# Patient Record
Sex: FEMALE | URBAN - METROPOLITAN AREA
[De-identification: names, ages, dates, MRNs, and addresses within clinical notes are randomized per-mention and may not be internally consistent; named-entity substitution may affect disease eponyms.]

---

## 2019-01-30 ENCOUNTER — APPOINTMENT (RX ONLY)
Dept: URBAN - METROPOLITAN AREA CLINIC 148 | Facility: CLINIC | Age: 74
Setting detail: DERMATOLOGY
End: 2019-01-30

## 2019-01-30 DIAGNOSIS — L57.0 ACTINIC KERATOSIS: ICD-10-CM

## 2019-01-30 DIAGNOSIS — R20.2 PARESTHESIA OF SKIN: ICD-10-CM

## 2019-01-30 PROBLEM — M12.9 ARTHROPATHY, UNSPECIFIED: Status: ACTIVE | Noted: 2019-01-30

## 2019-01-30 PROBLEM — E13.9 OTHER SPECIFIED DIABETES MELLITUS WITHOUT COMPLICATIONS: Status: ACTIVE | Noted: 2019-01-30

## 2019-01-30 PROBLEM — H91.90 UNSPECIFIED HEARING LOSS, UNSPECIFIED EAR: Status: ACTIVE | Noted: 2019-01-30

## 2019-01-30 PROBLEM — Z85.828 PERSONAL HISTORY OF OTHER MALIGNANT NEOPLASM OF SKIN: Status: ACTIVE | Noted: 2019-01-30

## 2019-01-30 PROCEDURE — 17003 DESTRUCT PREMALG LES 2-14: CPT

## 2019-01-30 PROCEDURE — ? LIQUID NITROGEN

## 2019-01-30 PROCEDURE — 99213 OFFICE O/P EST LOW 20 MIN: CPT | Mod: 25

## 2019-01-30 PROCEDURE — 17000 DESTRUCT PREMALG LESION: CPT

## 2019-01-30 PROCEDURE — ? COUNSELING

## 2019-01-30 ASSESSMENT — LOCATION DETAILED DESCRIPTION DERM
LOCATION DETAILED: LEFT CLAVICULAR SKIN
LOCATION DETAILED: UPPER STERNUM
LOCATION DETAILED: INFERIOR THORACIC SPINE
LOCATION DETAILED: LEFT CLAVICULAR NECK

## 2019-01-30 ASSESSMENT — LOCATION SIMPLE DESCRIPTION DERM
LOCATION SIMPLE: LEFT CLAVICULAR SKIN
LOCATION SIMPLE: LEFT ANTERIOR NECK
LOCATION SIMPLE: UPPER BACK
LOCATION SIMPLE: CHEST

## 2019-01-30 ASSESSMENT — LOCATION ZONE DERM
LOCATION ZONE: NECK
LOCATION ZONE: TRUNK

## 2019-01-30 NOTE — PROCEDURE: LIQUID NITROGEN
Consent: The patient's consent was obtained including but not limited to risks of crusting, scabbing, blistering, scarring, darker or lighter pigmentary change, recurrence, incomplete removal and infection.
Number Of Freeze-Thaw Cycles: 2 freeze-thaw cycles
Detail Level: Simple
Render Post-Care Instructions In Note?: no
Duration Of Freeze Thaw-Cycle (Seconds): 2
Post-Care Instructions: I reviewed with the patient in detail post-care instructions. Patient is to wear sunprotection, and avoid picking at any of the treated lesions. Pt may apply Vaseline to crusted or scabbing areas.

## 2019-04-22 ENCOUNTER — APPOINTMENT (RX ONLY)
Dept: URBAN - METROPOLITAN AREA CLINIC 151 | Facility: CLINIC | Age: 74
Setting detail: DERMATOLOGY
End: 2019-04-22

## 2019-04-22 DIAGNOSIS — L82.1 OTHER SEBORRHEIC KERATOSIS: ICD-10-CM

## 2019-04-22 DIAGNOSIS — D18.0 HEMANGIOMA: ICD-10-CM

## 2019-04-22 DIAGNOSIS — D17 BENIGN LIPOMATOUS NEOPLASM: ICD-10-CM

## 2019-04-22 DIAGNOSIS — L72.8 OTHER FOLLICULAR CYSTS OF THE SKIN AND SUBCUTANEOUS TISSUE: ICD-10-CM

## 2019-04-22 DIAGNOSIS — L57.0 ACTINIC KERATOSIS: ICD-10-CM

## 2019-04-22 DIAGNOSIS — L81.4 OTHER MELANIN HYPERPIGMENTATION: ICD-10-CM

## 2019-04-22 PROBLEM — D18.01 HEMANGIOMA OF SKIN AND SUBCUTANEOUS TISSUE: Status: ACTIVE | Noted: 2019-04-22

## 2019-04-22 PROBLEM — D17.21 BENIGN LIPOMATOUS NEOPLASM OF SKIN AND SUBCUTANEOUS TISSUE OF RIGHT ARM: Status: ACTIVE | Noted: 2019-04-22

## 2019-04-22 PROCEDURE — ? LIQUID NITROGEN

## 2019-04-22 PROCEDURE — 17003 DESTRUCT PREMALG LES 2-14: CPT

## 2019-04-22 PROCEDURE — ? COUNSELING

## 2019-04-22 PROCEDURE — 99213 OFFICE O/P EST LOW 20 MIN: CPT | Mod: 25

## 2019-04-22 PROCEDURE — 17000 DESTRUCT PREMALG LESION: CPT

## 2019-04-22 ASSESSMENT — LOCATION SIMPLE DESCRIPTION DERM
LOCATION SIMPLE: UPPER BACK
LOCATION SIMPLE: SCALP
LOCATION SIMPLE: RIGHT FOREHEAD
LOCATION SIMPLE: LEFT FOREARM
LOCATION SIMPLE: LEFT TEMPLE
LOCATION SIMPLE: RIGHT PRETIBIAL REGION
LOCATION SIMPLE: LEFT UPPER LIP
LOCATION SIMPLE: LOWER BACK
LOCATION SIMPLE: CHEST
LOCATION SIMPLE: RIGHT FOREARM
LOCATION SIMPLE: RIGHT UPPER BACK
LOCATION SIMPLE: ABDOMEN

## 2019-04-22 ASSESSMENT — LOCATION DETAILED DESCRIPTION DERM
LOCATION DETAILED: LEFT CENTRAL PARIETAL SCALP
LOCATION DETAILED: EPIGASTRIC SKIN
LOCATION DETAILED: RIGHT VENTRAL PROXIMAL FOREARM
LOCATION DETAILED: RIGHT PROXIMAL DORSAL FOREARM
LOCATION DETAILED: RIGHT INFERIOR FOREHEAD
LOCATION DETAILED: LEFT PROXIMAL DORSAL FOREARM
LOCATION DETAILED: LEFT LATERAL TEMPLE
LOCATION DETAILED: LEFT SUPERIOR VERMILION BORDER
LOCATION DETAILED: SUPERIOR LUMBAR SPINE
LOCATION DETAILED: UPPER STERNUM
LOCATION DETAILED: RIGHT PROXIMAL PRETIBIAL REGION
LOCATION DETAILED: INFERIOR THORACIC SPINE
LOCATION DETAILED: RIGHT SUPERIOR UPPER BACK

## 2019-04-22 ASSESSMENT — LOCATION ZONE DERM
LOCATION ZONE: LIP
LOCATION ZONE: FACE
LOCATION ZONE: LEG
LOCATION ZONE: ARM
LOCATION ZONE: SCALP
LOCATION ZONE: TRUNK

## 2019-08-12 PROBLEM — F33.41 RECURRENT MAJOR DEPRESSION IN PARTIAL REMISSION (HCC): Status: ACTIVE | Noted: 2018-02-01

## 2019-08-12 PROBLEM — M51.27 HERNIATED NUCLEUS PULPOSUS, L5-S1: Status: ACTIVE | Noted: 2017-12-20

## 2019-08-12 PROBLEM — Z79.01 LONG-TERM (CURRENT) USE OF ANTICOAGULANTS: Status: ACTIVE | Noted: 2017-01-16

## 2019-08-12 PROBLEM — I10 HYPERTENSION, ESSENTIAL: Status: ACTIVE | Noted: 2017-01-24

## 2019-08-12 PROBLEM — E11.8 CONTROLLED TYPE 2 DIABETES MELLITUS WITH COMPLICATION, WITHOUT LONG-TERM CURRENT USE OF INSULIN: Status: ACTIVE | Noted: 2017-03-23

## 2019-08-12 PROBLEM — E78.2 MIXED HYPERLIPIDEMIA: Status: ACTIVE | Noted: 2017-01-16

## 2019-08-12 PROBLEM — E55.9 VITAMIN D DEFICIENCY: Status: ACTIVE | Noted: 2017-03-23

## 2019-08-12 PROBLEM — M85.80 OSTEOPENIA: Status: ACTIVE | Noted: 2017-03-23

## 2019-08-12 RX ORDER — ZOLPIDEM TARTRATE 5 MG/1
5 TABLET ORAL NIGHTLY PRN
COMMUNITY
Start: 2018-06-13 | End: 2020-02-25

## 2019-08-12 RX ORDER — CITALOPRAM 10 MG/1
10 TABLET ORAL DAILY
Qty: 90 TABLET | Refills: 3
Start: 2019-08-12 | End: 2019-08-22

## 2019-08-12 RX ORDER — ACETAMINOPHEN 500 MG
TABLET ORAL
COMMUNITY
End: 2019-09-10 | Stop reason: SDUPTHER

## 2019-08-12 RX ORDER — VITAMIN E (DL,TOCOPHERYL ACET) 180 MG
CAPSULE ORAL
COMMUNITY
End: 2019-11-06

## 2019-08-12 RX ORDER — WARFARIN SODIUM 2.5 MG/1
2.5 TABLET ORAL 3 TIMES WEEKLY
COMMUNITY
Start: 2018-06-05 | End: 2020-07-09 | Stop reason: SDUPTHER

## 2019-08-22 ENCOUNTER — OFFICE VISIT (OUTPATIENT)
Dept: FAMILY MEDICINE CLINIC | Facility: CLINIC | Age: 74
End: 2019-08-22

## 2019-08-22 VITALS
TEMPERATURE: 98.1 F | DIASTOLIC BLOOD PRESSURE: 76 MMHG | SYSTOLIC BLOOD PRESSURE: 136 MMHG | HEART RATE: 56 BPM | BODY MASS INDEX: 29.26 KG/M2 | WEIGHT: 159 LBS | HEIGHT: 62 IN | OXYGEN SATURATION: 99 %

## 2019-08-22 DIAGNOSIS — Z79.01 LONG TERM CURRENT USE OF ANTICOAGULANT THERAPY: ICD-10-CM

## 2019-08-22 DIAGNOSIS — M25.512 ACUTE PAIN OF LEFT SHOULDER: Primary | ICD-10-CM

## 2019-08-22 DIAGNOSIS — M54.50 CHRONIC LOW BACK PAIN WITHOUT SCIATICA, UNSPECIFIED BACK PAIN LATERALITY: ICD-10-CM

## 2019-08-22 DIAGNOSIS — E11.8 CONTROLLED TYPE 2 DIABETES MELLITUS WITH COMPLICATION, WITHOUT LONG-TERM CURRENT USE OF INSULIN (HCC): ICD-10-CM

## 2019-08-22 DIAGNOSIS — G60.9 IDIOPATHIC PERIPHERAL NEUROPATHY: ICD-10-CM

## 2019-08-22 DIAGNOSIS — I48.0 PAROXYSMAL ATRIAL FIBRILLATION (HCC): ICD-10-CM

## 2019-08-22 DIAGNOSIS — I10 HYPERTENSION, ESSENTIAL: ICD-10-CM

## 2019-08-22 DIAGNOSIS — G89.29 CHRONIC LOW BACK PAIN WITHOUT SCIATICA, UNSPECIFIED BACK PAIN LATERALITY: ICD-10-CM

## 2019-08-22 PROCEDURE — 73030 X-RAY EXAM OF SHOULDER: CPT | Performed by: FAMILY MEDICINE

## 2019-08-22 PROCEDURE — 99204 OFFICE O/P NEW MOD 45 MIN: CPT | Performed by: FAMILY MEDICINE

## 2019-08-22 RX ORDER — PREGABALIN 50 MG/1
50 CAPSULE ORAL 3 TIMES DAILY
Qty: 90 CAPSULE | Refills: 2 | Status: SHIPPED | OUTPATIENT
Start: 2019-08-22 | End: 2019-09-12 | Stop reason: SDUPTHER

## 2019-08-22 RX ORDER — CITALOPRAM 20 MG/1
20 TABLET ORAL NIGHTLY
COMMUNITY
Start: 2019-07-08 | End: 2020-02-07

## 2019-08-22 RX ORDER — ACETAMINOPHEN 500 MG
500 TABLET ORAL EVERY 4 HOURS PRN
COMMUNITY
End: 2019-11-12 | Stop reason: HOSPADM

## 2019-08-22 NOTE — PROGRESS NOTES
Samira Workman is a 74 y.o. female.     Chief Complaint   Patient presents with   • Shoulder Pain     Patient is wanting to establish primary care. She has pain in her left shoulder    • Atrial Fibrillation     Patient is needing a referral to a new cardiologist patient said she recently had her pt/inr checked last Thursday and it was 2.0       HPI     Pt is a pleasant 74 y.o. YO female here for atrial fibrillation ( on warfarin treatment) and left shoulder pain.  PMH includes atrial fibrillation, HTN, HLD, DM2, anxiety, insomnia, chronic lower back pain.    Atrial Fibrillation: warfarin anticoagulation, MWF 2.5 mg, T, Th, Sat, Sun 5 mg - a fib occurred as a single episode - Aug 2016 - has never been on a rate or rhythm control. Does occasionally have a burning pain that occurs behind her shoulder blades or indigestion type pain and she wonders if this is an episode of a-fib that causes these symptoms.     HTN: chronic, well controlled, benazepril 10 mg once daily    DM2: chronic, well controlled - metformin 500 mg once daily, last A1C was done in July or August, patient has result at home    Anxiety: chronic, stable, doing very well on citalopram 20 mg    Insomnia: secondary to neuropathy/ pain rather than true insomnia, takes zolpidem 5 mg at bedtime which helps her go to sleep.     Lower Back Pain: takes tramadol twice daily with two tylenol and helps significantly, tramadol helps some with neuropathy as well. Does not like taking it and is careful to only use it when her pain is more severe.     Peripheral Neuropathy: bilateral feet and toes, constant, sometimes burning/pins and needles, primarily feels incredible pressure in foot - toes constantly moving. Diagnosed by neurology who said it was a result of lower back disease, had an EMG. Has tried gabapentin but had side effect of unbearable dry mouth. Thinks she may have tried duloxetine or lyrica in the past but unsure.      Left Shoulder Pain: Started about  5-6 weeks ago - after she fell getting out of bed, has continued to have pain in the shoulder since then. Pain is constant, left shoulder tender to palpation, has pain with internal and external rotation.     The following portions of the patient's history were reviewed and updated as appropriate: allergies, current medications, past family history, past medical history, past social history, past surgical history and problem list.    Health Maintenance   DEXA scan 10/18  Colonoscopy 4 years ago normal, GI told her it was last one indicated  Mammogram 10/18    Review of Systems   Constitutional: Negative for fatigue and unexpected weight change.   HENT: Positive for postnasal drip. Negative for congestion and sinus pain.    Eyes: Negative for pain and redness.   Respiratory: Negative for cough and wheezing.    Cardiovascular: Negative for chest pain and palpitations.   Gastrointestinal: Negative for nausea and vomiting.   Endocrine: Negative for cold intolerance and heat intolerance.   Genitourinary: Negative for dysuria and frequency.   Musculoskeletal: Positive for arthralgias and back pain. Negative for neck pain.        Patient is having left shoulder pain    Skin: Negative for rash and wound.   Allergic/Immunologic: Negative for environmental allergies and food allergies.   Neurological: Negative for numbness and headaches.   Hematological: Negative for adenopathy. Does not bruise/bleed easily.   Psychiatric/Behavioral: Negative for behavioral problems. The patient is not hyperactive.        Objective  Vitals:    08/22/19 0946   BP: 136/76   Pulse: 56   Temp: 98.1 °F (36.7 °C)   SpO2: 99%        Physical Exam   Constitutional: She is oriented to person, place, and time. She appears well-developed and well-nourished. No distress.   HENT:   Head: Normocephalic and atraumatic.   Nose: Nose normal.   Eyes: Conjunctivae are normal. Right eye exhibits no discharge. Left eye exhibits no discharge. No scleral icterus.    Pulmonary/Chest: Effort normal. No respiratory distress.   Musculoskeletal:   Tenderness to palpation over shoulder diffusely, negative empty can test, pain with internal and external rotation but good range of motion   Neurological: She is alert and oriented to person, place, and time.   Skin: Skin is warm and dry.   Psychiatric: She has a normal mood and affect. Her behavior is normal. Judgment and thought content normal.         Current Outpatient Medications:   •  acetaminophen (TYLENOL) 500 MG tablet, Take  by mouth., Disp: , Rfl:   •  acetaminophen (TYLENOL) 500 MG tablet, Take  by mouth., Disp: , Rfl:   •  B COMPLEX VITAMINS PO, Take 1 tablet by mouth Daily., Disp: , Rfl:   •  benazepril (LOTENSIN) 10 MG tablet, Take 10 mg by mouth Daily., Disp: , Rfl:   •  Calcium Carb-Cholecalciferol (CALCIUM 1000 + D PO), calcium  1000 daily, Disp: , Rfl:   •  Cholecalciferol (VITAMIN D3) 1000 UNIT/SPRAY liquid, Vitamin D3  1 daily, Disp: , Rfl:   •  citalopram (CeleXA) 20 MG tablet, , Disp: , Rfl:   •  cyanocobalamin 100 MCG tablet, B12  1 daily, Disp: , Rfl:   •  metFORMIN (GLUCOPHAGE) 500 MG tablet, Take 500 mg by mouth 4 (Four) Times a Day., Disp: , Rfl:   •  Multiple Vitamins-Minerals (HAIR SKIN NAILS) capsule, Take  by mouth., Disp: , Rfl:   •  pravastatin (PRAVACHOL) 40 MG tablet, Take 40 mg by mouth Daily., Disp: , Rfl:   •  traMADol (ULTRAM) 50 MG tablet, Take 50 mg by mouth Every 6 (Six) Hours As Needed for Moderate Pain ., Disp: , Rfl:   •  TURMERIC PO, Take 1 tablet by mouth Daily., Disp: , Rfl:   •  warfarin (COUMADIN) 2.5 MG tablet, Take 2.5 mg by mouth 3 (Three) Times a Week. 3 times a week, Disp: , Rfl:   •  warfarin (COUMADIN) 5 MG tablet, Take 5 mg by mouth 4 (Four) Times a Week. Take 4 days a week, Disp: , Rfl:   •  zolpidem (AMBIEN) 5 MG tablet, Take 5 mg by mouth., Disp: , Rfl:   •  pregabalin (LYRICA) 50 MG capsule, Take 1 capsule by mouth 3 (Three) Times a Day., Disp: 90 capsule, Rfl:  2    Procedures    Lab Results (most recent)     None              Samira was seen today for shoulder pain and atrial fibrillation.    Diagnoses and all orders for this visit:    Acute pain of left shoulder  -     XR Shoulder 2+ View Left    Idiopathic peripheral neuropathy  -     pregabalin (LYRICA) 50 MG capsule; Take 1 capsule by mouth 3 (Three) Times a Day.    Hypertension, essential    Paroxysmal atrial fibrillation (CMS/HCC)  -     Ambulatory Referral to Cardiology    Controlled type 2 diabetes mellitus with complication, without long-term current use of insulin (CMS/HCC)    Chronic low back pain without sciatica, unspecified back pain laterality    Long term current use of anticoagulant therapy    Other orders  -     Discontinue: citalopram (CELEXA) 10 MG tablet; Take 1 tablet by mouth Daily.    Atrial Fibrillation: continue current warfarin therapy, return in ~ 3 weeks for INR recheck, referral to cardiology regarding risk/benefit of continuing anticoagulation with single episode of a-fib    HTN: continue benazepril at 10 mg    DM2: chronic, well controlled - continue daily metformin, patient will bring result of recent A1C to next appointment in a couple of weeks    Anxiety: doing very well on citalopram 20 mg - continue    Insomnia: zolpidem 5 mg. Hopeful that if we can find something that helps with her symptoms of neuropathy may be able to eventually wean off of and discontinue this.     Lower Back Pain: tramadol 50 mg BID prn, The patient read and signed the [facility] Controlled Substance Contract.  I will continue to see patient for regular follow up appointments.  They are well controlled on their medication.  YNES has been reviewed by me and will be updated every 3 months. The patient is aware of the potential for addiction and dependence.    Peripheral Neuropathy: chronic, uncontrolled. Failed treatment with gabapentin due to negative side effects. Will try pregabalin starting at 50 mg TID,  follow up in a couple weeks.     Left Shoulder Pain: Started about 5-6 weeks ago - after she fell getting out of bed, has continued to have pain in the shoulder since then. Pain is constant, left shoulder tender to palpation, has pain with internal and external rotation. X-ray performed in office, reviewed by me, concerning for fracture of scapula - forwarded to radiology who reviewed film with read as degenerative joint disease. With her continued pain may have a flair of arthritis or transitioning to adhesive capsulitis. Will recommend physical therapy for patient and application of heating pad, will follow up in a couple weeks, if still painful may perform joint injection.       Return in about 3 weeks (around 9/12/2019) for Recheck shoulder and INR check.      Anne Leiva MD

## 2019-09-10 ENCOUNTER — OFFICE VISIT (OUTPATIENT)
Dept: CARDIOLOGY | Facility: CLINIC | Age: 74
End: 2019-09-10

## 2019-09-10 VITALS
SYSTOLIC BLOOD PRESSURE: 136 MMHG | HEART RATE: 59 BPM | BODY MASS INDEX: 29.83 KG/M2 | WEIGHT: 158 LBS | HEIGHT: 61 IN | DIASTOLIC BLOOD PRESSURE: 80 MMHG

## 2019-09-10 DIAGNOSIS — R91.1 PULMONARY NODULE: ICD-10-CM

## 2019-09-10 DIAGNOSIS — I48.0 PAROXYSMAL ATRIAL FIBRILLATION (HCC): ICD-10-CM

## 2019-09-10 DIAGNOSIS — M89.8X1 SHOULDER BLADE PAIN: Primary | ICD-10-CM

## 2019-09-10 PROCEDURE — 99204 OFFICE O/P NEW MOD 45 MIN: CPT | Performed by: INTERNAL MEDICINE

## 2019-09-10 PROCEDURE — 93000 ELECTROCARDIOGRAM COMPLETE: CPT | Performed by: INTERNAL MEDICINE

## 2019-09-10 NOTE — PROGRESS NOTES
Subjective:     Encounter Date:09/10/2019      Patient ID: Samira Workman is a 74 y.o. female.    Chief Complaint:  This is a 74-year-old woman with a history of single episode of atrial fibrillation.  She has recently moved from [place] is here to establish care.  In 2016 she was living in [place].  She was under a lot of stress as her elderly mother was living with her at that time.  She had severe chest pain and a syncopal episode.  She presented to the hospital there and underwent thorough cardiac evaluation.  She was found to be in atrial fibrillation.  She apparently converted back to normal rhythm and did not require at least electrical cardioversion.  She underwent cardiac catheterization via the right radial artery which showed normal coronary arteries and LV filling pressures.  She had a CTA of the chest which showed a 5 mm Noncalcified nodule in the right lower lobe which should have been followed up with serial CT scans, but was not.  She had a AAA screen which was normal.  She had carotid Dopplers showing less than 50% bilateral stenosis.  She had an echocardiogram which was technically limited but showed and normal LV systolic function with mitral annular calcification.  The patient was treated with full dose anticoagulation with warfarin.      She has not had another episode of atrial fibrillation.  She has no chest pain or dyspnea.  No further syncope or palpitations.  She recently moved from [place] last month to be closer to the rest of her family.  She does not participate in regular exercise currently.She says her INR is usually around 2-2.5.  She is on bothered by frequent INR checks.    She feels quite well outside of arthritis in her left shoulder.  She also has persistent shoulder blade pain on the right side.  She also complains of lower extremity peripheral neuropathy.    She has never smoked.  She drinks alcohol socially.  She is retired and  2 children.  Her father had heart  disease.        The following portions of the patient's history were reviewed and updated as appropriate: allergies, current medications, past family history, past medical history, past social history, past surgical history and problem list.         REVIEW OF SYSTEMS:   All systems reviewed.  Pertinent positives identified in HPI.  All other systems are negative.      Past Medical History:   Diagnosis Date   • Anxiety    • Arthritis    • Depression    • Diabetes mellitus (CMS/HCC)    • Hyperlipidemia    • Hypertension    • Neuropathy    • Paroxysmal atrial fibrillation (CMS/HCC) 8/6/2016       Family History   Problem Relation Age of Onset   • Thyroid disease Mother    • Anxiety disorder Mother    • Depression Mother    • Bipolar disorder Mother    • Heart failure Father    • Hypertension Father    • Kidney nephrosis Brother        Social History     Socioeconomic History   • Marital status:      Spouse name: Not on file   • Number of children: 2   • Years of education: Not on file   • Highest education level: Not on file   Tobacco Use   • Smoking status: Never Smoker   • Smokeless tobacco: Never Used   Substance and Sexual Activity   • Alcohol use: Yes     Frequency: Monthly or less     Comment: occasionally socially   • Drug use: Defer   • Sexual activity: Defer       Allergies   Allergen Reactions   • Ciprofibrate Nausea And Vomiting   • Ciprofloxacin Nausea And Vomiting   • Gabapentin Other (See Comments)     Severe dry mouth.   • Sulfa Antibiotics Itching       Past Surgical History:   Procedure Laterality Date   • BACK SURGERY     • BLADDER SUSPENSION     • CHOLECYSTECTOMY     • HYSTERECTOMY  1970   • KNEE SURGERY Right    • SHOULDER SURGERY Right          ECG 12 Lead  Date/Time: 9/10/2019 11:45 AM  Performed by: Linda Shin MD  Authorized by: Linda Shin MD   Comparison: compared with previous ECG from 5/8/2019  Similar to previous ECG  Rhythm: sinus rhythm  Rate: normal  Conduction: conduction  normal  ST Segments: ST segments normal  T Waves: T waves normal  QRS axis: normal  Other: no other findings    Clinical impression: normal ECG               Objective:         GEN: VSS, no distress,   Eyes: normal sclera, normal lids and lashes  HENT: moist mucus membranes,   Respiratory: CTAB, no rales or wheezes  CV: RRR, no murmurs, , +2 DP and 2+ carotid pulses b/l  GI: NABS, soft,  Nontender, nondistended  MSK: no edema, no scoliosis or kyphosis  Skin: no rash, warm, dry  Heme/Lymph: no bruising or bleeding  Psych: organized thought, normal behavior and affect  Neuro: Cranial nerves grossly intact, Alert and Oriented x 3.       Outpatient Encounter Medications as of 9/10/2019   Medication Sig Dispense Refill   • acetaminophen (TYLENOL) 500 MG tablet Take  by mouth.     • B COMPLEX VITAMINS PO Take 1 tablet by mouth Daily.     • benazepril (LOTENSIN) 10 MG tablet Take 10 mg by mouth Daily.     • Calcium Carb-Cholecalciferol (CALCIUM 1000 + D PO) calcium   1000 daily     • Cholecalciferol (VITAMIN D3) 1000 UNIT/SPRAY liquid Vitamin D3   1 daily     • citalopram (CeleXA) 20 MG tablet      • metFORMIN (GLUCOPHAGE) 500 MG tablet Take 500 mg by mouth 4 (Four) Times a Day.     • Multiple Vitamins-Minerals (HAIR SKIN NAILS) capsule Take  by mouth.     • pravastatin (PRAVACHOL) 40 MG tablet Take 40 mg by mouth Daily.     • pregabalin (LYRICA) 50 MG capsule Take 1 capsule by mouth 3 (Three) Times a Day. 90 capsule 2   • traMADol (ULTRAM) 50 MG tablet Take 50 mg by mouth Every 6 (Six) Hours As Needed for Moderate Pain .     • triamcinolone (KENALOG) 0.1 % cream Apply  topically to the appropriate area as directed 2 (Two) Times a Day. 45 g 0   • TURMERIC PO Take 1 tablet by mouth Daily.     • warfarin (COUMADIN) 2.5 MG tablet Take 2.5 mg by mouth 3 (Three) Times a Week. 3 times a week     • warfarin (COUMADIN) 5 MG tablet Take 5 mg by mouth 4 (Four) Times a Week. Take 4 days a week     • zolpidem (AMBIEN) 5 MG tablet Take  5 mg by mouth.     • [DISCONTINUED] acetaminophen (TYLENOL) 500 MG tablet Take  by mouth.     • [DISCONTINUED] cyanocobalamin 100 MCG tablet B12   1 daily       No facility-administered encounter medications on file as of 9/10/2019.              Assessment:          Diagnosis Plan   1. Shoulder blade pain  Ambulatory Referral to Orthopedic Surgery   2. Paroxysmal atrial fibrillation (CMS/HCC)     3. Pulmonary nodule            Plan:       Atrial Fibrillation and Atrial Flutter  Assessment  • The patient has paroxysmal atrial fibrillation  • This is non-valvular in etiology  • The patient's CHADS2-VASc score is 4  • A [id number] score of 2 or more is considered a high risk for a thromboembolic event  • Warfarin prescribed    This is a 74 year old woman with one episode of AF in the past in 2016. It seems that she's had no further AF since then. She has been maintained on Warfarin since then. I asked if she would like to switch to a NOAC for ease of dosing and overall safety, but she prefers to stay with Warfarin.   For her pulmonary nodule, I have ordered a repeat CT chest for follow up.   For her shoulder pain after a fall, I referred her to orthopedics.     I will see her back in 6 months.     Dr. Leiva, thank you very much for referring this patient to me. Please call with questions or concerns.        Linda Shin MD  09/10/19  [facility]

## 2019-09-12 ENCOUNTER — OFFICE VISIT (OUTPATIENT)
Dept: FAMILY MEDICINE CLINIC | Facility: CLINIC | Age: 74
End: 2019-09-12

## 2019-09-12 VITALS
DIASTOLIC BLOOD PRESSURE: 64 MMHG | BODY MASS INDEX: 30.06 KG/M2 | SYSTOLIC BLOOD PRESSURE: 114 MMHG | HEART RATE: 54 BPM | WEIGHT: 159.2 LBS | HEIGHT: 61 IN | OXYGEN SATURATION: 99 % | TEMPERATURE: 98.1 F

## 2019-09-12 DIAGNOSIS — Z79.01 LONG TERM CURRENT USE OF ANTICOAGULANT THERAPY: Primary | ICD-10-CM

## 2019-09-12 DIAGNOSIS — M25.512 ACUTE PAIN OF LEFT SHOULDER: ICD-10-CM

## 2019-09-12 DIAGNOSIS — H53.8 BLURRED VISION: ICD-10-CM

## 2019-09-12 DIAGNOSIS — G60.9 IDIOPATHIC PERIPHERAL NEUROPATHY: ICD-10-CM

## 2019-09-12 LAB
INR PPP: 1.9 (ref 0.9–1.1)
PROTHROMBIN TIME: 22.7 SECONDS

## 2019-09-12 PROCEDURE — 85610 PROTHROMBIN TIME: CPT | Performed by: FAMILY MEDICINE

## 2019-09-12 PROCEDURE — 99214 OFFICE O/P EST MOD 30 MIN: CPT | Performed by: FAMILY MEDICINE

## 2019-09-12 PROCEDURE — 36416 COLLJ CAPILLARY BLOOD SPEC: CPT | Performed by: FAMILY MEDICINE

## 2019-09-12 RX ORDER — PREGABALIN 150 MG/1
150 CAPSULE ORAL 2 TIMES DAILY
Qty: 60 CAPSULE | Refills: 2 | Status: SHIPPED | OUTPATIENT
Start: 2019-09-12 | End: 2019-10-10

## 2019-09-12 NOTE — PROGRESS NOTES
Samira Workman is a 74 y.o. female.     Chief Complaint   Patient presents with   • Shoulder Pain     Patient is needing to f/u on her lyrica medication. She is also wanting a recommendation for an eye doctor        HPI     Pt is a pleasant 74 y.o. YO female here for INR check, peripheral neuropathy, vision loss, and left shoulder pain.  PMH includes diabetes/pre-diabetes, atrial fibrillation, HTN, and osteopenia.    Anticoagulation - on Warfarin - INR check - INR today is 1.9, just below goal of 2-3. She did have more salad this last week. Will not make medication dose adjustments at this time as she has been on stable dose for long period but will recheck in a couple of weeks. She is on anticoagulation for a-fib.    Peripheral Neuropathy: chronic,  uncontrolled, started lyrica ( pregabalin) at last office visit three weeks ago. She feels that she has had some improvement in her symptoms especially during the earlier part of the day but the longer she is on her feet the more severe symptoms become. She has been taking it every 8 hours and has even set an alarm to wake up at 2:00 AM to take it, when she has tried scooting the TID dosing closer she suffers from dry mouth. She is interested in trying an increased dose but only taking the medication twice a day. Other than dry mouth has had no side effects.    Vision Loss: progressive, has a history of cataracts with removal, has not had an eye exam in some time, requests a referral to an ophthalmologist.     Left Shoulder pain: Has been having some improvement but pain re-occurs when she has to lift something heavy or carry something. She does not have pain today. She has a history of right shoulder problems requiring surgery - unsure what exactly the problem was ( possibly rotator cuff tear?) but says her current symptoms on the left feel similar.     The following portions of the patient's history were reviewed and updated as appropriate: allergies, current  medications, past family history, past medical history, past social history, past surgical history and problem list.    Review of Systems   Eyes: Positive for visual disturbance.        Eye dryness   Gastrointestinal: Negative for abdominal pain and constipation.   Musculoskeletal:        Shoulder pain   Neurological:        Neuropathy of the legs       Objective  Vitals:    09/12/19 1049   BP: 114/64   Pulse: 54   Temp: 98.1 °F (36.7 °C)   SpO2: 99%        Physical Exam   Constitutional: She is oriented to person, place, and time. She appears well-developed and well-nourished. No distress.   HENT:   Head: Normocephalic and atraumatic.   Nose: Nose normal.   Eyes: Conjunctivae are normal. Right eye exhibits no discharge. Left eye exhibits no discharge. No scleral icterus.   Neck: No tracheal deviation present.   Pulmonary/Chest: Effort normal. No respiratory distress.   Musculoskeletal:        Left shoulder: She exhibits normal range of motion, no tenderness, no bony tenderness, no deformity and no pain.   Neurological: She is alert and oriented to person, place, and time.   Skin: No erythema. No pallor.   Psychiatric: She has a normal mood and affect. Her behavior is normal. Judgment and thought content normal.         Current Outpatient Medications:   •  acetaminophen (TYLENOL) 500 MG tablet, Take  by mouth., Disp: , Rfl:   •  benazepril (LOTENSIN) 10 MG tablet, Take 10 mg by mouth Daily., Disp: , Rfl:   •  Calcium Carb-Cholecalciferol (CALCIUM 1000 + D PO), calcium  1000 daily, Disp: , Rfl:   •  Cholecalciferol (VITAMIN D3) 1000 UNIT/SPRAY liquid, Vitamin D3  1 daily, Disp: , Rfl:   •  citalopram (CeleXA) 20 MG tablet, , Disp: , Rfl:   •  metFORMIN (GLUCOPHAGE) 500 MG tablet, Take 500 mg by mouth 4 (Four) Times a Day., Disp: , Rfl:   •  Multiple Vitamin (MULTI VITAMIN DAILY PO), Take  by mouth., Disp: , Rfl:   •  Multiple Vitamins-Minerals (HAIR SKIN NAILS) capsule, Take  by mouth., Disp: , Rfl:   •  pravastatin  (PRAVACHOL) 40 MG tablet, Take 40 mg by mouth Daily., Disp: , Rfl:   •  pregabalin (LYRICA) 150 MG capsule, Take 1 capsule by mouth 2 (Two) Times a Day., Disp: 60 capsule, Rfl: 2  •  traMADol (ULTRAM) 50 MG tablet, Take 50 mg by mouth Every 6 (Six) Hours As Needed for Moderate Pain ., Disp: , Rfl:   •  TURMERIC PO, Take 1 tablet by mouth Daily., Disp: , Rfl:   •  warfarin (COUMADIN) 2.5 MG tablet, Take 2.5 mg by mouth 3 (Three) Times a Week. 3 times a week, Disp: , Rfl:   •  warfarin (COUMADIN) 5 MG tablet, Take 5 mg by mouth 4 (Four) Times a Week. Take 4 days a week, Disp: , Rfl:   •  zolpidem (AMBIEN) 5 MG tablet, Take 5 mg by mouth., Disp: , Rfl:     Procedures    Office Visit on 09/12/2019   Component Date Value Ref Range Status   • Protime 09/12/2019 22.7  seconds In process   • INR 09/12/2019 1.9* 0.9 - 1.1 In process             Samira was seen today for shoulder pain.    Diagnoses and all orders for this visit:    Long term current use of anticoagulant therapy  -     POC Protime / INR  - no changes to current warfarin dosing, discussed how salads/leafy greens can affect INR, repeat check in a couple weeks    Idiopathic peripheral neuropathy  Some improvement with lyrica, will try increased dose and change from TID to BID, follow up in a month  -     pregabalin (LYRICA) 150 MG capsule; Take 1 capsule by mouth 2 (Two) Times a Day.    Blurred vision  -     Ambulatory Referral to Ophthalmology     Acute pain of left shoulder  My main concern is for injury or partial tear of one of the rotator cuff muscles, pain is over superior shoulder near insertion of several of these, more pain with abduction and external rotation, pain has persisted almost two months now, will further evaluate with MRI.   -     MRI Shoulder Left Without Contrast; Future      Return in about 1 month (around 10/12/2019) for Recheck diabetes, A1C. and INR      Anne Leiva MD

## 2019-09-17 DIAGNOSIS — M25.512 ACUTE PAIN OF LEFT SHOULDER: ICD-10-CM

## 2019-09-20 ENCOUNTER — TELEPHONE (OUTPATIENT)
Dept: CARDIOLOGY | Facility: CLINIC | Age: 74
End: 2019-09-20

## 2019-09-30 ENCOUNTER — CONSULT (OUTPATIENT)
Dept: ORTHOPEDIC SURGERY | Facility: CLINIC | Age: 74
End: 2019-09-30

## 2019-09-30 VITALS — WEIGHT: 148 LBS | HEIGHT: 61 IN | BODY MASS INDEX: 27.94 KG/M2 | TEMPERATURE: 97.8 F

## 2019-09-30 DIAGNOSIS — M25.512 LEFT SHOULDER PAIN, UNSPECIFIED CHRONICITY: Primary | ICD-10-CM

## 2019-09-30 DIAGNOSIS — M75.100 NONTRAUMATIC TEAR OF ROTATOR CUFF, UNSPECIFIED LATERALITY, UNSPECIFIED TEAR EXTENT: ICD-10-CM

## 2019-09-30 PROCEDURE — 99204 OFFICE O/P NEW MOD 45 MIN: CPT | Performed by: ORTHOPAEDIC SURGERY

## 2019-10-01 RX ORDER — CEFAZOLIN SODIUM 2 G/100ML
2 INJECTION, SOLUTION INTRAVENOUS ONCE
Status: CANCELLED | OUTPATIENT
Start: 2019-11-12 | End: 2019-10-01

## 2019-10-01 NOTE — PROGRESS NOTES
Patient: Samira Workman    YOB: 1945    Medical Record Number: [id number]    Chief Complaints:   Left shoulder pain    History of Present Illness:     74 y.o. female patient who presents with left shoulder pain and weakness.  Her symptoms started after a fall that occurred on July 14.  She was asymptomatic prior to this fall.  Pain is described as moderate to severe, constant and aching.  She gets intermittent stabbing pains with range of motion.  Anti-inflammatories have helped modestly.  She reports weakness when trying to reach or lift.  Denies any other associated complaints or issues.    Allergies:   Allergies   Allergen Reactions   • Ciprofibrate Nausea And Vomiting   • Ciprofloxacin Nausea And Vomiting   • Gabapentin Other (See Comments)     Severe dry mouth.   • Sulfa Antibiotics Itching       Home Medications:    Current Outpatient Medications:   •  acetaminophen (TYLENOL) 500 MG tablet, Take  by mouth., Disp: , Rfl:   •  benazepril (LOTENSIN) 10 MG tablet, Take 10 mg by mouth Daily., Disp: , Rfl:   •  Calcium Carb-Cholecalciferol (CALCIUM 1000 + D PO), calcium  1000 daily, Disp: , Rfl:   •  Cholecalciferol (VITAMIN D3) 1000 UNIT/SPRAY liquid, Vitamin D3  1 daily, Disp: , Rfl:   •  citalopram (CeleXA) 20 MG tablet, , Disp: , Rfl:   •  metFORMIN (GLUCOPHAGE) 500 MG tablet, Take 500 mg by mouth 4 (Four) Times a Day., Disp: , Rfl:   •  Multiple Vitamin (MULTI VITAMIN DAILY PO), Take  by mouth., Disp: , Rfl:   •  Multiple Vitamins-Minerals (HAIR SKIN NAILS) capsule, Take  by mouth., Disp: , Rfl:   •  pravastatin (PRAVACHOL) 40 MG tablet, Take 40 mg by mouth Daily., Disp: , Rfl:   •  pregabalin (LYRICA) 150 MG capsule, Take 1 capsule by mouth 2 (Two) Times a Day., Disp: 60 capsule, Rfl: 2  •  traMADol (ULTRAM) 50 MG tablet, Take 50 mg by mouth Every 6 (Six) Hours As Needed for Moderate Pain ., Disp: , Rfl:   •  TURMERIC PO, Take 1 tablet by mouth Daily., Disp: , Rfl:   •  warfarin (COUMADIN) 2.5 MG  tablet, Take 2.5 mg by mouth 3 (Three) Times a Week. 3 times a week, Disp: , Rfl:   •  warfarin (COUMADIN) 5 MG tablet, Take 5 mg by mouth 4 (Four) Times a Week. Take 4 days a week, Disp: , Rfl:   •  zolpidem (AMBIEN) 5 MG tablet, Take 5 mg by mouth., Disp: , Rfl:     Past Medical History:   Diagnosis Date   • Anxiety    • Arthritis    • Depression    • Diabetes mellitus (CMS/HCC)    • Hyperlipidemia    • Hypertension    • Neuropathy    • Paroxysmal atrial fibrillation (CMS/HCC) 8/6/2016       Past Surgical History:   Procedure Laterality Date   • BACK SURGERY     • BLADDER SUSPENSION     • CHOLECYSTECTOMY     • HYSTERECTOMY  1970   • KNEE SURGERY Right    • SHOULDER SURGERY Right        Social History     Occupational History   • Not on file   Tobacco Use   • Smoking status: Never Smoker   • Smokeless tobacco: Never Used   Substance and Sexual Activity   • Alcohol use: Yes     Frequency: Monthly or less     Comment: occasionally socially   • Drug use: Defer   • Sexual activity: Defer      Social History     Social History Narrative   • Not on file       Family History   Problem Relation Age of Onset   • Thyroid disease Mother    • Anxiety disorder Mother    • Depression Mother    • Bipolar disorder Mother    • Heart failure Father    • Hypertension Father    • Kidney nephrosis Brother        Review of Systems:      Constitutional: Denies fever, shaking or chills   Eyes: Denies change in visual acuity   HEENT: Denies nasal congestion or sore throat   Respiratory: Denies cough or shortness of breath   Cardiovascular: Denies chest pain or edema  Endocrine: Denies tremors, palpitations, intolerance of heat or cold, polyuria, polydipsia.  GI: Denies abdominal pain, nausea, vomiting, bloody stools or diarrhea  : Denies frequency, urgency, incontinence, retention, or nocturia.  Musculoskeletal: Denies numbness, tingling or loss of motor function except as above  Integument: Denies rash, lesion or ulceration  "  Neurologic: Denies headache or focal weakness, deficits  Heme: Denies spontaneous or excessive bleeding, epistaxis, hematuria, melena, fatigue, enlarged or tender lymph nodes.      All other pertinent positives and negatives as noted above in HPI.    Physical Exam: 74 y.o. female    Vitals:    09/30/19 1430   Temp: 97.8 °F (36.6 °C)   Weight: 67.1 kg (148 lb)   Height: 154.9 cm (60.98\")     General:  Patient is awake and alert.  Appears in no acute distress or discomfort.    Psych:  Affect and demeanor are appropriate.    Eyes:  Conjunctiva and sclera appear grossly normal.  Eyes track well and EOM seem to be intact.    Ears:  No gross abnormalities.  Hearing adequate for the exam.    Cardiovascular:  Regular rate and rhythm.    Lungs:  Good chest expansion.  Breathing unlabored.    Lymph:  No palpable adenopathy about neck or axilla.    Neck:  Supple.  Normal ROM.  Negative Spurling's for shoulder or arm pain.    Left upper extremity:  Skin is benign.  No gross abnormalities on inspection including any atrophy, swellings, or masses.  No palpable masses or adenopathy.  Focal tenderness over the acromioclavicular joint.  Positive active compression maneuver..  Full shoulder motion.  No evident instability or apprehension.  Mildly positive Neer, Tapia.  Weakness with forward elevation in the scapular plane.  Good strength with internal and external rotation.  Good strength in the deltoid, biceps, triceps, and .  Intact sensation throughout the arm.  Brisk cap refill.  Palpable radial pulse.  Good skin turgor.         Radiology:   Outside x-ray and MRI reports are available for review.  We do not have the images themselves at this time but are working on acquiring those.  X-rays show moderate acromioclavicular arthritis.  MRI shows a full-thickness anterior supraspinatus tendon tear measuring less than a centimeter.  There is moderate associated tendinopathy.  There is extensive acromioclavicular joint " arthropathy with periarticular inflammation and edema.  There is also mention of a suspected small degenerative labral tear.    Assessment/Plan:   Left rotator cuff tendon tear and acromioclavicular separation with acromioclavicular arthritis    We discussed the natural history of full-thickness rotator cuff tears.  I think she probably  her shoulder in the fall as well.  We discussed conservative treatment options.  I think is reasonable to try an injection and therapy.  She tells me that she was once given a cortisone shot for poison ivy.  It caused her a severe headache and facial erythema.  She also started to develop some swelling.  She is fearful that she has an allergy to cortisone and is adamantly opposed to any further injections.  We talked about therapy but she understands that this is not going to cure the tear.  She does not want to go that route.  She would like to get this fixed.  I explained that she will need medical clearance.  She tells me that she has already obtained this and permission to come off of the Coumadin.    We had a thorough discussion regarding the risks, benefits and alternatives to an arthroscopic rotator cuff repair with distal clavicle excision and non-surgical management versus surgery.  I explained that surgical risks include infection, hematoma, anchor related complications including failure of fixation, loosening, cutout of the anchors, chondrolysis, rotator cuff re-tear necessitating revision surgery, persistent pain and/or loss of motion, iatrogenic nerve and/or blood vessel injury resulting in permanent weakness, numbness or dysfunction, RSD, DVT, PE, positioning related neuropraxia, and anesthesia related complications resulting in death.  We further discussed the possible need to address the biceps with a possible tenotomy versus tenodesis.  We discussed the fact that if the biceps demonstrates significant pathology in the groove that I would plan to perform a  tenotomy which could result in david deformity or persistent pain, weakness or cramping.  We also discussed possible risks with a tenodesis as well including screw related complications including cutout, chondrolysis, failure of fixation with re-tear of the biceps, fracture and possible iatrogenic nerve injury.  The patient voiced understanding of the risks, benefits, and alternative forms of treatment that were discussed and the patient consents to proceed with an arthroscopic repair and distal clavicle excision.      Cresencio Christina MD    09/30/2019    CC to Anne So MD

## 2019-10-04 RX ORDER — BENAZEPRIL HYDROCHLORIDE 10 MG/1
TABLET ORAL
Qty: 90 TABLET | Refills: 1 | Status: SHIPPED | OUTPATIENT
Start: 2019-10-04 | End: 2019-11-06

## 2019-10-10 ENCOUNTER — OFFICE VISIT (OUTPATIENT)
Dept: FAMILY MEDICINE CLINIC | Facility: CLINIC | Age: 74
End: 2019-10-10

## 2019-10-10 ENCOUNTER — TELEPHONE (OUTPATIENT)
Dept: CARDIOLOGY | Facility: CLINIC | Age: 74
End: 2019-10-10

## 2019-10-10 VITALS
BODY MASS INDEX: 29.98 KG/M2 | TEMPERATURE: 98.3 F | HEART RATE: 58 BPM | OXYGEN SATURATION: 99 % | WEIGHT: 158.8 LBS | SYSTOLIC BLOOD PRESSURE: 146 MMHG | HEIGHT: 61 IN | DIASTOLIC BLOOD PRESSURE: 80 MMHG

## 2019-10-10 DIAGNOSIS — G62.9 PERIPHERAL POLYNEUROPATHY: ICD-10-CM

## 2019-10-10 DIAGNOSIS — Z79.01 LONG TERM CURRENT USE OF ANTICOAGULANT THERAPY: Primary | ICD-10-CM

## 2019-10-10 DIAGNOSIS — E11.8 CONTROLLED TYPE 2 DIABETES MELLITUS WITH COMPLICATION, WITHOUT LONG-TERM CURRENT USE OF INSULIN (HCC): ICD-10-CM

## 2019-10-10 DIAGNOSIS — I10 HYPERTENSION, ESSENTIAL: ICD-10-CM

## 2019-10-10 PROBLEM — F41.9 ANXIETY: Status: ACTIVE | Noted: 2019-06-12

## 2019-10-10 PROBLEM — G47.00 INSOMNIA: Status: ACTIVE | Noted: 2019-06-12

## 2019-10-10 LAB
HBA1C MFR BLD: 6.3 %
INR PPP: 2.4 (ref 0.9–1.1)
PROTHROMBIN TIME: 28.7 SECONDS

## 2019-10-10 PROCEDURE — 99214 OFFICE O/P EST MOD 30 MIN: CPT | Performed by: FAMILY MEDICINE

## 2019-10-10 PROCEDURE — 36416 COLLJ CAPILLARY BLOOD SPEC: CPT | Performed by: FAMILY MEDICINE

## 2019-10-10 PROCEDURE — 85610 PROTHROMBIN TIME: CPT | Performed by: FAMILY MEDICINE

## 2019-10-10 PROCEDURE — 83036 HEMOGLOBIN GLYCOSYLATED A1C: CPT | Performed by: FAMILY MEDICINE

## 2019-10-10 NOTE — TELEPHONE ENCOUNTER
reviewed pt's chest Ct  Per  pt's CT showed stable lung nodule and no further workup needed    Pt was notified and voiced understanding, she had no further questions at this time

## 2019-10-10 NOTE — PROGRESS NOTES
Samira Workman is a 74 y.o. female.     Chief Complaint   Patient presents with   • Coagulation Disorder     Patient is needing her pt/inr checked    • Foot Pain     Patient is needing to f/u on the neuropathy in both her feet    • Diabetes     Needs A1C check       HPI     Pt is a pleasant 74 y.o. YO female here for anticoagulation, neuropathy, and diabetes.     Anticoagulation - taking warfarin daily for atrial fibrillation, alternates days taking 5 mg and 2.5 mg. Last INR check was done one month ago and was slightly subtherapeutic at 1.9 but patient had been eating more greens.     Peripheral Neuropathy - chronic, pain uncontrolled, had adjust dosing of pregabalin at last visit about a month ago, she not only did not find relief of symptoms with that medication but experienced intolerable dry mouth with it and so she has stopped taking it. Has tried gabapentin in the past as well without relief.     Diabetes - chronic, well controlled, currently taking metformin only.     Hypertension - chronic, blood pressure elevated today to 146/80, patient's blood pressure is typically well controlled, reports she did not take her benazepril today. Does not have a home blood pressure cuff but she lives with her grandson who is a nurse and has one.     The following portions of the patient's history were reviewed and updated as appropriate: allergies, current medications, past family history, past medical history, past social history, past surgical history and problem list.    Review of Systems   Cardiovascular: Negative for chest pain and palpitations.   Endocrine:        Neuropathy in both feet        Objective  Vitals:    10/10/19 1113   BP: 146/80   Pulse: 58   Temp: 98.3 °F (36.8 °C)   SpO2: 99%        Physical Exam   Constitutional: She is oriented to person, place, and time. She appears well-developed and well-nourished. No distress.   HENT:   Head: Normocephalic and atraumatic.   Right Ear: Tympanic membrane, external  ear and ear canal normal.   Left Ear: Tympanic membrane, external ear and ear canal normal.   Nose: Nose normal.   Eyes: Conjunctivae are normal. Right eye exhibits no discharge. Left eye exhibits no discharge. No scleral icterus.   Pulmonary/Chest: Effort normal. No respiratory distress.   Neurological: She is alert and oriented to person, place, and time.   Skin: No erythema. No pallor.   Psychiatric: She has a normal mood and affect. Her behavior is normal. Judgment and thought content normal.     Repeat Manual Blood pressure 158/90    Current Outpatient Medications:   •  acetaminophen (TYLENOL) 500 MG tablet, Take  by mouth., Disp: , Rfl:   •  benazepril (LOTENSIN) 10 MG tablet, TAKE 1 TABLET BY MOUTH DAILY, Disp: 90 tablet, Rfl: 1  •  Calcium Carb-Cholecalciferol (CALCIUM 1000 + D PO), calcium  1000 daily, Disp: , Rfl:   •  Cholecalciferol (VITAMIN D3) 1000 UNIT/SPRAY liquid, Vitamin D3  1 daily, Disp: , Rfl:   •  citalopram (CeleXA) 20 MG tablet, , Disp: , Rfl:   •  metFORMIN (GLUCOPHAGE) 500 MG tablet, Take 500 mg by mouth 4 (Four) Times a Day., Disp: , Rfl:   •  Multiple Vitamin (MULTI VITAMIN DAILY PO), Take  by mouth., Disp: , Rfl:   •  Multiple Vitamins-Minerals (HAIR SKIN NAILS) capsule, Take  by mouth., Disp: , Rfl:   •  pravastatin (PRAVACHOL) 40 MG tablet, Take 40 mg by mouth Daily., Disp: , Rfl:   •  traMADol (ULTRAM) 50 MG tablet, Take 50 mg by mouth Every 6 (Six) Hours As Needed for Moderate Pain ., Disp: , Rfl:   •  TURMERIC PO, Take 1 tablet by mouth Daily., Disp: , Rfl:   •  warfarin (COUMADIN) 2.5 MG tablet, Take 2.5 mg by mouth 3 (Three) Times a Week. 3 times a week, Disp: , Rfl:   •  warfarin (COUMADIN) 5 MG tablet, Take 5 mg by mouth 4 (Four) Times a Week. Take 4 days a week, Disp: , Rfl:   •  zolpidem (AMBIEN) 5 MG tablet, Take 5 mg by mouth., Disp: , Rfl:     Office Visit on 10/10/2019   Component Date Value Ref Range Status   • Protime 10/10/2019 28.7  seconds Final   • INR 10/10/2019  2.4* 0.9 - 1.1 Final   • Hemoglobin A1C 10/10/2019 6.3  % Final             Samira was seen today for coagulation disorder, foot pain and diabetes.    Diagnoses and all orders for this visit:    Long term current use of anticoagulant therapy  - INR today is therapeutic and within goal range at 2.3, continue current warfarin dosing and recheck in 6 weeks.   -     POC Protime / INR    Controlled type 2 diabetes mellitus with complication, without long-term current use of insulin (CMS/[facility])  - A1C today is 6.3, diabetes is will controlled, continue current metformin dosing.   -     POC Glycosylated Hemoglobin (Hb A1C)    Peripheral polyneuropathy  - uncontrolled pain, has not tolerated gabapentin or pregabalin. Discussed therapy options with patient including trying another oral agen ( Duloxetine) vs. Trying a compounded topical containing gabapentin and ketamine. Patient would like to trial topical, [facility] standard neuropathic pain preparation.     Hypertension, essential  - taking benazepril 10 mg, blood pressure is elevated today and was elevated on repeat manual, she forgot her blood pressure medication today. Instructed her to have her grandson help her recheck it at home, she should let me know if it is >140/90.          Return in about 6 weeks (around 11/21/2019) for INR check       Anne Leiva MD

## 2019-10-11 ENCOUNTER — TELEPHONE (OUTPATIENT)
Dept: ORTHOPEDIC SURGERY | Facility: CLINIC | Age: 74
End: 2019-10-11

## 2019-10-11 NOTE — TELEPHONE ENCOUNTER
Regarding: Visit Follow-Up Question  Contact: [phone]  ----- Message from Mychart, Generic sent at 10/11/2019 11:38 AM EDT -----    Please schedule my surgery .

## 2019-10-14 PROBLEM — M75.100 NONTRAUMATIC TEAR OF ROTATOR CUFF: Status: ACTIVE | Noted: 2019-10-14

## 2019-11-01 ENCOUNTER — TELEPHONE (OUTPATIENT)
Dept: ORTHOPEDIC SURGERY | Facility: CLINIC | Age: 74
End: 2019-11-01

## 2019-11-04 NOTE — TELEPHONE ENCOUNTER
Can you please give her a note stating that she is unable to fly due to an upcoming surgery.  The date of her surgery is November 12, 2019.  Thanks.

## 2019-11-06 ENCOUNTER — APPOINTMENT (OUTPATIENT)
Dept: PREADMISSION TESTING | Facility: HOSPITAL | Age: 74
End: 2019-11-06

## 2019-11-06 VITALS
TEMPERATURE: 97.8 F | HEART RATE: 58 BPM | OXYGEN SATURATION: 94 % | RESPIRATION RATE: 16 BRPM | DIASTOLIC BLOOD PRESSURE: 76 MMHG | SYSTOLIC BLOOD PRESSURE: 135 MMHG | HEIGHT: 62 IN | BODY MASS INDEX: 29.72 KG/M2 | WEIGHT: 161.5 LBS

## 2019-11-06 LAB
ANION GAP SERPL CALCULATED.3IONS-SCNC: 11.2 MMOL/L (ref 5–15)
BUN BLD-MCNC: 13 MG/DL (ref 8–23)
BUN/CREAT SERPL: 23.6 (ref 7–25)
CALCIUM SPEC-SCNC: 9.2 MG/DL (ref 8.6–10.5)
CHLORIDE SERPL-SCNC: 103 MMOL/L (ref 98–107)
CO2 SERPL-SCNC: 26.8 MMOL/L (ref 22–29)
CREAT BLD-MCNC: 0.55 MG/DL (ref 0.57–1)
DEPRECATED RDW RBC AUTO: 40.6 FL (ref 37–54)
ERYTHROCYTE [DISTWIDTH] IN BLOOD BY AUTOMATED COUNT: 12.4 % (ref 12.3–15.4)
GFR SERPL CREATININE-BSD FRML MDRD: 108 ML/MIN/1.73
GLUCOSE BLD-MCNC: 114 MG/DL (ref 65–99)
HCT VFR BLD AUTO: 40.1 % (ref 34–46.6)
HGB BLD-MCNC: 13.6 G/DL (ref 12–15.9)
INR PPP: 1.89 (ref 0.9–1.1)
MCH RBC QN AUTO: 30.2 PG (ref 26.6–33)
MCHC RBC AUTO-ENTMCNC: 33.9 G/DL (ref 31.5–35.7)
MCV RBC AUTO: 89.1 FL (ref 79–97)
PLATELET # BLD AUTO: 266 10*3/MM3 (ref 140–450)
PMV BLD AUTO: 10.1 FL (ref 6–12)
POTASSIUM BLD-SCNC: 4.3 MMOL/L (ref 3.5–5.2)
PROTHROMBIN TIME: 21.4 SECONDS (ref 11.7–14.2)
RBC # BLD AUTO: 4.5 10*6/MM3 (ref 3.77–5.28)
SODIUM BLD-SCNC: 141 MMOL/L (ref 136–145)
WBC NRBC COR # BLD: 6.39 10*3/MM3 (ref 3.4–10.8)

## 2019-11-06 PROCEDURE — 80048 BASIC METABOLIC PNL TOTAL CA: CPT | Performed by: ORTHOPAEDIC SURGERY

## 2019-11-06 PROCEDURE — 85027 COMPLETE CBC AUTOMATED: CPT | Performed by: ORTHOPAEDIC SURGERY

## 2019-11-06 PROCEDURE — 36415 COLL VENOUS BLD VENIPUNCTURE: CPT

## 2019-11-06 PROCEDURE — 85610 PROTHROMBIN TIME: CPT | Performed by: ORTHOPAEDIC SURGERY

## 2019-11-06 RX ORDER — BENAZEPRIL HYDROCHLORIDE 10 MG/1
10 TABLET ORAL EVERY MORNING
COMMUNITY
End: 2020-03-31

## 2019-11-06 NOTE — DISCHARGE INSTRUCTIONS
Take the following medications the morning of surgery with a small sip of water:  NO MEDS      General Instructions:  • Do not eat solid food after midnight the night before surgery.  • You may drink clear liquids day of surgery but must stop at least one hour before your hospital arrival time.  • It is beneficial for you to have a clear drink that contains carbohydrates the day of surgery.  We suggest a 12 to 20 ounce bottle of Gatorade or Powerade for non-diabetic patients or a 12 to 20 ounce bottle of G2 or Powerade Zero for diabetic patients. (Pediatric patients, are not advised to drink a 12 to 20 ounce carbohydrate drink)    Clear liquids are liquids you can see through.  Nothing red in color.     Plain water                               Sports drinks  Sodas                                   Gelatin (Jell-O)  Fruit juices without pulp such as white grape juice and apple juice  Popsicles that contain no fruit or yogurt  Tea or coffee (no cream or milk added)  Gatorade / Powerade  G2 / Powerade Zero    • Infants may have breast milk up to four hours before surgery.  • Infants drinking formula may drink formula up to six hours before surgery.   • Patients who avoid smoking, chewing tobacco and alcohol for 4 weeks prior to surgery have a reduced risk of post-operative complications.  Quit smoking as many days before surgery as you can.  • Do not smoke, use chewing tobacco or drink alcohol the day of surgery.   • If applicable bring your C-PAP/ BI-PAP machine.  • Bring any papers given to you in the doctor’s office.  • Wear clean comfortable clothes.  • Do not wear contact lenses, false eyelashes or make-up.  Bring a case for your glasses.   • Bring crutches or walker if applicable.  • Remove all piercings.  Leave jewelry and any other valuables at home.  • Hair extensions with metal clips must be removed prior to surgery.  • The Pre-Admission Testing nurse will instruct you to bring medications if unable to  obtain an accurate list in Pre-Admission Testing.            Preventing a Surgical Site Infection:  • For 2 to 3 days before surgery, avoid shaving with a razor because the razor can irritate skin and make it easier to develop an infection.    • Any areas of open skin can increase the risk of a post-operative wound infection by allowing bacteria to enter and travel throughout the body.  Notify your surgeon if you have any skin wounds / rashes even if it is not near the expected surgical site.  The area will need assessed to determine if surgery should be delayed until it is healed.  • The night prior to surgery sleep in a clean bed with clean clothing.  Do not allow pets to sleep with you.  • Shower on the morning of surgery using a fresh bar of anti-bacterial soap (such as Dial) and clean washcloth.  Dry with a clean towel and dress in clean clothing.  • Ask your surgeon if you will be receiving antibiotics prior to surgery.  • Make sure you, your family, and all healthcare providers clean their hands with soap and water or an alcohol based hand  before caring for you or your wound.    Day of surgery:  Your arrival time is approximately two hours before your scheduled surgery time.  Upon arrival, a Pre-op nurse and Anesthesiologist will review your health history, obtain vital signs, and answer questions you may have.  The only belongings needed at this time will be a list of your home medications and if applicable your C-PAP/BI-PAP machine.  If you are staying overnight your family can leave the rest of your belongings in the car and bring them to your room later.  A Pre-op nurse will start an IV and you may receive medication in preparation for surgery, including something to help you relax.  Your family will be able to see you in the Pre-op area.  Two visitors at a time will be allowed in the Pre-op room.  While you are in surgery your family should notify the waiting room  if they leave the  waiting room area and provide a contact phone number.    Please be aware that surgery does come with discomfort.  We want to make every effort to control your discomfort so please discuss any uncontrolled symptoms with your nurse.   Your doctor will most likely have prescribed pain medications.      If you are going home after surgery you will receive individualized written care instructions before being discharged.  A responsible adult must drive you to and from the hospital on the day of your surgery and stay with you for 24 hours.    If you are staying overnight following surgery, you will be transported to your hospital room following the recovery period.  [facility] has all private rooms.    You have received a list of surgical assistants for your reference.  If you have any questions please call Pre-Admission Testing at [phone].  Deductibles and co-payments are collected on the day of service. Please be prepared to pay the required co-pay, deductible or deposit on the day of service as defined by your plan.

## 2019-11-11 RX ORDER — PRAVASTATIN SODIUM 40 MG
TABLET ORAL
Qty: 90 TABLET | Refills: 0 | Status: SHIPPED | OUTPATIENT
Start: 2019-11-11 | End: 2020-02-07

## 2019-11-12 ENCOUNTER — HOSPITAL ENCOUNTER (OUTPATIENT)
Facility: HOSPITAL | Age: 74
Setting detail: HOSPITAL OUTPATIENT SURGERY
Discharge: HOME | End: 2019-11-12
Attending: ORTHOPAEDIC SURGERY | Admitting: ORTHOPAEDIC SURGERY

## 2019-11-12 ENCOUNTER — ANESTHESIA (OUTPATIENT)
Dept: PERIOP | Facility: HOSPITAL | Age: 74
End: 2019-11-12

## 2019-11-12 ENCOUNTER — ANESTHESIA EVENT (OUTPATIENT)
Dept: PERIOP | Facility: HOSPITAL | Age: 74
End: 2019-11-12

## 2019-11-12 VITALS
RESPIRATION RATE: 16 BRPM | DIASTOLIC BLOOD PRESSURE: 86 MMHG | SYSTOLIC BLOOD PRESSURE: 114 MMHG | HEART RATE: 66 BPM | TEMPERATURE: 97.6 F | OXYGEN SATURATION: 93 %

## 2019-11-12 DIAGNOSIS — M75.100 NONTRAUMATIC TEAR OF ROTATOR CUFF, UNSPECIFIED LATERALITY, UNSPECIFIED TEAR EXTENT: ICD-10-CM

## 2019-11-12 LAB
GLUCOSE BLDC GLUCOMTR-MCNC: 121 MG/DL (ref 70–130)
INR PPP: 1.1 (ref 0.9–1.1)
PROTHROMBIN TIME: 13.9 SECONDS (ref 11.7–14.2)

## 2019-11-12 PROCEDURE — 25010000003 BUPIVACAINE LIPOSOME 1.3 % SUSPENSION: Performed by: ANESTHESIOLOGY

## 2019-11-12 PROCEDURE — 29827 SHO ARTHRS SRG RT8TR CUF RPR: CPT | Performed by: ORTHOPAEDIC SURGERY

## 2019-11-12 PROCEDURE — 25010000002 ONDANSETRON PER 1 MG: Performed by: NURSE ANESTHETIST, CERTIFIED REGISTERED

## 2019-11-12 PROCEDURE — 25010000002 EPINEPHRINE PER 0.1 MG: Performed by: ORTHOPAEDIC SURGERY

## 2019-11-12 PROCEDURE — 25010000002 MIDAZOLAM PER 1 MG: Performed by: ANESTHESIOLOGY

## 2019-11-12 PROCEDURE — 25010000002 NEOSTIGMINE PER 0.5 MG: Performed by: NURSE ANESTHETIST, CERTIFIED REGISTERED

## 2019-11-12 PROCEDURE — 29824 SHO ARTHRS SRG DSTL CLAVICLC: CPT | Performed by: ORTHOPAEDIC SURGERY

## 2019-11-12 PROCEDURE — 29826 SHO ARTHRS SRG DECOMPRESSION: CPT | Performed by: ORTHOPAEDIC SURGERY

## 2019-11-12 PROCEDURE — 25010000003 CEFAZOLIN IN DEXTROSE 2-4 GM/100ML-% SOLUTION: Performed by: ORTHOPAEDIC SURGERY

## 2019-11-12 PROCEDURE — 25010000002 PROPOFOL 10 MG/ML EMULSION: Performed by: NURSE ANESTHETIST, CERTIFIED REGISTERED

## 2019-11-12 PROCEDURE — C1713 ANCHOR/SCREW BN/BN,TIS/BN: HCPCS | Performed by: ORTHOPAEDIC SURGERY

## 2019-11-12 PROCEDURE — 29824 SHO ARTHRS SRG DSTL CLAVICLC: CPT | Performed by: NURSE PRACTITIONER

## 2019-11-12 PROCEDURE — 25010000002 FENTANYL CITRATE (PF) 100 MCG/2ML SOLUTION: Performed by: ANESTHESIOLOGY

## 2019-11-12 PROCEDURE — C9290 INJ, BUPIVACAINE LIPOSOME: HCPCS | Performed by: ANESTHESIOLOGY

## 2019-11-12 PROCEDURE — 85610 PROTHROMBIN TIME: CPT | Performed by: ORTHOPAEDIC SURGERY

## 2019-11-12 PROCEDURE — 29827 SHO ARTHRS SRG RT8TR CUF RPR: CPT | Performed by: NURSE PRACTITIONER

## 2019-11-12 PROCEDURE — 82962 GLUCOSE BLOOD TEST: CPT

## 2019-11-12 PROCEDURE — 29826 SHO ARTHRS SRG DECOMPRESSION: CPT | Performed by: NURSE PRACTITIONER

## 2019-11-12 DEVICE — SUT/ANCH FIBERTAK RC W/3 1.3MM SUT TP: Type: IMPLANTABLE DEVICE | Site: SHOULDER | Status: FUNCTIONAL

## 2019-11-12 DEVICE — SUT/ANCH BIOCOMP SWIVELOCK/C 4.75X19.1MM: Type: IMPLANTABLE DEVICE | Site: SHOULDER | Status: FUNCTIONAL

## 2019-11-12 RX ORDER — HYDRALAZINE HYDROCHLORIDE 20 MG/ML
5 INJECTION INTRAMUSCULAR; INTRAVENOUS
Status: DISCONTINUED | OUTPATIENT
Start: 2019-11-12 | End: 2019-11-12 | Stop reason: HOSPADM

## 2019-11-12 RX ORDER — PROMETHAZINE HYDROCHLORIDE 25 MG/ML
6.25 INJECTION, SOLUTION INTRAMUSCULAR; INTRAVENOUS ONCE AS NEEDED
Status: DISCONTINUED | OUTPATIENT
Start: 2019-11-12 | End: 2019-11-12 | Stop reason: HOSPADM

## 2019-11-12 RX ORDER — GLYCOPYRROLATE 0.2 MG/ML
INJECTION INTRAMUSCULAR; INTRAVENOUS AS NEEDED
Status: DISCONTINUED | OUTPATIENT
Start: 2019-11-12 | End: 2019-11-12 | Stop reason: SURG

## 2019-11-12 RX ORDER — EPHEDRINE SULFATE 50 MG/ML
INJECTION, SOLUTION INTRAVENOUS AS NEEDED
Status: DISCONTINUED | OUTPATIENT
Start: 2019-11-12 | End: 2019-11-12 | Stop reason: SURG

## 2019-11-12 RX ORDER — ONDANSETRON 4 MG/1
4 TABLET, FILM COATED ORAL EVERY 8 HOURS PRN
Qty: 30 TABLET | Refills: 0 | Status: SHIPPED | OUTPATIENT
Start: 2019-11-12 | End: 2020-02-25

## 2019-11-12 RX ORDER — SODIUM CHLORIDE 0.9 % (FLUSH) 0.9 %
3 SYRINGE (ML) INJECTION EVERY 12 HOURS SCHEDULED
Status: DISCONTINUED | OUTPATIENT
Start: 2019-11-12 | End: 2019-11-12 | Stop reason: HOSPADM

## 2019-11-12 RX ORDER — MIDAZOLAM HYDROCHLORIDE 1 MG/ML
1 INJECTION INTRAMUSCULAR; INTRAVENOUS
Status: DISCONTINUED | OUTPATIENT
Start: 2019-11-12 | End: 2019-11-12 | Stop reason: HOSPADM

## 2019-11-12 RX ORDER — PROMETHAZINE HYDROCHLORIDE 25 MG/1
25 SUPPOSITORY RECTAL ONCE AS NEEDED
Status: DISCONTINUED | OUTPATIENT
Start: 2019-11-12 | End: 2019-11-12 | Stop reason: HOSPADM

## 2019-11-12 RX ORDER — ROCURONIUM BROMIDE 10 MG/ML
INJECTION, SOLUTION INTRAVENOUS AS NEEDED
Status: DISCONTINUED | OUTPATIENT
Start: 2019-11-12 | End: 2019-11-12 | Stop reason: SURG

## 2019-11-12 RX ORDER — DIPHENHYDRAMINE HYDROCHLORIDE 50 MG/ML
12.5 INJECTION INTRAMUSCULAR; INTRAVENOUS
Status: DISCONTINUED | OUTPATIENT
Start: 2019-11-12 | End: 2019-11-12 | Stop reason: HOSPADM

## 2019-11-12 RX ORDER — SODIUM CHLORIDE, SODIUM LACTATE, POTASSIUM CHLORIDE, CALCIUM CHLORIDE 600; 310; 30; 20 MG/100ML; MG/100ML; MG/100ML; MG/100ML
9 INJECTION, SOLUTION INTRAVENOUS CONTINUOUS
Status: DISCONTINUED | OUTPATIENT
Start: 2019-11-12 | End: 2019-11-12 | Stop reason: HOSPADM

## 2019-11-12 RX ORDER — HYDROMORPHONE HYDROCHLORIDE 1 MG/ML
0.25 INJECTION, SOLUTION INTRAMUSCULAR; INTRAVENOUS; SUBCUTANEOUS
Status: DISCONTINUED | OUTPATIENT
Start: 2019-11-12 | End: 2019-11-12 | Stop reason: HOSPADM

## 2019-11-12 RX ORDER — DOCUSATE SODIUM 100 MG/1
100 CAPSULE, LIQUID FILLED ORAL 2 TIMES DAILY
Qty: 60 CAPSULE | Refills: 0 | Status: SHIPPED | OUTPATIENT
Start: 2019-11-12 | End: 2021-01-06 | Stop reason: HOSPADM

## 2019-11-12 RX ORDER — CEFAZOLIN SODIUM 2 G/100ML
2 INJECTION, SOLUTION INTRAVENOUS ONCE
Status: COMPLETED | OUTPATIENT
Start: 2019-11-12 | End: 2019-11-12

## 2019-11-12 RX ORDER — PROMETHAZINE HYDROCHLORIDE 25 MG/1
25 TABLET ORAL ONCE AS NEEDED
Status: DISCONTINUED | OUTPATIENT
Start: 2019-11-12 | End: 2019-11-12 | Stop reason: HOSPADM

## 2019-11-12 RX ORDER — MIDAZOLAM HYDROCHLORIDE 1 MG/ML
2 INJECTION INTRAMUSCULAR; INTRAVENOUS
Status: DISCONTINUED | OUTPATIENT
Start: 2019-11-12 | End: 2019-11-12 | Stop reason: HOSPADM

## 2019-11-12 RX ORDER — ACETAMINOPHEN 650 MG
TABLET, EXTENDED RELEASE ORAL AS NEEDED
Status: DISCONTINUED | OUTPATIENT
Start: 2019-11-12 | End: 2019-11-12 | Stop reason: HOSPADM

## 2019-11-12 RX ORDER — PROPOFOL 10 MG/ML
VIAL (ML) INTRAVENOUS AS NEEDED
Status: DISCONTINUED | OUTPATIENT
Start: 2019-11-12 | End: 2019-11-12 | Stop reason: SURG

## 2019-11-12 RX ORDER — ACETAMINOPHEN 325 MG/1
650 TABLET ORAL ONCE AS NEEDED
Status: DISCONTINUED | OUTPATIENT
Start: 2019-11-12 | End: 2019-11-12 | Stop reason: HOSPADM

## 2019-11-12 RX ORDER — BUPIVACAINE HYDROCHLORIDE 2.5 MG/ML
INJECTION, SOLUTION EPIDURAL; INFILTRATION; INTRACAUDAL
Status: COMPLETED | OUTPATIENT
Start: 2019-11-12 | End: 2019-11-12

## 2019-11-12 RX ORDER — HYDROCODONE BITARTRATE AND ACETAMINOPHEN 7.5; 325 MG/1; MG/1
1-2 TABLET ORAL EVERY 4 HOURS PRN
Qty: 42 TABLET | Refills: 0 | Status: SHIPPED | OUTPATIENT
Start: 2019-11-12 | End: 2019-11-18 | Stop reason: SDUPTHER

## 2019-11-12 RX ORDER — LIDOCAINE HYDROCHLORIDE 20 MG/ML
INJECTION, SOLUTION INFILTRATION; PERINEURAL AS NEEDED
Status: DISCONTINUED | OUTPATIENT
Start: 2019-11-12 | End: 2019-11-12 | Stop reason: SURG

## 2019-11-12 RX ORDER — SODIUM CHLORIDE 0.9 % (FLUSH) 0.9 %
3-10 SYRINGE (ML) INJECTION AS NEEDED
Status: DISCONTINUED | OUTPATIENT
Start: 2019-11-12 | End: 2019-11-12 | Stop reason: HOSPADM

## 2019-11-12 RX ORDER — ACETAMINOPHEN 650 MG/1
650 SUPPOSITORY RECTAL ONCE AS NEEDED
Status: DISCONTINUED | OUTPATIENT
Start: 2019-11-12 | End: 2019-11-12 | Stop reason: HOSPADM

## 2019-11-12 RX ORDER — HYDROCODONE BITARTRATE AND ACETAMINOPHEN 7.5; 325 MG/1; MG/1
1 TABLET ORAL EVERY 6 HOURS PRN
Status: DISCONTINUED | OUTPATIENT
Start: 2019-11-12 | End: 2019-11-12 | Stop reason: HOSPADM

## 2019-11-12 RX ORDER — NALOXONE HCL 0.4 MG/ML
0.4 VIAL (ML) INJECTION AS NEEDED
Status: DISCONTINUED | OUTPATIENT
Start: 2019-11-12 | End: 2019-11-12 | Stop reason: HOSPADM

## 2019-11-12 RX ORDER — FENTANYL CITRATE 50 UG/ML
25 INJECTION, SOLUTION INTRAMUSCULAR; INTRAVENOUS
Status: DISCONTINUED | OUTPATIENT
Start: 2019-11-12 | End: 2019-11-12 | Stop reason: HOSPADM

## 2019-11-12 RX ORDER — LIDOCAINE HYDROCHLORIDE 10 MG/ML
0.5 INJECTION, SOLUTION EPIDURAL; INFILTRATION; INTRACAUDAL; PERINEURAL ONCE AS NEEDED
Status: DISCONTINUED | OUTPATIENT
Start: 2019-11-12 | End: 2019-11-12 | Stop reason: HOSPADM

## 2019-11-12 RX ORDER — ONDANSETRON 2 MG/ML
INJECTION INTRAMUSCULAR; INTRAVENOUS AS NEEDED
Status: DISCONTINUED | OUTPATIENT
Start: 2019-11-12 | End: 2019-11-12 | Stop reason: SURG

## 2019-11-12 RX ORDER — ACETAMINOPHEN 500 MG
500 TABLET ORAL ONCE
Status: COMPLETED | OUTPATIENT
Start: 2019-11-12 | End: 2019-11-12

## 2019-11-12 RX ORDER — FENTANYL CITRATE 50 UG/ML
50 INJECTION, SOLUTION INTRAMUSCULAR; INTRAVENOUS
Status: DISCONTINUED | OUTPATIENT
Start: 2019-11-12 | End: 2019-11-12 | Stop reason: HOSPADM

## 2019-11-12 RX ADMIN — BUPIVACAINE HYDROCHLORIDE 10 ML: 2.5 INJECTION, SOLUTION EPIDURAL; INFILTRATION; INTRACAUDAL; PERINEURAL at 09:29

## 2019-11-12 RX ADMIN — EPHEDRINE SULFATE 10 MG: 50 INJECTION INTRAMUSCULAR; INTRAVENOUS; SUBCUTANEOUS at 10:48

## 2019-11-12 RX ADMIN — ONDANSETRON 4 MG: 2 INJECTION INTRAMUSCULAR; INTRAVENOUS at 10:52

## 2019-11-12 RX ADMIN — HYDROCODONE BITARTRATE AND ACETAMINOPHEN 1 TABLET: 7.5; 325 TABLET ORAL at 12:46

## 2019-11-12 RX ADMIN — GLYCOPYRROLATE 0.5 MG: 0.2 INJECTION INTRAMUSCULAR; INTRAVENOUS at 11:06

## 2019-11-12 RX ADMIN — FENTANYL CITRATE 25 MCG: 50 INJECTION INTRAMUSCULAR; INTRAVENOUS at 09:23

## 2019-11-12 RX ADMIN — FENTANYL CITRATE 25 MCG: 50 INJECTION INTRAMUSCULAR; INTRAVENOUS at 09:25

## 2019-11-12 RX ADMIN — ROCURONIUM BROMIDE 40 MG: 10 INJECTION, SOLUTION INTRAVENOUS at 09:46

## 2019-11-12 RX ADMIN — SODIUM CHLORIDE, POTASSIUM CHLORIDE, SODIUM LACTATE AND CALCIUM CHLORIDE 9 ML/HR: 600; 310; 30; 20 INJECTION, SOLUTION INTRAVENOUS at 09:16

## 2019-11-12 RX ADMIN — FENTANYL CITRATE 50 MCG: 50 INJECTION INTRAMUSCULAR; INTRAVENOUS at 11:51

## 2019-11-12 RX ADMIN — BUPIVACAINE 10 ML: 13.3 INJECTION, SUSPENSION, LIPOSOMAL INFILTRATION at 09:29

## 2019-11-12 RX ADMIN — PROPOFOL 140 MG: 10 INJECTION, EMULSION INTRAVENOUS at 09:46

## 2019-11-12 RX ADMIN — Medication 3.5 MG: at 11:07

## 2019-11-12 RX ADMIN — EPHEDRINE SULFATE 10 MG: 50 INJECTION INTRAMUSCULAR; INTRAVENOUS; SUBCUTANEOUS at 10:38

## 2019-11-12 RX ADMIN — LIDOCAINE HYDROCHLORIDE 80 MG: 20 INJECTION, SOLUTION INFILTRATION; PERINEURAL at 09:46

## 2019-11-12 RX ADMIN — FENTANYL CITRATE 50 MCG: 50 INJECTION INTRAMUSCULAR; INTRAVENOUS at 13:51

## 2019-11-12 RX ADMIN — MIDAZOLAM 2 MG: 1 INJECTION INTRAMUSCULAR; INTRAVENOUS at 09:17

## 2019-11-12 RX ADMIN — CEFAZOLIN SODIUM 2 G: 2 INJECTION, SOLUTION INTRAVENOUS at 09:50

## 2019-11-12 RX ADMIN — FENTANYL CITRATE 25 MCG: 50 INJECTION INTRAMUSCULAR; INTRAVENOUS at 09:18

## 2019-11-12 RX ADMIN — ACETAMINOPHEN 500 MG: 500 TABLET, FILM COATED ORAL at 09:17

## 2019-11-12 RX ADMIN — FENTANYL CITRATE 25 MCG: 50 INJECTION INTRAMUSCULAR; INTRAVENOUS at 09:20

## 2019-11-12 RX ADMIN — FENTANYL CITRATE 50 MCG: 50 INJECTION INTRAMUSCULAR; INTRAVENOUS at 13:40

## 2019-11-12 NOTE — ANESTHESIA POSTPROCEDURE EVALUATION
Patient: Samira Workman    Procedure Summary     Date:  11/12/19 Room / Location:   [facility]    Anesthesia Start:  0938 Anesthesia Stop:  1119    Procedure:  Arthroscopic rotator cuff repair with subacromial decompression, Arthroscopic distal clavicle excision, and arthroscopic labral debridement  (Left Shoulder) Diagnosis:       Nontraumatic tear of rotator cuff, unspecified laterality, unspecified tear extent      (Nontraumatic tear of rotator cuff, unspecified laterality, unspecified tear extent [M75.100])    Surgeon:  Cresencio Christina MD Provider:  Anthony Nur MD    Anesthesia Type:  general with block ASA Status:  3          Anesthesia Type: general with block  Last vitals  BP   114/86 (11/12/19 1346)   Temp   36.4 °C (97.6 °F) (11/12/19 1245)   Pulse   66 (11/12/19 1320)   Resp   16 (11/12/19 1320)     SpO2   92 % (11/12/19 1346)     Post Anesthesia Care and Evaluation    Patient location during evaluation: PHASE II  Patient participation: complete - patient participated  Level of consciousness: awake  Pain management: adequate  Airway patency: patent  Anesthetic complications: No anesthetic complications    Cardiovascular status: acceptable  Respiratory status: acceptable  Hydration status: acceptable    Comments: /86 (BP Location: Right arm, Patient Position: Lying)   Pulse 66   Temp 36.4 °C (97.6 °F) (Temporal)   Resp 16   SpO2 92%

## 2019-11-12 NOTE — ANESTHESIA PROCEDURE NOTES
Peripheral Block    Pre-sedation assessment completed: 11/12/2019 9:21 AM    Patient reassessed immediately prior to procedure    Patient location during procedure: pre-op  Start time: 11/12/2019 9:22 AM  Stop time: 11/12/2019 9:29 AM  Reason for block: at surgeon's request and post-op pain management  Performed by  Anesthesiologist: Mars Steele MD  Preanesthetic Checklist  Completed: patient identified, site marked, surgical consent, pre-op evaluation, timeout performed, IV checked, risks and benefits discussed and monitors and equipment checked  Prep:  Sterile barriers:gloves  Prep: ChloraPrep  Patient monitoring: blood pressure monitoring, continuous pulse oximetry and EKG  Procedure  Sedation:yes    Guidance:ultrasound guided  ULTRASOUND INTERPRETATION.  Using ultrasound guidance a 22 G gauge needle was placed in close proximity to the brachial plexus nerve, at which point, under ultrasound guidance anesthetic was injected in the area of the nerve and spread of the anesthesia was seen on ultrasound in close proximity thereto.  There were no abnormalities seen on ultrasound; a digital image was taken; and the patient tolerated the procedure with no complications. Images:still images obtained    Laterality:left  Block Type:interscalene  Injection Technique:single-shot  Needle Type:short-bevel  Needle Gauge:22 G      Medications Used: bupivacaine liposome (EXPAREL) 1.3 % injection, 10 mL  bupivacaine PF (MARCAINE) 0.25 % injection, 10 mL      Medications  Comment:.    Post Assessment  Injection Assessment: negative aspiration for heme, no paresthesia on injection and incremental injection  Patient Tolerance:comfortable throughout block  Complications:no

## 2019-11-12 NOTE — H&P
Patient: Samira Workman    YOB: 1945    Medical Record Number: [id number]    Chief Complaints:   Left shoulder pain    History of Present Illness:     74 y.o. female patient who presents with left shoulder pain and weakness.  Her symptoms started after a fall that occurred on July 14.  She was asymptomatic prior to this fall.  Pain is described as moderate to severe, constant and aching.  She gets intermittent stabbing pains with range of motion.  Anti-inflammatories have helped modestly.  She reports weakness when trying to reach or lift.  Denies any other associated complaints or issues.    Allergies:   Allergies   Allergen Reactions   • Ciprofibrate Diarrhea   • Ciprofloxacin Diarrhea   • Gabapentin Other (See Comments)     Severe dry mouth.   • Sulfa Antibiotics Itching       Home Medications:    Current Facility-Administered Medications:   •  ceFAZolin in dextrose (ANCEF) IVPB solution 2 g, 2 g, Intravenous, Once, Cresencio Christina MD    Past Medical History:   Diagnosis Date   • Anxiety    • Arthritis    • Depression    • Diabetes mellitus (CMS/[facility])    • Hyperlipidemia    • Hypertension    • Low back pain    • Neuropathy    • OA (osteoarthritis)    • Paroxysmal atrial fibrillation (CMS/HCC) 8/6/2016   • Rotator cuff tear        Past Surgical History:   Procedure Laterality Date   • BACK SURGERY      X2   • BLADDER SUSPENSION     • CHOLECYSTECTOMY     • HYSTERECTOMY  1970   • KNEE ARTHROPLASTY Right    • SHOULDER SURGERY Right        Social History     Occupational History   • Not on file   Tobacco Use   • Smoking status: Never Smoker   • Smokeless tobacco: Never Used   Substance and Sexual Activity   • Alcohol use: Yes     Frequency: Monthly or less     Comment: occasionally socially   • Drug use: No   • Sexual activity: Defer      Social History     Social History Narrative   • Not on file       Family History   Problem Relation Age of Onset   • Thyroid disease Mother    • Anxiety disorder Mother     • Depression Mother    • Bipolar disorder Mother    • Heart failure Father    • Hypertension Father    • Kidney nephrosis Brother    • Malig Hyperthermia Neg Hx        Review of Systems:      Constitutional: Denies fever, shaking or chills   Eyes: Denies change in visual acuity   HEENT: Denies nasal congestion or sore throat   Respiratory: Denies cough or shortness of breath   Cardiovascular: Denies chest pain or edema  Endocrine: Denies tremors, palpitations, intolerance of heat or cold, polyuria, polydipsia.  GI: Denies abdominal pain, nausea, vomiting, bloody stools or diarrhea  : Denies frequency, urgency, incontinence, retention, or nocturia.  Musculoskeletal: Denies numbness, tingling or loss of motor function except as above  Integument: Denies rash, lesion or ulceration   Neurologic: Denies headache or focal weakness, deficits  Heme: Denies spontaneous or excessive bleeding, epistaxis, hematuria, melena, fatigue, enlarged or tender lymph nodes.      All other pertinent positives and negatives as noted above in HPI.    Physical Exam: 74 y.o. female    There were no vitals filed for this visit.  General:  Patient is awake and alert.  Appears in no acute distress or discomfort.    Psych:  Affect and demeanor are appropriate.    Eyes:  Conjunctiva and sclera appear grossly normal.  Eyes track well and EOM seem to be intact.    Ears:  No gross abnormalities.  Hearing adequate for the exam.    Cardiovascular:  Regular rate and rhythm.    Lungs:  Good chest expansion.  Breathing unlabored.    Lymph:  No palpable adenopathy about neck or axilla.    Neck:  Supple.  Normal ROM.  Negative Spurling's for shoulder or arm pain.    Left upper extremity:  Skin is benign.  No gross abnormalities on inspection including any atrophy, swellings, or masses.  No palpable masses or adenopathy.  Focal tenderness over the acromioclavicular joint.  Positive active compression maneuver..  Full shoulder motion.  No evident  instability or apprehension.  Mildly positive Neer Tapia.  Weakness with forward elevation in the scapular plane.  Good strength with internal and external rotation.  Good strength in the deltoid, biceps, triceps, and .  Intact sensation throughout the arm.  Brisk cap refill.  Palpable radial pulse.  Good skin turgor.         Radiology:   Outside x-ray and MRI reports are available for review.  We do not have the images themselves at this time but are working on acquiring those.  X-rays show moderate acromioclavicular arthritis.  MRI shows a full-thickness anterior supraspinatus tendon tear measuring less than a centimeter.  There is moderate associated tendinopathy.  There is extensive acromioclavicular joint arthropathy with periarticular inflammation and edema.  There is also mention of a suspected small degenerative labral tear.    Assessment/Plan:   Left rotator cuff tendon tear and acromioclavicular separation with acromioclavicular arthritis    We discussed the natural history of full-thickness rotator cuff tears.  I think she probably  her shoulder in the fall as well.  We discussed conservative treatment options.  I think is reasonable to try an injection and therapy.  She tells me that she was once given a cortisone shot for poison ivy.  It caused her a severe headache and facial erythema.  She also started to develop some swelling.  She is fearful that she has an allergy to cortisone and is adamantly opposed to any further injections.  We talked about therapy but she understands that this is not going to cure the tear.  She does not want to go that route.  She would like to get this fixed.  I explained that she will need medical clearance.  She tells me that she has already obtained this and permission to come off of the Coumadin.    We had a thorough discussion regarding the risks, benefits and alternatives to an arthroscopic rotator cuff repair with distal clavicle excision and  non-surgical management versus surgery.  I explained that surgical risks include infection, hematoma, anchor related complications including failure of fixation, loosening, cutout of the anchors, chondrolysis, rotator cuff re-tear necessitating revision surgery, persistent pain and/or loss of motion, iatrogenic nerve and/or blood vessel injury resulting in permanent weakness, numbness or dysfunction, RSD, DVT, PE, positioning related neuropraxia, and anesthesia related complications resulting in death.  We further discussed the possible need to address the biceps with a possible tenotomy versus tenodesis.  We discussed the fact that if the biceps demonstrates significant pathology in the groove that I would plan to perform a tenotomy which could result in david deformity or persistent pain, weakness or cramping.  We also discussed possible risks with a tenodesis as well including screw related complications including cutout, chondrolysis, failure of fixation with re-tear of the biceps, fracture and possible iatrogenic nerve injury.  The patient voiced understanding of the risks, benefits, and alternative forms of treatment that were discussed and the patient consents to proceed with an arthroscopic repair and distal clavicle excision.      Cresencio Chirstina MD    09/30/2019    CC to Anne So MD

## 2019-11-12 NOTE — BRIEF OP NOTE
SHOULDER ARTHROSCOPY WITH ROTATOR CUFF REPAIR  Progress Note    Samira Workman  11/12/2019    Pre-op Diagnosis:   Nontraumatic tear of rotator cuff, unspecified laterality, unspecified tear extent [M75.100]       Post-Op Diagnosis Codes:     * Nontraumatic tear of rotator cuff, unspecified laterality, unspecified tear extent [M75.100]    Procedure/CPT® Codes:      Procedure(s):  SHOULDER ARTHROSCOPY WITH ROTATOR CUFF REPAIR, distal clavicle excision    Surgeon(s):  Cresencio Christina MD    Anesthesia: General with Block    Staff:   Circulator: Donna Samuels RN; Sujata Quiros RN  Scrub Person: Vicky Cabrales  Assistant: Tsering Ennis APRN    Estimated Blood Loss: minimal    Urine Voided: * No values recorded between 11/12/2019  9:35 AM and 11/12/2019 11:14 AM *    Specimens:                None          Drains:      Findings: see dictation    Complications: none      Cresencio Christina MD     Date: 11/12/2019  Time: 11:16 AM

## 2019-11-12 NOTE — ANESTHESIA PREPROCEDURE EVALUATION
Anesthesia Evaluation     no history of anesthetic complications:  NPO Solid Status: > 8 hours             Airway   Mallampati: II  TM distance: <3 FB  Neck ROM: full  Possible difficult intubation  Dental - normal exam     Pulmonary - negative pulmonary ROS and normal exam   (-) wheezes  Cardiovascular     ECG reviewed  Rhythm: regular    (+) hypertension, dysrhythmias Atrial Fib, hyperlipidemia,   (-) murmur, carotid bruits      Neuro/Psych  (+) psychiatric history,       ROS Comment: Hx of neuropathy from back problems, takes ambien at night.  Unable to take lyrica or gabapentin  GI/Hepatic/Renal/Endo      Musculoskeletal     Abdominal    Substance History      OB/GYN          Other                        Anesthesia Plan    ASA 3     general with block   (Risks of peripheral nerve block were discussed with patient including but not limited to: inadequate block, bleeding, infection, persistent numbness or weakness, nerve damage, painful dysasthesia and need to protect limb while numb.  D/W R&B of GA including but not limited to: heart, lung, liver, kidney, neurologic problems, positioning injuries, dental damage, corneal abrasion and TMJ.  .)  intravenous induction     Anesthetic plan, all risks, benefits, and alternatives have been provided, discussed and informed consent has been obtained with: patient.

## 2019-11-12 NOTE — ANESTHESIA PROCEDURE NOTES
Airway  Urgency: elective    Date/Time: 11/12/2019 9:48 AM  Airway not difficult    General Information and Staff    Patient location during procedure: OR  CRNA: Thuy Ann CRNA    Indications and Patient Condition  Indications for airway management: airway protection    Preoxygenated: yes  Mask difficulty assessment: 1 - vent by mask    Final Airway Details  Final airway type: endotracheal airway      Successful airway: ETT  Cuffed: yes   Successful intubation technique: direct laryngoscopy  Endotracheal tube insertion site: oral  Blade: Pascual  Blade size: 3  ETT size (mm): 7.0  Cormack-Lehane Classification: grade I - full view of glottis  Placement verified by: chest auscultation and capnometry   Measured from: lips  ETT/EBT  to lips (cm): 21  Number of attempts at approach: 1  Assessment: lips, teeth, and gum same as pre-op and atraumatic intubation    Additional Comments   ett cuff up at MOP

## 2019-11-12 NOTE — OP NOTE
Orthopaedic Operative Note    Facility: [facility]    Patient: Samira Workman    Medical Record Number: [id number]    YOB: 1945    Dictating Surgeon: Cresencio Christina M.D.*    Primary Care Physician: Anne So MD    Date of Operation: 11/12/2019    Pre-Operative Diagnosis:  Left rotator cuff tendon tear, symptomatic acromioclavicular arthritis     Post-Operative Diagnosis:  Left rotator cuff tendon tear, symptomatic acromioclavicular arthritis    Procedure Performed: 1.  Arthroscopic rotator cuff repair with subacromial decompression  2.  Arthroscopic distal clavicle excision  3.  Arthroscopic labral debridement    Surgeon: Cresencio Christina MD     Assistant: SALINAS Vaughan    Anesthesia: Regional followed general.     Complications: None.     Estimated Blood Loss: Less than 50 mL.     Implants: 1 triple loaded Arthrex fiber tack anchor for medial row rotator cuff repair.  2 Arthrex 4.75 mm swivel lock anchors for supplemental lateral row fixation    Specimens:   Order Name Source Comment Collection Info Order Time   PROTIME-INR   Collected By: Annalisa Gunter RN 11/12/2019  8:57 AM       Brief Operative Indication:  The patient had a history of a torn left rotator cuff and acromioclavicular arthritis which had been persistently symptomatic.  We talked about surgical and non-surgical treatment options.  The patient was felt to be a candidate for repair.   I explained that surgical risks include infection, hematoma, hardware related complications including failure of fixation, cutout, arthrofibrosis, re-tear, persistent pain and/or loss of motion, iatrogenic nerve and/or blood vessel injury resulting in permanent weakness, numbness or dysfunction, DVT, PE, positioning related neuropraxia, and anesthesia related complications resulting in death.     Description of Procedure in Detail:  The patient and operative site were identified in the preoperative holding area.  The  surgical site was marked with the patient's confirmation.  Adequate regional anesthesia of the left upper extremity was administered by the anesthesiologist.  The patient was then taken to the operating room and placed in the supine position.  Adequate general anesthesia was then administered.  The patient was repositioned into the lateral decubitus position.  All bony prominences were carefully padded and protected.  The left upper extremity was prepped and draped in the standard sterile fashion.  I cleaned the extremity with an alcohol solution.  A Hibiclens scrub was performed.  Lastly, the extremity was prepped with 2 ChloraPrep preps.  I allowed those to dry for approximately 3 minutes before the draping procedure was carried out.  A timeout was taken and preoperative antibiotics administered prior to surgical incision.      The arm was placed into 10 pounds of lateral traction.  A standard posterior portal was established.  The scope was inserted into the joint and directed anteriorly to the rotator interval where a standard anterior portal was established.  A 7 mm cannula was inserted into the joint and then a diagnostic arthroscopy performed.  There was a full-thickness tear of the anterior supraspinatus, as predicted by the MRI.  The subscapularis, posterior supraspinatus, infraspinatus and teres minor were all intact.  Her articular cartilage also appeared benign.  No significant chondral pathology was noted.  There was fraying and partial flap tearing of the superior labrum.  The biceps seem to be firmly anchored.  The biceps appeared to be devoid of any significant pathology.  The degenerative superior labral tear was debrided with a shaver.  The remainder of the labrum was probed and confirmed to be intact and stable.    Having completed the work in the joint, I then directed my attention to the subacromial space.  A lateral portal was established approximately 2 cm off the lateral edge of the acromion.   A shaver was inserted and then an extensive bursectomy performed.  There was a modest subacromial spur and fraying of the coracoacromial ligament consistent with impingement.  An Arthrex Apollo device was used to release the coracoacromial ligament.  An acromioplasty was performed in the typical fashion using a barrel tip nohemy.      The rotator cuff tear was then examined from the bursal side.  This was an approximately 1.5 cm minimally retracted tear.  The tissue was degenerative and had a yellowish discoloration.  This degenerative tissue was debrided.  The remaining tissue was easily opposable to the cuff insertion site for a repair.  The rotator cuff insertion site was debrided back to bleeding healthy-appearing bone to create a healing response.  I also microfractured the tuberosity to create a bone marrow healing response.  Through the lateral portal I had good access to the insertion point for the anchor.  A single Arthrex triple loaded anchor was drilled and placed along the articular cartilage margin for the medial row repair.  The anchor seated well and got good purchase in the bone.    Using standard suture shuttling technique and a self retrieving scorpion device, the sutures were sequentially shuttled through the rotator cuff tissue from anterior to posterior.  Once I had completed suture passage, the sutures were sequentially tied from posterior to anterior using 6 throw surgeon's knots with reverse half hitches on alternating posts.  I got an excellent repair along the medial row.  There was sufficient good quality tissue to allow for placement of lateral row anchors.  Sutures from the anchors were brought out through the lateral portal and passed through a 4.75 mm swivel lock anchor.  This anchor was punched and placed along the posterior aspect of the tuberosity.  The anchor seated well and got excellent purchase.  The excess sutures were cut and removed.  The final sutures were brought out through  the lateral portal and passed through a second swivel lock anchor.  This anchor was punched and placed a little more anteriorly on the tuberosity to complete the double row construct.  Again, this anchor seated well and got excellent purchase.  The excess sutures were cut and removed.  This created what seemed to be an anatomic repair of the rotator cuff footprint.  There were no loose ends or dog-ears.  The tissue seemed to be well fixed without excessive tension.  Final images were taken and saved.      Next, I directed my attention to the distal clavicle excision.  The acromioclavicular joint was exposed.  There was extensive arthropathy and spurring off of the inferior portion of the clavicle.  The Arthrex Apollo device was inserted through the anterior portal and used to release the inferior capsule.  The distal clavicle was exposed.  I removed approximately 7-9 mm of bone from the distal clavicle under direct visualization.  Once I had completed the distal clavicle excision, I confirmed that there were no amputated fragments or remaining areas that warranted debridement by inserting the scope in both the lateral and anterior portals to visualize directly into the acromioclavicular joint.  I confirmed that this space was completely decompressed.  Final images were taken and saved.    The wounds were copiously irrigated out with sterile saline and closed in a layered fashion using Monocryl for the deep tissues and nylon for the skin.  Sterile dressings were applied.  The drapes were withdrawn.  The arm was placed in an immobilizer.  The patient was awakened and taken to the recovery room in good condition.      Cresencio Christina MD  11/12/19

## 2019-11-13 ENCOUNTER — TELEPHONE (OUTPATIENT)
Dept: ORTHOPEDIC SURGERY | Facility: CLINIC | Age: 74
End: 2019-11-13

## 2019-11-13 NOTE — TELEPHONE ENCOUNTER
"Called patient post op and she is doing ok. She says the \"spot on her arm that was swollen yesterday itches\". Patient has f/u appt scheduled on 11/18 at EP office.  "

## 2019-11-18 ENCOUNTER — OFFICE VISIT (OUTPATIENT)
Dept: ORTHOPEDIC SURGERY | Facility: CLINIC | Age: 74
End: 2019-11-18

## 2019-11-18 VITALS — BODY MASS INDEX: 29.83 KG/M2 | WEIGHT: 158 LBS | HEIGHT: 61 IN | TEMPERATURE: 97.5 F

## 2019-11-18 DIAGNOSIS — Z98.890 S/P LEFT ROTATOR CUFF REPAIR: ICD-10-CM

## 2019-11-18 DIAGNOSIS — M75.100 NONTRAUMATIC TEAR OF ROTATOR CUFF, UNSPECIFIED LATERALITY, UNSPECIFIED TEAR EXTENT: Primary | ICD-10-CM

## 2019-11-18 PROCEDURE — 99024 POSTOP FOLLOW-UP VISIT: CPT | Performed by: ORTHOPAEDIC SURGERY

## 2019-11-18 RX ORDER — HYDROCODONE BITARTRATE AND ACETAMINOPHEN 7.5; 325 MG/1; MG/1
1 TABLET ORAL EVERY 4 HOURS PRN
Qty: 50 TABLET | Refills: 0 | Status: SHIPPED | OUTPATIENT
Start: 2019-11-18 | End: 2020-09-01

## 2019-11-18 NOTE — PROGRESS NOTES
Samira Workman : 1945 MRN: [id number] DATE: 2019    CC: 1 week s/p left shoulder rotator cuff repair with DCE    HPI:  Pt. returns to clinic today for follow up.  Reports pain is well controlled.  Denies fevers, drainage, redness or other concerning symptoms.  Reports compliance with use of the sling.    Vitals:    19 1019   Temp: 97.5 °F (36.4 °C)       Exam:  Wounds appear well-approximated.  Arm and forearm soft.  Shoulder moves fluidly with pendulums.  Good motor and sensory function distally.  Palpable pulses with good cap refill.      Imaging   none    Impression:  1 week s/p left shoulder rotator cuff repair with DCE    Plan:    1.  Begin PT per protocol--prescription given as well as 2 copies of my protocol.  2.  Continue shoulder immobilizer.  3.  Follow up in 5 weeks at which time we'll discontinue the sling and progress to full motion.  4.  Counseled the patient about appropriate activity modifications and restrictions, including no driving at this point.    Cresencio Christina MD

## 2019-12-17 ENCOUNTER — ANTICOAGULATION VISIT (OUTPATIENT)
Dept: FAMILY MEDICINE CLINIC | Facility: CLINIC | Age: 74
End: 2019-12-17

## 2019-12-17 LAB — INR PPP: 2.7 (ref 0.9–1.1)

## 2019-12-17 PROCEDURE — 85610 PROTHROMBIN TIME: CPT | Performed by: FAMILY MEDICINE

## 2019-12-17 PROCEDURE — 36416 COLLJ CAPILLARY BLOOD SPEC: CPT | Performed by: FAMILY MEDICINE

## 2019-12-17 NOTE — PATIENT INSTRUCTIONS
12/17/2019 INR 2.7 Per Dr Leiva take 5 mg 4 days a week and 2.5mg 3 days a week recheck in 6 weeks.

## 2019-12-20 ENCOUNTER — OFFICE VISIT (OUTPATIENT)
Dept: ORTHOPEDIC SURGERY | Facility: CLINIC | Age: 74
End: 2019-12-20

## 2019-12-20 VITALS — TEMPERATURE: 97.9 F | WEIGHT: 158 LBS | HEIGHT: 60 IN | BODY MASS INDEX: 31.02 KG/M2

## 2019-12-20 DIAGNOSIS — Z09 SURGERY FOLLOW-UP: Primary | ICD-10-CM

## 2019-12-20 PROCEDURE — 99024 POSTOP FOLLOW-UP VISIT: CPT | Performed by: ORTHOPAEDIC SURGERY

## 2019-12-20 NOTE — PROGRESS NOTES
Samira Workman : 1945 MRN: [id number] DATE: 2019    CC: 6 weeks s/p left shoulder rotator cuff repair with DCE    HPI: Pt. returns to clinic today for follow up.  Reports pain is well controlled.  Reports compliance with the physical therapy but not the sling.  She does not have that with her today and she admits that she has not been wearing it as much.  Denies any new issues or problems.    Vitals:    19 0838   Temp: 97.9 °F (36.6 °C)       Exam:   Wounds appear well-healed.  Arm and forearm soft.  Shoulder moves fluidly and motion is on track per protocol.  Good motor and sensory function distally.  Palpable radial pulse with good cap refill.      Impression:  6 weeks s/p left shoulder rotator cuff repair with DCE    Plan:    1.  Continue PT per protocol.  We discussed the importance of compliance to optimize her chances of healing.  2.  Discontinue shoulder immobilizer and begin working on progressive ROM per protocol.  3.  Follow up in 6 weeks at which time we'll begin to progress strengthening.  4.  Counseled the patient about appropriate activity modifications and restrictions--released to drive at this point.    Cresencio Christina MD

## 2020-01-28 ENCOUNTER — ANTICOAGULATION VISIT (OUTPATIENT)
Dept: FAMILY MEDICINE CLINIC | Facility: CLINIC | Age: 75
End: 2020-01-28

## 2020-01-28 DIAGNOSIS — I48.0 PAROXYSMAL ATRIAL FIBRILLATION (HCC): Primary | ICD-10-CM

## 2020-01-28 LAB — INR PPP: 2.1 (ref 0.9–1.1)

## 2020-01-28 PROCEDURE — 85610 PROTHROMBIN TIME: CPT | Performed by: FAMILY MEDICINE

## 2020-01-28 PROCEDURE — 36416 COLLJ CAPILLARY BLOOD SPEC: CPT | Performed by: FAMILY MEDICINE

## 2020-02-03 ENCOUNTER — OFFICE VISIT (OUTPATIENT)
Dept: ORTHOPEDIC SURGERY | Facility: CLINIC | Age: 75
End: 2020-02-03

## 2020-02-03 VITALS — TEMPERATURE: 97.7 F | WEIGHT: 159.2 LBS | HEIGHT: 62 IN | BODY MASS INDEX: 29.3 KG/M2

## 2020-02-03 DIAGNOSIS — Z09 SURGERY FOLLOW-UP: Primary | ICD-10-CM

## 2020-02-03 PROCEDURE — 99024 POSTOP FOLLOW-UP VISIT: CPT | Performed by: NURSE PRACTITIONER

## 2020-02-03 NOTE — PROGRESS NOTES
Samira Workman : 1945 MRN: [id number] DATE: 2/3/2020    CC: 12 weeks s/p left shoulder rotator cuff repair with DCE    HPI:  Pt. returns to clinic today for follow up.  Reports she continues to have pain and tells me she does not understand why this recovery has been so different from her right shoulder.  She is curious if there can be a problem with the repair.  Also, she reports she stopped going to formal physical therapy 2-3 weeks ago.  She has some therapy bands at home, but has not been using them.  Denies any new issues or problems.    Vitals:    20 1112   Temp: 97.7 °F (36.5 °C)     Exam:  Wounds are healed.  Arm and forearm soft.  Shoulder motion is near full, lacking a few degrees of internal rotation.  Good motor and sensory function distally.  Palpable radial pulse with good cap refill.      Impression:  12 weeks s/p left shoulder rotator cuff repair with DCE    Plan:  We had a lengthy discussion regarding her complaint of pain.  We discussed the repair in detail and reviewed the surgery photos.  I explained there is a possibility her repair has failed, however, it is too soon to repeat a MRI for further evaluation.  I find it more likely that she continues to have pain due to lack of conditioning.  I recommended she return to physical therapy for strength training.  I encouraged her to be very diligent with her home exercises.  Also, I encouraged her to move and use her arm as normally as possible.  She acknowledged understanding and agreed to give physical therapy and home exercises her best effort.  She will follow-up in 2 months with Dr. Christina for re-evaluation and recommendations.    Tsering Ennis, APRN     2020

## 2020-02-07 RX ORDER — CITALOPRAM 20 MG/1
TABLET ORAL
Qty: 90 TABLET | Refills: 3 | Status: SHIPPED | OUTPATIENT
Start: 2020-02-07 | End: 2020-02-25

## 2020-02-07 RX ORDER — PRAVASTATIN SODIUM 40 MG
TABLET ORAL
Qty: 90 TABLET | Refills: 0 | Status: SHIPPED | OUTPATIENT
Start: 2020-02-07 | End: 2020-05-06

## 2020-02-18 RX ORDER — TRAMADOL HYDROCHLORIDE 50 MG/1
TABLET ORAL
Qty: 60 TABLET | OUTPATIENT
Start: 2020-02-18

## 2020-02-19 ENCOUNTER — TELEPHONE (OUTPATIENT)
Dept: FAMILY MEDICINE CLINIC | Facility: CLINIC | Age: 75
End: 2020-02-19

## 2020-02-19 NOTE — TELEPHONE ENCOUNTER
Pt takes tramadol 50mg twice daily for neuropathy and back pain. She is requesting a new script for Tramadol. If authorized please send to [facility].

## 2020-02-20 NOTE — TELEPHONE ENCOUNTER
Pt says that she had previously informed Dr. So. I informed pt that it is not a medication currently on her med list and she will need an appointment prior to a controlled substance script

## 2020-02-20 NOTE — TELEPHONE ENCOUNTER
Tramadol is a pain medication that is a controlled substance, it is not something I have prescribed for her in the past nor was I aware she was on it. I advise her to reach out to who was prescribing it before for a refill. If this person is no longer available she is welcome to make an appointment where we can discuss it further.

## 2020-02-25 ENCOUNTER — OFFICE VISIT (OUTPATIENT)
Dept: FAMILY MEDICINE CLINIC | Facility: CLINIC | Age: 75
End: 2020-02-25

## 2020-02-25 VITALS
BODY MASS INDEX: 31.02 KG/M2 | SYSTOLIC BLOOD PRESSURE: 118 MMHG | DIASTOLIC BLOOD PRESSURE: 74 MMHG | HEART RATE: 74 BPM | HEIGHT: 60 IN | OXYGEN SATURATION: 99 % | WEIGHT: 158 LBS

## 2020-02-25 DIAGNOSIS — G62.9 NEUROPATHY: Primary | ICD-10-CM

## 2020-02-25 DIAGNOSIS — Z79.899 HIGH RISK MEDICATION USE: ICD-10-CM

## 2020-02-25 DIAGNOSIS — Z79.01 ANTICOAGULANT LONG-TERM USE: ICD-10-CM

## 2020-02-25 LAB — INR PPP: 1.9 (ref 0.9–1.1)

## 2020-02-25 PROCEDURE — 36416 COLLJ CAPILLARY BLOOD SPEC: CPT | Performed by: FAMILY MEDICINE

## 2020-02-25 PROCEDURE — 99213 OFFICE O/P EST LOW 20 MIN: CPT | Performed by: FAMILY MEDICINE

## 2020-02-25 PROCEDURE — 85610 PROTHROMBIN TIME: CPT | Performed by: FAMILY MEDICINE

## 2020-02-25 RX ORDER — TRAMADOL HYDROCHLORIDE 50 MG/1
50 TABLET ORAL 2 TIMES DAILY
COMMUNITY
End: 2020-02-25 | Stop reason: SDUPTHER

## 2020-02-25 RX ORDER — TRAMADOL HYDROCHLORIDE 50 MG/1
50 TABLET ORAL 2 TIMES DAILY PRN
Qty: 60 TABLET | Refills: 2 | Status: SHIPPED | OUTPATIENT
Start: 2020-02-25 | End: 2020-09-01 | Stop reason: SDUPTHER

## 2020-02-25 NOTE — PROGRESS NOTES
"    Samira Workman is a 75 y.o. female.     Chief Complaint   Patient presents with   • Pain     pt is requesting tramadol, pt says that she has been taking it for neuropathy for ~30 years        HPI     Pt is a pleasant 75 y.o. YO female here to discuss chronic pain medication - tramadol - that she takes for neuropathy. Also for INR check.     Neuropathy - patient was diagnosed with neuropathy about 30 years ago, she has been on tramadol for symptom relief the last 20 years. She has been trialed on agents more specifically for neuropathy such as gabapentin but had significant side effects. Her pain is primarily in her right foot, describes it as feeling like a \"vice \" is on her foot, at other times like someone is cutting her foot down the middle. She has been on tramadol for many years without issue of side effects, has not required dosage increase or needed to take it more frequently. She typically takes it twice a day, occasionally will take a third dose when her lower back is bothering her more, takes tylenol with it as well.     INR - patient is here for INR check, takes coumadin daily a-fib, last INR was done a month ago and was at goal at 2.1.     The following portions of the patient's history were reviewed by me and updated as appropriate: allergies, current medications, past family history, past medical history, past social history, past surgical history and problem list.      Review of Systems   Constitutional: Negative.    HENT: Negative.    Eyes: Negative.    Respiratory: Negative.    Cardiovascular: Negative.  Negative for chest pain, palpitations and leg swelling.   Gastrointestinal: Negative.    Endocrine: Negative.    Genitourinary: Negative.    Musculoskeletal: Positive for arthralgias and gait problem.   Skin: Negative.    Allergic/Immunologic: Negative.    Neurological: Positive for numbness.   Hematological: Negative.    Psychiatric/Behavioral: Positive for sleep disturbance.     I have reviewed " and agree with the ROS as documented by my medical assistant - Anne So MD    Objective  Vitals:    02/25/20 1345   BP: 118/74   Pulse: 74   SpO2: 99%     Body mass index is 30.86 kg/m².       Physical Exam   Constitutional: She is oriented to person, place, and time. She appears well-developed and well-nourished. No distress.   HENT:   Head: Normocephalic and atraumatic.   Nose: Nose normal.   Eyes: Conjunctivae are normal. Right eye exhibits no discharge. Left eye exhibits no discharge. No scleral icterus.   Pulmonary/Chest: Effort normal. No respiratory distress.   Neurological: She is alert and oriented to person, place, and time.   Skin: No erythema. No pallor.   Psychiatric: She has a normal mood and affect. Her behavior is normal. Judgment and thought content normal.   Vitals reviewed.        Current Outpatient Medications:   •  benazepril (LOTENSIN) 10 MG tablet, Take 10 mg by mouth Every Morning., Disp: , Rfl:   •  Calcium Carb-Cholecalciferol (CALCIUM 1000 + D PO), Take 2 tablets by mouth Daily., Disp: , Rfl:   •  docusate sodium (COLACE) 100 MG capsule, Take 1 capsule by mouth 2 (Two) Times a Day., Disp: 60 capsule, Rfl: 0  •  HYDROcodone-acetaminophen (NORCO) 7.5-325 MG per tablet, Take 1 tablet by mouth Every 4 (Four) Hours As Needed for Moderate Pain ., Disp: 50 tablet, Rfl: 0  •  metFORMIN (GLUCOPHAGE) 500 MG tablet, TAKE 1 TABLET BY MOUTH EVERY DAY, Disp: 90 tablet, Rfl: 3  •  Multiple Vitamin (MULTI VITAMIN DAILY PO), Take 1 tablet by mouth Daily., Disp: , Rfl:   •  pravastatin (PRAVACHOL) 40 MG tablet, TAKE 1 TABLET BY MOUTH DAILY, Disp: 90 tablet, Rfl: 0  •  TURMERIC PO, Take 1 tablet by mouth Daily., Disp: , Rfl:   •  warfarin (COUMADIN) 2.5 MG tablet, Take 2.5 mg by mouth 3 (Three) Times a Week. M,W,F, Disp: , Rfl:   •  warfarin (COUMADIN) 5 MG tablet, Take 5 mg by mouth 4 (Four) Times a Week. T,TH,SAT,SUN, Disp: , Rfl:   •  traMADol (ULTRAM) 50 MG tablet, Take 50 mg by mouth 2 (Two)  Times a Day., Disp: , Rfl:     Procedures    Lab Results (most recent)     None              Samira was seen today for pain.    Diagnoses and all orders for this visit:    Neuropathy/   High risk medication use  Patient with chronic neuropathy of the right foot that significantly interferes with her sleep.  She has had evaluation done including nerve conduction studies and was told that neuropathy may originate in the lower back.  She also has chronic lower back pain.  She has been on therapy with tramadol for the last 20+ years and it continues to control her pain well.  We discussed risks of medication including potential for tolerance and addiction as well as possible drowsiness, respiratory depression, or coma. Patient is understanding of the medication risks and wishes to proceed. Will refill medication for patient at this time, we reviewed controlled substance agreement together and signed it.  Noe was also obtained and reviewed by myself and is appropriate.  Discussed with her that she will need to be seen in the office at least every 3 months to continue with refills.    Anticoagulant long-term use  INR today slightly below goal at 1.9, patient does state that she has been eating more salads lately.  Since she has been stable for so long we will not adjust medication dosage at this time but repeat in a couple weeks.  If remains below goal at that time will adjust medication dose.  -     POC Protime / INR            Return in about 3 months (around 5/25/2020) for Recheck neuropathy - follow up in 4 weeks recheck INR.      Anne Lieva MD

## 2020-03-24 ENCOUNTER — ANTICOAGULATION VISIT (OUTPATIENT)
Dept: FAMILY MEDICINE CLINIC | Facility: CLINIC | Age: 75
End: 2020-03-24

## 2020-03-24 DIAGNOSIS — Z79.01 ANTICOAGULANT LONG-TERM USE: Primary | ICD-10-CM

## 2020-03-24 LAB — INR PPP: 2.2 (ref 0.9–1.1)

## 2020-03-24 PROCEDURE — 85610 PROTHROMBIN TIME: CPT | Performed by: FAMILY MEDICINE

## 2020-03-24 PROCEDURE — 93793 ANTICOAG MGMT PT WARFARIN: CPT | Performed by: FAMILY MEDICINE

## 2020-03-31 ENCOUNTER — TELEPHONE (OUTPATIENT)
Dept: FAMILY MEDICINE CLINIC | Facility: CLINIC | Age: 75
End: 2020-03-31

## 2020-03-31 RX ORDER — BENAZEPRIL HYDROCHLORIDE 10 MG/1
TABLET ORAL
Qty: 90 TABLET | Refills: 3 | Status: SHIPPED | OUTPATIENT
Start: 2020-03-31 | End: 2021-03-25

## 2020-04-07 ENCOUNTER — ANTICOAGULATION VISIT (OUTPATIENT)
Dept: FAMILY MEDICINE CLINIC | Facility: CLINIC | Age: 75
End: 2020-04-07

## 2020-04-07 DIAGNOSIS — I48.0 PAROXYSMAL ATRIAL FIBRILLATION (HCC): ICD-10-CM

## 2020-04-07 DIAGNOSIS — Z79.01 ANTICOAGULANT LONG-TERM USE: Primary | ICD-10-CM

## 2020-04-07 LAB — INR PPP: 2.5 (ref 0.9–1.1)

## 2020-04-07 PROCEDURE — 85610 PROTHROMBIN TIME: CPT | Performed by: FAMILY MEDICINE

## 2020-04-07 PROCEDURE — 36416 COLLJ CAPILLARY BLOOD SPEC: CPT | Performed by: FAMILY MEDICINE

## 2020-04-07 PROCEDURE — 93793 ANTICOAG MGMT PT WARFARIN: CPT | Performed by: FAMILY MEDICINE

## 2020-05-06 RX ORDER — PRAVASTATIN SODIUM 40 MG
TABLET ORAL
Qty: 90 TABLET | Refills: 0 | Status: SHIPPED | OUTPATIENT
Start: 2020-05-06 | End: 2020-08-10

## 2020-05-07 ENCOUNTER — TELEPHONE (OUTPATIENT)
Dept: ORTHOPEDIC SURGERY | Facility: CLINIC | Age: 75
End: 2020-05-07

## 2020-05-07 NOTE — TELEPHONE ENCOUNTER
Left message (home) for patient to call Annalisa WOOD / RICKY back to reschedule 5/13 appt with [facility] originally at [place] [facility] at [facility] (ok to use KULDIP / Video Visit if needed). Thanks /srh

## 2020-05-08 NOTE — TELEPHONE ENCOUNTER
Spoke to patient & rescheduled her to see [facility] at [facility] Wed 5/13 @1:30 for 2 month f/u left shoulder. Patient informed of registration info to bring as well as check-in number to call upon arrival. Patient verbalized understanding.

## 2020-05-13 ENCOUNTER — OFFICE VISIT (OUTPATIENT)
Dept: ORTHOPEDIC SURGERY | Facility: CLINIC | Age: 75
End: 2020-05-13

## 2020-05-13 VITALS — HEIGHT: 60 IN | TEMPERATURE: 97.5 F | BODY MASS INDEX: 31.41 KG/M2 | WEIGHT: 160 LBS

## 2020-05-13 DIAGNOSIS — Z98.890 S/P LEFT ROTATOR CUFF REPAIR: Primary | ICD-10-CM

## 2020-05-13 PROCEDURE — 99212 OFFICE O/P EST SF 10 MIN: CPT | Performed by: NURSE PRACTITIONER

## 2020-05-13 RX ORDER — CITALOPRAM 20 MG/1
TABLET ORAL DAILY
COMMUNITY
Start: 2020-05-06 | End: 2021-02-15

## 2020-05-13 NOTE — PROGRESS NOTES
Samira Workman : 1945 MRN: [id number] DATE: 2020     CC: 6 months s/p left shoulder rotator cuff repair with DCE    HPI: Patient returns to clinic today for follow up.  Reports she continues to have moderate, constant, aching pain localized to the deltoid region.  Reports in total she had 15 weeks of physical therapy and met her goals in regards to motion, but had increased pain after each therapy session.  She has not been doing home exercises as learned in physical therapy.  She tells me she is unable to lift a gallon of milk due to pain and weakness.  Also, she is unable to lie on her left side.  She feels frustrated with her recovery in comparison to her right shoulder surgery, which fully recovered and is pain-free.    Vitals:    20 1332   Temp: 97.5 °F (36.4 °C)     Exam:    General:  Awake and alert.  No acute distress.    Extremities:  Wounds are healed.  Motion is -5° shy of full compared to contralateral side.  Strength is 3/5 with resistive testing of rotator cuff.   Slightly positive Neer and Tapia maneuver.  Good motor and sensory function distally.  Palpable radial pulse with good cap refill.      Imaging   none    Impression:  6 months s/p left shoulder rotator cuff repair with DCE    Plan:    I explained that she appears weak during the exam.  I encouraged her to continue home exercises with bands every other day.  I explained I will discuss her concerns and physical exam with Dr. Christina and ask for further recommendations.  She acknowledged understanding and agreed to try the home exercises.    Tsering Ennis, APRN    2020

## 2020-05-15 DIAGNOSIS — Z98.890 S/P LEFT ROTATOR CUFF REPAIR: Primary | ICD-10-CM

## 2020-05-15 RX ORDER — TRAMADOL HYDROCHLORIDE 50 MG/1
50 TABLET ORAL EVERY 4 HOURS PRN
Qty: 60 TABLET | Refills: 0 | Status: SHIPPED | OUTPATIENT
Start: 2020-05-15 | End: 2020-09-01 | Stop reason: SDUPTHER

## 2020-05-18 ENCOUNTER — TELEPHONE (OUTPATIENT)
Dept: ORTHOPEDIC SURGERY | Facility: CLINIC | Age: 75
End: 2020-05-18

## 2020-05-18 DIAGNOSIS — Z91.041 CONTRAST MEDIA ALLERGY: ICD-10-CM

## 2020-05-18 DIAGNOSIS — Z91.041 H/O ALLERGY TO RADIOGRAPHIC CONTRAST MEDIA: Primary | ICD-10-CM

## 2020-05-18 RX ORDER — PREDNISONE 50 MG/1
TABLET ORAL
Qty: 3 TABLET | Refills: 0 | Status: SHIPPED | OUTPATIENT
Start: 2020-05-18 | End: 2020-09-01

## 2020-05-18 RX ORDER — DIPHENHYDRAMINE HCL 50 MG
CAPSULE ORAL
Qty: 1 CAPSULE | Refills: 0 | Status: SHIPPED | OUTPATIENT
Start: 2020-05-18 | End: 2021-01-06 | Stop reason: HOSPADM

## 2020-05-26 ENCOUNTER — OFFICE VISIT (OUTPATIENT)
Dept: FAMILY MEDICINE CLINIC | Facility: CLINIC | Age: 75
End: 2020-05-26

## 2020-05-26 VITALS
TEMPERATURE: 97.1 F | SYSTOLIC BLOOD PRESSURE: 126 MMHG | HEART RATE: 65 BPM | HEIGHT: 60 IN | BODY MASS INDEX: 31.41 KG/M2 | WEIGHT: 160 LBS | OXYGEN SATURATION: 96 % | DIASTOLIC BLOOD PRESSURE: 74 MMHG

## 2020-05-26 DIAGNOSIS — M79.672 BILATERAL FOOT PAIN: ICD-10-CM

## 2020-05-26 DIAGNOSIS — M79.671 BILATERAL FOOT PAIN: ICD-10-CM

## 2020-05-26 DIAGNOSIS — G62.9 NEUROPATHY: Primary | ICD-10-CM

## 2020-05-26 DIAGNOSIS — E11.8 CONTROLLED TYPE 2 DIABETES MELLITUS WITH COMPLICATION, WITHOUT LONG-TERM CURRENT USE OF INSULIN (HCC): ICD-10-CM

## 2020-05-26 DIAGNOSIS — I48.0 PAROXYSMAL ATRIAL FIBRILLATION (HCC): ICD-10-CM

## 2020-05-26 DIAGNOSIS — Z79.01 ANTICOAGULATED: ICD-10-CM

## 2020-05-26 LAB
HBA1C MFR BLD: 6.2 %
INR PPP: 1.4 (ref 0.9–1.1)
PROTHROMBIN TIME: 17.1 SECONDS

## 2020-05-26 PROCEDURE — 83036 HEMOGLOBIN GLYCOSYLATED A1C: CPT | Performed by: FAMILY MEDICINE

## 2020-05-26 PROCEDURE — 99214 OFFICE O/P EST MOD 30 MIN: CPT | Performed by: FAMILY MEDICINE

## 2020-05-26 PROCEDURE — 85610 PROTHROMBIN TIME: CPT | Performed by: FAMILY MEDICINE

## 2020-05-26 PROCEDURE — 36416 COLLJ CAPILLARY BLOOD SPEC: CPT | Performed by: FAMILY MEDICINE

## 2020-05-26 RX ORDER — LIDOCAINE 50 MG/G
1 PATCH TOPICAL EVERY 24 HOURS
Qty: 30 PATCH | Refills: 1 | Status: SHIPPED | OUTPATIENT
Start: 2020-05-26 | End: 2021-08-19

## 2020-05-26 NOTE — PROGRESS NOTES
"    Samira Workman is a 75 y.o. female.     Chief Complaint   Patient presents with   • Peripheral Neuropathy     3 month follow up        HPI     Pt is a pleasant 75 y.o. YO female here for follow up of peripheral neuropathy and A1C and INR check for diabetes and anticoagulation respectively.     Peripheral Neuropathy - chronic, stable. Moderately controlled with tramadol for pain. Patient has trialed both gabapentin and pregabalin but had severe dry mouth with both and felt \"loopy\". The tramadol \"takes the edge off\" but does not completely control symptoms. Right foot > left. She has also started to have tingling sensation in her fingertips. She saw podiatry recently and requested inserts but they gave her form that needed filled out by PCP - she does not have that with her today.  She has used topical Aspercreme on her feet which is helped some.  She is interested in possibly using a lidocaine patch on the area to trial.    Type 2 Diabetes - chronic, stable. Takes metformin 500 mg once daily. Foot exam performed today.  Diabetic eye exam performed a couple months ago at [facility] - Nov/Dec 2019-was reportedly normal.    Anticoagulation -  Chronic, on anticoagulation with coumadin for atrial fibrillation. Last INR was over a month ago and was 2.5.  She is currently taking Coumadin 5 mg 5 days of the week and 2.5 mg 2 days of the week.    The following portions of the patient's history were reviewed by me and updated as appropriate: allergies, current medications, past family history, past medical history, past social history, past surgical history, and problem list.     Review of Systems   Constitutional: Negative.    HENT: Negative.    Eyes: Negative.    Respiratory: Negative.    Cardiovascular: Negative for chest pain and leg swelling.   Gastrointestinal: Negative.    Endocrine: Negative.    Genitourinary: Negative.    Musculoskeletal: Positive for arthralgias and myalgias.   Allergic/Immunologic: Negative.  "   Neurological: Positive for numbness.   Hematological: Negative.    Psychiatric/Behavioral: Negative.      I have reviewed and confirmed the accuracy of the ROS as documented by the MA/LPN/RN Anne Leiva MD    Objective  Vitals:    05/26/20 1247   BP: 126/74   Pulse: 65   Temp: 97.1 °F (36.2 °C)   SpO2: 96%     Body mass index is 31.25 kg/m².       Physical Exam   Constitutional: She is oriented to person, place, and time. She appears well-developed and well-nourished. No distress.   HENT:   Head: Normocephalic and atraumatic.   Nose: Nose normal.   Eyes: Conjunctivae are normal. Right eye exhibits no discharge. Left eye exhibits no discharge. No scleral icterus.   Pulmonary/Chest: Effort normal. No respiratory distress.    Samira had a diabetic foot exam performed (2+ pedal pulses) today.   During the foot exam she had a monofilament test performed (positive for sensation in all areas but diminished on right compared to left. ).  Vascular Status -  Her right foot exhibits normal foot vasculature  and no edema. Her left foot exhibits normal foot vasculature  and no edema.  Skin Integrity  -  Her right foot skin is intact.Her left foot skin is intact..     Neurological: She is alert and oriented to person, place, and time.   Skin: No erythema. No pallor.   Psychiatric: She has a normal mood and affect. Her behavior is normal. Judgment and thought content normal.   Vitals reviewed.        Current Outpatient Medications:   •  benazepril (LOTENSIN) 10 MG tablet, TAKE 1 TABLET BY MOUTH DAILY, Disp: 90 tablet, Rfl: 3  •  Calcium Carb-Cholecalciferol (CALCIUM 1000 + D PO), Take 2 tablets by mouth Daily., Disp: , Rfl:   •  citalopram (CeleXA) 20 MG tablet, Take  by mouth Daily., Disp: , Rfl:   •  diphenhydrAMINE (BENADRYL) 50 MG capsule, Take 1 capsule 1 hour prior to test time by mouth., Disp: 1 capsule, Rfl: 0  •  docusate sodium (COLACE) 100 MG capsule, Take 1 capsule by mouth 2 (Two) Times a Day., Disp: 60  capsule, Rfl: 0  •  HYDROcodone-acetaminophen (NORCO) 7.5-325 MG per tablet, Take 1 tablet by mouth Every 4 (Four) Hours As Needed for Moderate Pain ., Disp: 50 tablet, Rfl: 0  •  metFORMIN (GLUCOPHAGE) 500 MG tablet, TAKE 1 TABLET BY MOUTH EVERY DAY, Disp: 90 tablet, Rfl: 3  •  Multiple Vitamin (MULTI VITAMIN DAILY PO), Take 1 tablet by mouth Daily., Disp: , Rfl:   •  pravastatin (PRAVACHOL) 40 MG tablet, TAKE 1 TABLET BY MOUTH DAILY, Disp: 90 tablet, Rfl: 0  •  traMADol (ULTRAM) 50 MG tablet, Take 1 tablet by mouth 2 (Two) Times a Day As Needed for Moderate Pain ., Disp: 60 tablet, Rfl: 2  •  traMADol (ULTRAM) 50 MG tablet, Take 1 tablet by mouth Every 4 (Four) Hours As Needed for Moderate Pain ., Disp: 60 tablet, Rfl: 0  •  TURMERIC PO, Take 1 tablet by mouth Daily., Disp: , Rfl:   •  warfarin (COUMADIN) 2.5 MG tablet, Take 2.5 mg by mouth 3 (Three) Times a Week. M,W,F, Disp: , Rfl:   •  warfarin (COUMADIN) 5 MG tablet, Take 5 mg by mouth 4 (Four) Times a Week. T,TH,SAT,SUN, Disp: , Rfl:   •  lidocaine (LIDODERM) 5 %, Place 1 patch on the skin as directed by provider Daily. Remove & Discard patch within 12 hours or as directed by MD, Disp: 30 patch, Rfl: 1  •  predniSONE (DELTASONE) 50 MG tablet, Take 1 tablet 13 hours, 1 tablet 7 hours, and 1 tablet 1 hour prior to test time (3 doses total) by mouth., Disp: 3 tablet, Rfl: 0    Procedures    Lab Results (most recent)     None              Samira was seen today for peripheral neuropathy.    Diagnoses and all orders for this visit:    Neuropathy  Bilateral foot pain  Patient with bilateral peripheral neuropathy of the feet as well as chronic foot pain.  Currently taking tramadol for pain control, stable on this.  In addition to this we will also try lidocaine patch which I have sent prescription in for.  Patient has failed topical Voltaren as well as prescription compound medications for pain relief.  She is also failed oral gabapentin and pregabalin.  Instructed her  to call medical supplier for form needed for inserts, she can have them fax it here and I will fill it out.  -     lidocaine (LIDODERM) 5 %; Place 1 patch on the skin as directed by provider Daily. Remove & Discard patch within 12 hours or as directed by MD    Controlled type 2 diabetes mellitus with complication, without long-term current use of insulin (CMS/[facility])  Chronic, currently well controlled with a hemoglobin A1c today of 6.1.  Continue metformin 500 mg once daily.  Up-to-date on foot and eye exam.  -     POC Glycosylated Hemoglobin (Hb A1C)    Anticoagulated, Paroxysmal atrial fibrillation (CMS/[facility])  -     POC Protime / INR  Patient presents for monitoring of INR while on Coumadin anticoagulation for paroxysmal atrial fibrillation.  Her INR today is well below goal at 1.4.  Her goal INR is to be between 2 and 3.  Instructed patient to substitute 1 of for 2.5 mg dose days for 5 mg.  This will have her taking 5 mg daily 6 days of the week and 2.5 mg 1 day of the week.  Patient is going to return in 2 weeks for follow-up INR.    Return in about 3 months (around 8/26/2020) for Med recheck.      Anne Leiva MD

## 2020-05-26 NOTE — TELEPHONE ENCOUNTER
Peggy with [facility] called stating patient told her patient has not received any Prednisone yet prior to LEFT Shldr Arthrogram scheduled for tomorrow Wed 5/27 at 1330 PM.     Previous message from 5/18 stated patient needs to take Prednisone 50 MG one tablet 13 hours prior to Arthrogram, a second 50 MG seven hours prior and the final 50 MG tablet one hour prior along with 50 MG of Benadryl one hour prior. Patient informed Peggy that patient has two 25 MG of Wal-dryl so can take those. Previous 5/18 message noted some medications but were not sent.     Patient uses [facility] #[id number] ph: [phone]. Peggy can be reached at [phone] & asked that patient be notified when medications sent to pharmacy. Patient can be reached at [phone]. Thanks /srh

## 2020-05-26 NOTE — TELEPHONE ENCOUNTER
Have left a message with [facility] radiology to contact me at the office.  I have also checked with the pharmacy.  Patient did  the prescriptions on May 20.  I called the patient she denied that she had the prescription however admits she just threw all of her prescriptions in a drawer.  After going back and looking through the drawer she did find both the prescription for Benadryl and the prednisone.  Have advised her that she does need to follow the directions on both prescriptions.  Especially since the prednisone has to be taken beginning 13 hours before the procedure.

## 2020-05-27 ENCOUNTER — HOSPITAL ENCOUNTER (OUTPATIENT)
Dept: GENERAL RADIOLOGY | Facility: HOSPITAL | Age: 75
Discharge: HOME | End: 2020-05-27
Admitting: ORTHOPAEDIC SURGERY

## 2020-05-27 ENCOUNTER — HOSPITAL ENCOUNTER (OUTPATIENT)
Dept: MRI IMAGING | Facility: HOSPITAL | Age: 75
Discharge: HOME | End: 2020-05-27

## 2020-05-27 DIAGNOSIS — Z98.890 S/P LEFT ROTATOR CUFF REPAIR: ICD-10-CM

## 2020-05-27 PROCEDURE — 0 GADOBENATE DIMEGLUMINE 529 MG/ML SOLUTION: Performed by: RADIOLOGY

## 2020-05-27 PROCEDURE — 77002 NEEDLE LOCALIZATION BY XRAY: CPT

## 2020-05-27 PROCEDURE — A9577 INJ MULTIHANCE: HCPCS | Performed by: RADIOLOGY

## 2020-05-27 PROCEDURE — 73222 MRI JOINT UPR EXTREM W/DYE: CPT

## 2020-05-27 PROCEDURE — 25010000003 LIDOCAINE 1 % SOLUTION: Performed by: RADIOLOGY

## 2020-05-27 RX ORDER — LIDOCAINE HYDROCHLORIDE 10 MG/ML
10 INJECTION, SOLUTION INFILTRATION; PERINEURAL ONCE
Status: COMPLETED | OUTPATIENT
Start: 2020-05-27 | End: 2020-05-27

## 2020-05-27 RX ADMIN — LIDOCAINE HYDROCHLORIDE 3 ML: 10 INJECTION, SOLUTION INFILTRATION; PERINEURAL at 13:50

## 2020-05-27 RX ADMIN — GADOBENATE DIMEGLUMINE 0.01 ML: 529 INJECTION, SOLUTION INTRAVENOUS at 13:50

## 2020-06-01 ENCOUNTER — TELEPHONE (OUTPATIENT)
Dept: ORTHOPEDIC SURGERY | Facility: CLINIC | Age: 75
End: 2020-06-01

## 2020-06-02 ENCOUNTER — TELEPHONE (OUTPATIENT)
Dept: ORTHOPEDIC SURGERY | Facility: CLINIC | Age: 75
End: 2020-06-02

## 2020-06-02 NOTE — TELEPHONE ENCOUNTER
I called and spoke with her.  I gave her the MRI results.  I am going to have the office make her an appointment to see me in the office to discuss further.

## 2020-06-10 ENCOUNTER — ANTICOAGULATION VISIT (OUTPATIENT)
Dept: FAMILY MEDICINE CLINIC | Facility: CLINIC | Age: 75
End: 2020-06-10

## 2020-06-10 LAB — INR PPP: 1.9 (ref 0.9–1.1)

## 2020-06-10 PROCEDURE — 85610 PROTHROMBIN TIME: CPT | Performed by: FAMILY MEDICINE

## 2020-06-15 ENCOUNTER — OFFICE VISIT (OUTPATIENT)
Dept: ORTHOPEDIC SURGERY | Facility: CLINIC | Age: 75
End: 2020-06-15

## 2020-06-15 ENCOUNTER — TELEPHONE (OUTPATIENT)
Dept: FAMILY MEDICINE CLINIC | Facility: CLINIC | Age: 75
End: 2020-06-15

## 2020-06-15 VITALS — TEMPERATURE: 96.1 F | HEIGHT: 60 IN | BODY MASS INDEX: 31.41 KG/M2 | WEIGHT: 160 LBS

## 2020-06-15 DIAGNOSIS — M25.512 LEFT SHOULDER PAIN, UNSPECIFIED CHRONICITY: Primary | ICD-10-CM

## 2020-06-15 PROCEDURE — 20605 DRAIN/INJ JOINT/BURSA W/O US: CPT | Performed by: ORTHOPAEDIC SURGERY

## 2020-06-15 PROCEDURE — 20610 DRAIN/INJ JOINT/BURSA W/O US: CPT | Performed by: ORTHOPAEDIC SURGERY

## 2020-06-15 PROCEDURE — 99213 OFFICE O/P EST LOW 20 MIN: CPT | Performed by: ORTHOPAEDIC SURGERY

## 2020-06-15 RX ORDER — METHYLPREDNISOLONE ACETATE 80 MG/ML
80 INJECTION, SUSPENSION INTRA-ARTICULAR; INTRALESIONAL; INTRAMUSCULAR; SOFT TISSUE
Status: COMPLETED | OUTPATIENT
Start: 2020-06-15 | End: 2020-06-15

## 2020-06-15 RX ADMIN — METHYLPREDNISOLONE ACETATE 80 MG: 80 INJECTION, SUSPENSION INTRA-ARTICULAR; INTRALESIONAL; INTRAMUSCULAR; SOFT TISSUE at 10:54

## 2020-06-15 RX ADMIN — METHYLPREDNISOLONE ACETATE 80 MG: 80 INJECTION, SUSPENSION INTRA-ARTICULAR; INTRALESIONAL; INTRAMUSCULAR; SOFT TISSUE at 10:33

## 2020-06-15 NOTE — TELEPHONE ENCOUNTER
Patient called in stating she's been having ear pain. She says she was prescribed a medicine about a year ago and would like to know if the doctor can prescribe it to her, Neomicin.    [address], confirmed    Patient call back [phone]

## 2020-06-15 NOTE — TELEPHONE ENCOUNTER
Neomycin is a prescription antibiotic - if she is having ear pain she needs to be seen either in the office or an urgent care to confirm whether or not infection is present and if this treatment is appropriate. Thanks!

## 2020-06-15 NOTE — PROGRESS NOTES
Patient: Samira Workman    YOB: 1945    Medical Record Number: [id number]    Chief Complaints: Follow-up regarding left shoulder pain    History of Present Illness:     75 y.o. female patient who presents for follow-up of the left shoulder.  She is now over 6 months out from her rotator cuff repair and reports persistent symptomatology.  Overall, she says that she is better than before surgery but not as good as she had hoped and not as good as her other side which also underwent a repair.  She still has intermittent pain and subjective weakness.  She has been compliant with the therapy and just feels like she has plateaued.  She recently got an MRI and presents today to review that study and discuss further options.    Allergies:   Allergies   Allergen Reactions   • Ciprofibrate Diarrhea   • Ciprofloxacin Diarrhea   • Contrast Dye Itching   • Gabapentin Other (See Comments)     Severe dry mouth.   • Sulfa Antibiotics Itching       Home Medications:    Current Outpatient Medications:   •  benazepril (LOTENSIN) 10 MG tablet, TAKE 1 TABLET BY MOUTH DAILY, Disp: 90 tablet, Rfl: 3  •  Calcium Carb-Cholecalciferol (CALCIUM 1000 + D PO), Take 2 tablets by mouth Daily., Disp: , Rfl:   •  citalopram (CeleXA) 20 MG tablet, Take  by mouth Daily., Disp: , Rfl:   •  diphenhydrAMINE (BENADRYL) 50 MG capsule, Take 1 capsule 1 hour prior to test time by mouth., Disp: 1 capsule, Rfl: 0  •  docusate sodium (COLACE) 100 MG capsule, Take 1 capsule by mouth 2 (Two) Times a Day., Disp: 60 capsule, Rfl: 0  •  HYDROcodone-acetaminophen (NORCO) 7.5-325 MG per tablet, Take 1 tablet by mouth Every 4 (Four) Hours As Needed for Moderate Pain ., Disp: 50 tablet, Rfl: 0  •  lidocaine (LIDODERM) 5 %, Place 1 patch on the skin as directed by provider Daily. Remove & Discard patch within 12 hours or as directed by MD, Disp: 30 patch, Rfl: 1  •  metFORMIN (GLUCOPHAGE) 500 MG tablet, TAKE 1 TABLET BY MOUTH EVERY DAY, Disp: 90 tablet, Rfl:  3  •  Multiple Vitamin (MULTI VITAMIN DAILY PO), Take 1 tablet by mouth Daily., Disp: , Rfl:   •  pravastatin (PRAVACHOL) 40 MG tablet, TAKE 1 TABLET BY MOUTH DAILY, Disp: 90 tablet, Rfl: 0  •  predniSONE (DELTASONE) 50 MG tablet, Take 1 tablet 13 hours, 1 tablet 7 hours, and 1 tablet 1 hour prior to test time (3 doses total) by mouth., Disp: 3 tablet, Rfl: 0  •  traMADol (ULTRAM) 50 MG tablet, Take 1 tablet by mouth 2 (Two) Times a Day As Needed for Moderate Pain ., Disp: 60 tablet, Rfl: 2  •  traMADol (ULTRAM) 50 MG tablet, Take 1 tablet by mouth Every 4 (Four) Hours As Needed for Moderate Pain ., Disp: 60 tablet, Rfl: 0  •  TURMERIC PO, Take 1 tablet by mouth Daily., Disp: , Rfl:   •  warfarin (COUMADIN) 2.5 MG tablet, Take 2.5 mg by mouth 3 (Three) Times a Week. M,W,F, Disp: , Rfl:   •  warfarin (COUMADIN) 5 MG tablet, Take 5 mg by mouth 4 (Four) Times a Week. T,TH,SAT,SUN, Disp: , Rfl:     Past Medical History:   Diagnosis Date   • Anxiety    • Arthritis    • Depression    • Diabetes mellitus (CMS/HCC)    • Hyperlipidemia    • Hypertension    • Low back pain    • Neuropathy    • OA (osteoarthritis)    • Paroxysmal atrial fibrillation (CMS/HCC) 8/6/2016   • Rotator cuff tear        Past Surgical History:   Procedure Laterality Date   • BACK SURGERY      X2   • BLADDER SUSPENSION     • CHOLECYSTECTOMY     • HYSTERECTOMY  1970   • KNEE ARTHROPLASTY Right    • SHOULDER ARTHROSCOPY W/ ROTATOR CUFF REPAIR Left 11/12/2019    Procedure: Arthroscopic rotator cuff repair with subacromial decompression, Arthroscopic distal clavicle excision, and arthroscopic labral debridement ;  Surgeon: Cresencio Christina MD;  [place] [facility] [place] [facility] [place]  Service: Orthopedics   • SHOULDER SURGERY Right        Social History     Occupational History   • Not on file   Tobacco Use   • Smoking status: Never Smoker   • Smokeless tobacco: Never Used   Substance and Sexual Activity   • Alcohol use: Yes     Frequency: Monthly or less     Comment:  "occasionally socially   • Drug use: No   • Sexual activity: Defer      Social History     Social History Narrative   • Not on file       Family History   Problem Relation Age of Onset   • Thyroid disease Mother    • Anxiety disorder Mother    • Depression Mother    • Bipolar disorder Mother    • Heart failure Father    • Hypertension Father    • Kidney nephrosis Brother    • Malig Hyperthermia Neg Hx        Review of Systems:      Constitutional: Denies fever, shaking or chills   Eyes: Denies change in visual acuity   HEENT: Denies nasal congestion or sore throat   Respiratory: Denies cough or shortness of breath   Cardiovascular: Denies chest pain or edema  Endocrine: Denies tremors, palpitations, intolerance of heat or cold, polyuria, polydipsia.  GI: Denies abdominal pain, nausea, vomiting, bloody stools or diarrhea  : Denies frequency, urgency, incontinence, retention, or nocturia.  Musculoskeletal: Denies numbness, tingling or loss of motor function except as above  Integument: Denies rash, lesion or ulceration   Neurologic: Denies headache or focal weakness, deficits  Heme: Denies spontaneous or excessive bleeding, epistaxis, hematuria, melena, fatigue, enlarged or tender lymph nodes.      All other pertinent positives and negatives as noted above in HPI.    Physical Exam:   75 y.o. female  Vitals:    06/15/20 1032   Temp: 96.1 °F (35.6 °C)   Weight: 72.6 kg (160 lb)   Height: 152.4 cm (60\")     General:  Patient is awake and alert.  Appears in no acute distress or discomfort.    Psych:  Affect and demeanor are appropriate.    Eyes:  Conjunctiva and sclera appear grossly normal.  Eyes track well and EOM seem to be intact.    Ears:  No gross abnormalities.  Hearing adequate for the exam.    Cardiovascular:  Regular rate and rhythm.    Lungs:  Good chest expansion.  Breathing unlabored.    Extremities: Left shoulder is examined.  Skin is benign.  Her previous portals are healed.  She has some mild tenderness " over the biceps but nothing exquisite.  She has good motion.  She does have pain with elevation in the scapular plane and weakness.  She has a positive speeds maneuver as well.  Normal motor and sensory function in the lower arm and hand.  Palpable radial pulse.  Brisk capillary refill.    Imaging: MRI of the left shoulder is reviewed along with the associated report.  Findings are listed below:     IMPRESSION:  1.  Partial-thickness articular surface supraspinatus tendon re-tear  with retraction of articular surface fibers to the level of the top of  the humeral head.   2.  Long head biceps tendon tear or tenotomy at its origin.  3.  Previous acromioplasty and distal clavicle excision.    Assessment/Plan:  Left shoulder bursitis and partial-thickness rotator cuff tear status post repair    She has some tenderness over the biceps but her MRI seems to show that the biceps has ruptured.  At the time of her surgery, the biceps was intact and looked okay.  If the biceps is part of her symptomatology then hopefully that will get better with time.  I do think that an injection might help.  I assured her that I do not see any evidence for a high-grade re-tear of the rotator cuff and I do not consider that further surgery is likely to be necessary for her at this point.  I think it is worthwhile that we try an injection for the subacromial space and biceps and see if that helps to get her over the hump.  The risk, benefits and alternatives were discussed.  She tells me she has had injections in the past and done well with those.  She would like to try that.  The injections were performed as described below.  She will follow-up with me as needed.  I told her to give it about 2 weeks and then, if no better, call me.    Cresencio Christina MD    06/15/2020    Large Joint Arthrocentesis: L glenohumeral  Date/Time: 6/15/2020 10:33 AM  Consent given by: patient  Site marked: site marked  Timeout: Immediately prior to procedure a  time out was called to verify the correct patient, procedure, equipment, support staff and site/side marked as required   Supporting Documentation  Indications: pain and joint swelling   Procedure Details  Location: shoulder - L glenohumeral  Preparation: Patient was prepped and draped in the usual sterile fashion  Needle gauge: 21g.  Approach: anterolateral  Medications administered: 2 mL lidocaine (cardiac); 80 mg methylPREDNISolone acetate 80 MG/ML  Patient tolerance: patient tolerated the procedure well with no immediate complications    Medium Joint Arthrocentesis  Date/Time: 6/15/2020 10:54 AM  Site marked: site marked  Timeout: Immediately prior to procedure a time out was called to verify the correct patient, procedure, equipment, support staff and site/side marked as required   Supporting Documentation  Indications: pain   Procedure Details  Location: shoulder (Biceps tendon sheath) -   Preparation: Patient was prepped and draped in the usual sterile fashion  Needle size: 25 G  Approach: anterolateral  Medications administered: 2 mL lidocaine (cardiac); 80 mg methylPREDNISolone acetate 80 MG/ML  Patient tolerance: patient tolerated the procedure well with no immediate complications

## 2020-06-22 ENCOUNTER — APPOINTMENT (OUTPATIENT)
Dept: GENERAL RADIOLOGY | Facility: HOSPITAL | Age: 75
End: 2020-06-22

## 2020-06-22 ENCOUNTER — HOSPITAL ENCOUNTER (EMERGENCY)
Facility: HOSPITAL | Age: 75
Discharge: HOME | End: 2020-06-22
Attending: EMERGENCY MEDICINE | Admitting: EMERGENCY MEDICINE

## 2020-06-22 VITALS
HEIGHT: 61 IN | BODY MASS INDEX: 31.13 KG/M2 | TEMPERATURE: 97.4 F | HEART RATE: 62 BPM | DIASTOLIC BLOOD PRESSURE: 76 MMHG | OXYGEN SATURATION: 93 % | WEIGHT: 164.9 LBS | SYSTOLIC BLOOD PRESSURE: 131 MMHG | RESPIRATION RATE: 18 BRPM

## 2020-06-22 DIAGNOSIS — R06.02 SHORTNESS OF BREATH: Primary | ICD-10-CM

## 2020-06-22 DIAGNOSIS — Z79.01 WARFARIN ANTICOAGULATION: ICD-10-CM

## 2020-06-22 DIAGNOSIS — I10 PRIMARY HYPERTENSION: ICD-10-CM

## 2020-06-22 LAB
ALBUMIN SERPL-MCNC: 4.5 G/DL (ref 3.5–5.2)
ALBUMIN/GLOB SERPL: 1.6 G/DL
ALP SERPL-CCNC: 72 U/L (ref 39–117)
ALT SERPL W P-5'-P-CCNC: 20 U/L (ref 1–33)
ANION GAP SERPL CALCULATED.3IONS-SCNC: 13.4 MMOL/L (ref 5–15)
AST SERPL-CCNC: 15 U/L (ref 1–32)
BASOPHILS # BLD AUTO: 0.05 10*3/MM3 (ref 0–0.2)
BASOPHILS NFR BLD AUTO: 0.5 % (ref 0–1.5)
BILIRUB SERPL-MCNC: 0.4 MG/DL (ref 0.2–1.2)
BUN BLD-MCNC: 23 MG/DL (ref 8–23)
BUN/CREAT SERPL: 33.8 (ref 7–25)
CALCIUM SPEC-SCNC: 9.8 MG/DL (ref 8.6–10.5)
CHLORIDE SERPL-SCNC: 97 MMOL/L (ref 98–107)
CO2 SERPL-SCNC: 25.6 MMOL/L (ref 22–29)
CREAT BLD-MCNC: 0.68 MG/DL (ref 0.57–1)
D DIMER PPP FEU-MCNC: 0.3 MCGFEU/ML (ref 0–0.49)
DEPRECATED RDW RBC AUTO: 40.8 FL (ref 37–54)
EOSINOPHIL # BLD AUTO: 0.33 10*3/MM3 (ref 0–0.4)
EOSINOPHIL NFR BLD AUTO: 3 % (ref 0.3–6.2)
ERYTHROCYTE [DISTWIDTH] IN BLOOD BY AUTOMATED COUNT: 12.6 % (ref 12.3–15.4)
GFR SERPL CREATININE-BSD FRML MDRD: 84 ML/MIN/1.73
GLOBULIN UR ELPH-MCNC: 2.9 GM/DL
GLUCOSE BLD-MCNC: 138 MG/DL (ref 65–99)
HCT VFR BLD AUTO: 45.1 % (ref 34–46.6)
HGB BLD-MCNC: 15.3 G/DL (ref 12–15.9)
HOLD SPECIMEN: NORMAL
HOLD SPECIMEN: NORMAL
IMM GRANULOCYTES # BLD AUTO: 0.06 10*3/MM3 (ref 0–0.05)
IMM GRANULOCYTES NFR BLD AUTO: 0.5 % (ref 0–0.5)
INR PPP: 2.42 (ref 0.9–1.1)
LYMPHOCYTES # BLD AUTO: 3.59 10*3/MM3 (ref 0.7–3.1)
LYMPHOCYTES NFR BLD AUTO: 32.8 % (ref 19.6–45.3)
MCH RBC QN AUTO: 30.5 PG (ref 26.6–33)
MCHC RBC AUTO-ENTMCNC: 33.9 G/DL (ref 31.5–35.7)
MCV RBC AUTO: 90 FL (ref 79–97)
MONOCYTES # BLD AUTO: 0.91 10*3/MM3 (ref 0.1–0.9)
MONOCYTES NFR BLD AUTO: 8.3 % (ref 5–12)
NEUTROPHILS # BLD AUTO: 6 10*3/MM3 (ref 1.7–7)
NEUTROPHILS NFR BLD AUTO: 54.9 % (ref 42.7–76)
NRBC BLD AUTO-RTO: 0 /100 WBC (ref 0–0.2)
PLATELET # BLD AUTO: 292 10*3/MM3 (ref 140–450)
PMV BLD AUTO: 9.7 FL (ref 6–12)
POTASSIUM BLD-SCNC: 4.2 MMOL/L (ref 3.5–5.2)
PROT SERPL-MCNC: 7.4 G/DL (ref 6–8.5)
PROTHROMBIN TIME: 26 SECONDS (ref 11.7–14.2)
RBC # BLD AUTO: 5.01 10*6/MM3 (ref 3.77–5.28)
SODIUM BLD-SCNC: 136 MMOL/L (ref 136–145)
T4 FREE SERPL-MCNC: 0.99 NG/DL (ref 0.93–1.7)
TROPONIN T SERPL-MCNC: <0.01 NG/ML (ref 0–0.03)
TROPONIN T SERPL-MCNC: <0.01 NG/ML (ref 0–0.03)
TSH SERPL DL<=0.05 MIU/L-ACNC: 2.7 UIU/ML (ref 0.27–4.2)
WBC NRBC COR # BLD: 10.94 10*3/MM3 (ref 3.4–10.8)
WHOLE BLOOD HOLD SPECIMEN: NORMAL
WHOLE BLOOD HOLD SPECIMEN: NORMAL

## 2020-06-22 PROCEDURE — 84443 ASSAY THYROID STIM HORMONE: CPT | Performed by: EMERGENCY MEDICINE

## 2020-06-22 PROCEDURE — 71046 X-RAY EXAM CHEST 2 VIEWS: CPT

## 2020-06-22 PROCEDURE — 80053 COMPREHEN METABOLIC PANEL: CPT

## 2020-06-22 PROCEDURE — 93010 ELECTROCARDIOGRAM REPORT: CPT | Performed by: INTERNAL MEDICINE

## 2020-06-22 PROCEDURE — 84484 ASSAY OF TROPONIN QUANT: CPT | Performed by: EMERGENCY MEDICINE

## 2020-06-22 PROCEDURE — 85610 PROTHROMBIN TIME: CPT | Performed by: EMERGENCY MEDICINE

## 2020-06-22 PROCEDURE — 93005 ELECTROCARDIOGRAM TRACING: CPT

## 2020-06-22 PROCEDURE — 36415 COLL VENOUS BLD VENIPUNCTURE: CPT

## 2020-06-22 PROCEDURE — 85025 COMPLETE CBC W/AUTO DIFF WBC: CPT

## 2020-06-22 PROCEDURE — 85379 FIBRIN DEGRADATION QUANT: CPT | Performed by: EMERGENCY MEDICINE

## 2020-06-22 PROCEDURE — 99283 EMERGENCY DEPT VISIT LOW MDM: CPT

## 2020-06-22 PROCEDURE — 93005 ELECTROCARDIOGRAM TRACING: CPT | Performed by: EMERGENCY MEDICINE

## 2020-06-22 PROCEDURE — 84439 ASSAY OF FREE THYROXINE: CPT | Performed by: EMERGENCY MEDICINE

## 2020-06-22 PROCEDURE — 84484 ASSAY OF TROPONIN QUANT: CPT

## 2020-06-22 RX ORDER — ASPIRIN 325 MG
325 TABLET ORAL ONCE
Status: DISCONTINUED | OUTPATIENT
Start: 2020-06-22 | End: 2020-06-22

## 2020-06-22 RX ORDER — SODIUM CHLORIDE 0.9 % (FLUSH) 0.9 %
10 SYRINGE (ML) INJECTION AS NEEDED
Status: DISCONTINUED | OUTPATIENT
Start: 2020-06-22 | End: 2020-06-22 | Stop reason: HOSPADM

## 2020-06-22 NOTE — ED NOTES
Pt presents to ED from [facility] [place] with daughter. Pt complains of being weak with a slow heart rate. [employer] sent EKG over with patient. Pt is A&OX4, able to ambulate in, and in a mask at this time. Pt complains of SOA with activity, but denies CP at this time. Pt states she is on a blood thinner for a-fib.      Avelino Doshi, RN  06/22/20 2568

## 2020-06-22 NOTE — ED NOTES
Pt to the ER with a HR of 56 complaining of weakness. Pt vs stable and in NAD,   Pt alert and oriented.  Pt wearing mask during encounter.  This nurse in room wearing mask, eye protection and gloves      Joshua Mullen RN  06/22/20 9314

## 2020-06-22 NOTE — ED PROVIDER NOTES
EMERGENCY DEPARTMENT ENCOUNTER    Room Number:  12/12  Date of encounter:  6/22/2020  PCP: Anne So MD  Historian: Patient      HPI:  Chief Complaint: Fatigue  A complete HPI/ROS/PMH/PSH/SH/FH are unobtainable due to: None    Context: Samira Workman is a 75 y.o. female who presents to the ED c/o fatigue and shortness of breath.  Is been ongoing for 3 days.  It is rather constant although it seems to resolve whenever she remains still.  It gets worse when she exerts herself.  She denies having any fever, cough, change in smell or taste, chest pain.  No vomiting, abdominal pain, diarrhea, urinary symptoms.  No rash.  Patient takes warfarin for history of atrial fibrillation.      PAST MEDICAL HISTORY  Active Ambulatory Problems     Diagnosis Date Noted   • BMI 28.0-28.9,adult 02/01/2018   • Chronic low back pain without sciatica 11/10/2016   • Controlled type 2 diabetes mellitus with complication, without long-term current use of insulin (CMS/[facility]) 03/23/2017   • Herniated nucleus pulposus, L5-S1 12/20/2017   • Hypertension, essential 01/24/2017   • Long term current use of anticoagulant therapy 01/16/2017   • Mixed hyperlipidemia 01/16/2017   • Osteopenia 03/23/2017   • Paroxysmal atrial fibrillation (CMS/[facility]) 08/06/2016   • Vitamin D deficiency 03/23/2017   • Recurrent major depression in partial remission (CMS/[facility]) 02/01/2018   • Pulmonary nodule 09/10/2019   • Neuropathy 04/23/2019   • Insomnia 06/12/2019   • Anxiety 06/12/2019   • Nontraumatic tear of rotator cuff 10/14/2019     Resolved Ambulatory Problems     Diagnosis Date Noted   • No Resolved Ambulatory Problems     Past Medical History:   Diagnosis Date   • Arthritis    • Depression    • Diabetes mellitus (CMS/[facility])    • Hyperlipidemia    • Hypertension    • Low back pain    • OA (osteoarthritis)    • Rotator cuff tear          PAST SURGICAL HISTORY  Past Surgical History:   Procedure Laterality Date   • BACK SURGERY      X2   • BLADDER SUSPENSION      • CHOLECYSTECTOMY     • HYSTERECTOMY  1970   • KNEE ARTHROPLASTY Right    • SHOULDER ARTHROSCOPY W/ ROTATOR CUFF REPAIR Left 11/12/2019    Procedure: Arthroscopic rotator cuff repair with subacromial decompression, Arthroscopic distal clavicle excision, and arthroscopic labral debridement ;  Surgeon: Cresencio Christina MD;  [place] [facility] [place] [facility] [place]  Service: [place]opedics   • SHOULDER SURGERY Right          FAMILY HISTORY  Family History   Problem Relation Age of Onset   • Thyroid disease Mother    • Anxiety disorder Mother    • Depression Mother    • Bipolar disorder Mother    • Heart failure Father    • Hypertension Father    • Kidney nephrosis Brother    • Malig Hyperthermia Neg Hx          SOCIAL HISTORY  Social History     Socioeconomic History   • Marital status:      Spouse name: Not on file   • Number of children: 2   • Years of education: Not on file   • Highest education level: Not on file   Tobacco Use   • Smoking status: Never Smoker   • Smokeless tobacco: Never Used   Substance and Sexual Activity   • Alcohol use: Yes     Frequency: Monthly or less     Comment: occasionally socially   • Drug use: No   • Sexual activity: Defer         ALLERGIES  Ciprofibrate; Ciprofloxacin; Contrast dye; Gabapentin; and Sulfa antibiotics        REVIEW OF SYSTEMS  Review of Systems     All systems reviewed and negative except for those discussed in HPI.       PHYSICAL EXAM    I have reviewed the triage vital signs and nursing notes.    ED Triage Vitals   Temp Heart Rate Resp BP SpO2   06/22/20 1349 06/22/20 1349 06/22/20 1349 06/22/20 1356 06/22/20 1349   97.4 °F (36.3 °C) 76 16 173/81 93 %      Temp src Heart Rate Source Patient Position BP Location FiO2 (%)   06/22/20 1349 06/22/20 1349 -- -- --   Tympanic Monitor          Physical Exam  GENERAL: not distressed  HENT: nares patent  EYES: no scleral icterus  CV: regular rhythm, regular rate, no murmur  RESPIRATORY: normal effort, clear to auscultation bilaterally,  speaks in full sentences  ABDOMEN: soft, nontender  MUSCULOSKELETAL: no deformity, no lower extremity edema or tenderness  NEURO: alert, moves all extremities, follows commands  SKIN: warm, dry        LAB RESULTS  Recent Results (from the past 24 hour(s))   Comprehensive Metabolic Panel    Collection Time: 06/22/20  1:59 PM   Result Value Ref Range    Glucose 138 (H) 65 - 99 mg/dL    BUN 23 8 - 23 mg/dL    Creatinine 0.68 0.57 - 1.00 mg/dL    Sodium 136 136 - 145 mmol/L    Potassium 4.2 3.5 - 5.2 mmol/L    Chloride 97 (L) 98 - 107 mmol/L    CO2 25.6 22.0 - 29.0 mmol/L    Calcium 9.8 8.6 - 10.5 mg/dL    Total Protein 7.4 6.0 - 8.5 g/dL    Albumin 4.50 3.50 - 5.20 g/dL    ALT (SGPT) 20 1 - 33 U/L    AST (SGOT) 15 1 - 32 U/L    Alkaline Phosphatase 72 39 - 117 U/L    Total Bilirubin 0.4 0.2 - 1.2 mg/dL    eGFR Non African Amer 84 >60 mL/min/1.73    Globulin 2.9 gm/dL    A/G Ratio 1.6 g/dL    BUN/Creatinine Ratio 33.8 (H) 7.0 - 25.0    Anion Gap 13.4 5.0 - 15.0 mmol/L   Troponin    Collection Time: 06/22/20  1:59 PM   Result Value Ref Range    Troponin T <0.010 0.000 - 0.030 ng/mL   Light Blue Top    Collection Time: 06/22/20  1:59 PM   Result Value Ref Range    Extra Tube hold for add-on    Green Top (Gel)    Collection Time: 06/22/20  1:59 PM   Result Value Ref Range    Extra Tube Hold for add-ons.    Lavender Top    Collection Time: 06/22/20  1:59 PM   Result Value Ref Range    Extra Tube hold for add-on    Gold Top - SST    Collection Time: 06/22/20  1:59 PM   Result Value Ref Range    Extra Tube Hold for add-ons.    CBC Auto Differential    Collection Time: 06/22/20  1:59 PM   Result Value Ref Range    WBC 10.94 (H) 3.40 - 10.80 10*3/mm3    RBC 5.01 3.77 - 5.28 10*6/mm3    Hemoglobin 15.3 12.0 - 15.9 g/dL    Hematocrit 45.1 34.0 - 46.6 %    MCV 90.0 79.0 - 97.0 fL    MCH 30.5 26.6 - 33.0 pg    MCHC 33.9 31.5 - 35.7 g/dL    RDW 12.6 12.3 - 15.4 %    RDW-SD 40.8 37.0 - 54.0 fl    MPV 9.7 6.0 - 12.0 fL    Platelets  292 140 - 450 10*3/mm3    Neutrophil % 54.9 42.7 - 76.0 %    Lymphocyte % 32.8 19.6 - 45.3 %    Monocyte % 8.3 5.0 - 12.0 %    Eosinophil % 3.0 0.3 - 6.2 %    Basophil % 0.5 0.0 - 1.5 %    Immature Grans % 0.5 0.0 - 0.5 %    Neutrophils, Absolute 6.00 1.70 - 7.00 10*3/mm3    Lymphocytes, Absolute 3.59 (H) 0.70 - 3.10 10*3/mm3    Monocytes, Absolute 0.91 (H) 0.10 - 0.90 10*3/mm3    Eosinophils, Absolute 0.33 0.00 - 0.40 10*3/mm3    Basophils, Absolute 0.05 0.00 - 0.20 10*3/mm3    Immature Grans, Absolute 0.06 (H) 0.00 - 0.05 10*3/mm3    nRBC 0.0 0.0 - 0.2 /100 WBC   Protime-INR    Collection Time: 06/22/20  1:59 PM   Result Value Ref Range    Protime 26.0 (H) 11.7 - 14.2 Seconds    INR 2.42 (H) 0.90 - 1.10   D-dimer, Quantitative    Collection Time: 06/22/20  1:59 PM   Result Value Ref Range    D-Dimer, Quantitative 0.30 0.00 - 0.49 MCGFEU/mL   TSH    Collection Time: 06/22/20  1:59 PM   Result Value Ref Range    TSH 2.700 0.270 - 4.200 uIU/mL   T4, Free    Collection Time: 06/22/20  1:59 PM   Result Value Ref Range    Free T4 0.99 0.93 - 1.70 ng/dL       Ordered the above labs and independently reviewed the results.        RADIOLOGY  Xr Chest 2 View    Result Date: 6/22/2020  XR CHEST 2 VW-  Clinical: Chest pain protocol  FINDINGS: There is subtle eventration of the lateral left hemidiaphragm and along the costophrenic sulcus there is a curvilinear opacity suggestive of probable atelectasis.  The right lung is clear. No consolidation within either lung.  The heart size normal. No mediastinal or hilar abnormality seen. There is first rib and calcification, this greater on the left than the right. No suspicious pulmonary lesion identified. No effusion or edema. There is T-spine scoliosis convexity towards the right.  CONCLUSION: Small curvilinear opacity at the left costophrenic sulcus likely discoid atelectasis.  This report was finalized on 6/22/2020 3:39 PM by Dr. Da Steele M.D.        I ordered the above  noted radiological studies. Reviewed by me and discussed with radiologist.  See dictation for official radiology interpretation.      PROCEDURES    Procedures      MEDICATIONS GIVEN IN ER    Medications   sodium chloride 0.9 % flush 10 mL (has no administration in time range)         PROGRESS, DATA ANALYSIS, CONSULTS, AND MEDICAL DECISION MAKING    All labs have been independently reviewed by me.  All radiology studies have been reviewed by me and discussed with radiologist dictating the report.   EKG's independently viewed and interpreted by me.  Discussion below represents my analysis of pertinent findings related to patient's condition, differential diagnosis, treatment plan and final disposition.    Differential diagnosis includes ACS, pneumonia, viral syndrome, COVID-19, pulmonary embolism, electrolyte abnormality, metabolic derangement such as DKA or thyroid abnormality.    ED Course as of Jun 22 1615   Mon Jun 22, 2020   1505 EKG interpreted by myself.  Time 1351.  Sinus rhythm.  Heart rate 54.  Normal axis.  Normal intervals.  No acute ST abnormality.    [TD]   1608 D-Dimer, Quant: 0.30 [TD]   1608 INR(!): 2.42 [TD]   1614 Chest x-ray interpreted by myself.  No evidence of pneumonia.    [TD]      ED Course User Index  [TD] Chan Doshi II, MD     I discussed the lab findings with the patient.  She has had no chest pain.  I see very little to no marginal utility to having a second troponin.    D-dimer is negative.  Patient is therapeutic in terms of her INR.  She is satting in the mid 90s on room air.  She herself feels very convinced that this is related to stress given that her mother is in a nursing home and she is not been able to see her for a couple months now.    I give the patient good return precautions, including fever, worsening shortness of breath or persistent shortness of breath, development of chest pain.  She verbalized understanding and agreed with the plan.      PPE: Both the patient  and I wore a surgical mask throughout the entire patient encounter.     AS OF 16:15 VITALS:    BP - 173/81  HR - 76  TEMP - 97.4 °F (36.3 °C) (Tympanic)  O2 SATS - 93%        DIAGNOSIS  Final diagnoses:   Shortness of breath   Warfarin anticoagulation   Primary hypertension         DISPOSITION  DISCHARGE    FOLLOW-UP  Anne So MD  [address] [facility] [address] [phone]    Schedule an appointment as soon as possible for a visit   As needed    [facility] Emergency Department  [address] [facility] [address] [phone]  Go in 1 day  If symptoms worsen         Medication List      No changes were made to your prescriptions during this visit.                Chan Doshi II, MD  06/22/20 [id number]

## 2020-06-25 ENCOUNTER — ANTICOAGULATION VISIT (OUTPATIENT)
Dept: FAMILY MEDICINE CLINIC | Facility: CLINIC | Age: 75
End: 2020-06-25

## 2020-06-25 LAB — INR PPP: 3.5 (ref 0.9–1.1)

## 2020-06-25 PROCEDURE — 85610 PROTHROMBIN TIME: CPT | Performed by: FAMILY MEDICINE

## 2020-06-25 PROCEDURE — 36416 COLLJ CAPILLARY BLOOD SPEC: CPT | Performed by: FAMILY MEDICINE

## 2020-06-25 NOTE — PATIENT INSTRUCTIONS
6/25/2020 INR 3.5. Per Dr mendoza, pt takes 2.5 mg on Mon, Wed, Fri and Sat. Take 5mg the rest recheck in 2 weeks. NA

## 2020-07-09 ENCOUNTER — ANTICOAGULATION VISIT (OUTPATIENT)
Dept: FAMILY MEDICINE CLINIC | Facility: CLINIC | Age: 75
End: 2020-07-09

## 2020-07-09 DIAGNOSIS — I48.0 PAROXYSMAL ATRIAL FIBRILLATION (HCC): Primary | ICD-10-CM

## 2020-07-09 LAB — INR PPP: 1.8 (ref 0.9–1.1)

## 2020-07-09 PROCEDURE — 85610 PROTHROMBIN TIME: CPT | Performed by: FAMILY MEDICINE

## 2020-07-09 PROCEDURE — 36416 COLLJ CAPILLARY BLOOD SPEC: CPT | Performed by: FAMILY MEDICINE

## 2020-07-09 RX ORDER — WARFARIN SODIUM 2.5 MG/1
2.5 TABLET ORAL
Qty: 30 TABLET | Refills: 3 | Status: SHIPPED | OUTPATIENT
Start: 2020-07-09 | End: 2020-09-01

## 2020-07-09 RX ORDER — WARFARIN SODIUM 5 MG/1
5 TABLET ORAL 3 TIMES WEEKLY
Qty: 30 TABLET | Refills: 3 | Status: SHIPPED | OUTPATIENT
Start: 2020-07-10 | End: 2020-09-01

## 2020-07-15 ENCOUNTER — PATIENT OUTREACH (OUTPATIENT)
Dept: CASE MANAGEMENT | Facility: OTHER | Age: 75
End: 2020-07-15

## 2020-07-15 NOTE — OUTREACH NOTE
LVM for pt to return call to schedule AWTYLER Cary  [facility] Clinical Coordinator  [phone]

## 2020-07-23 ENCOUNTER — ANTICOAGULATION VISIT (OUTPATIENT)
Dept: FAMILY MEDICINE CLINIC | Facility: CLINIC | Age: 75
End: 2020-07-23

## 2020-07-23 DIAGNOSIS — I48.0 PAROXYSMAL ATRIAL FIBRILLATION (HCC): Primary | ICD-10-CM

## 2020-07-23 LAB — INR PPP: 1.9 (ref 0.9–1.1)

## 2020-07-23 PROCEDURE — 36416 COLLJ CAPILLARY BLOOD SPEC: CPT | Performed by: NURSE PRACTITIONER

## 2020-07-23 PROCEDURE — 85610 PROTHROMBIN TIME: CPT | Performed by: NURSE PRACTITIONER

## 2020-08-06 ENCOUNTER — ANTICOAGULATION VISIT (OUTPATIENT)
Dept: FAMILY MEDICINE CLINIC | Facility: CLINIC | Age: 75
End: 2020-08-06

## 2020-08-06 DIAGNOSIS — I48.0 PAROXYSMAL ATRIAL FIBRILLATION (HCC): Primary | ICD-10-CM

## 2020-08-06 LAB — INR PPP: 1.8 (ref 0.9–1.1)

## 2020-08-06 PROCEDURE — 36416 COLLJ CAPILLARY BLOOD SPEC: CPT | Performed by: FAMILY MEDICINE

## 2020-08-06 PROCEDURE — 85610 PROTHROMBIN TIME: CPT | Performed by: FAMILY MEDICINE

## 2020-08-06 PROCEDURE — 93793 ANTICOAG MGMT PT WARFARIN: CPT | Performed by: FAMILY MEDICINE

## 2020-08-10 RX ORDER — PRAVASTATIN SODIUM 40 MG
TABLET ORAL
Qty: 90 TABLET | Refills: 0 | Status: SHIPPED | OUTPATIENT
Start: 2020-08-10 | End: 2020-11-04

## 2020-09-01 ENCOUNTER — OFFICE VISIT (OUTPATIENT)
Dept: FAMILY MEDICINE CLINIC | Facility: CLINIC | Age: 75
End: 2020-09-01

## 2020-09-01 VITALS
TEMPERATURE: 96.8 F | BODY MASS INDEX: 30.25 KG/M2 | OXYGEN SATURATION: 99 % | HEIGHT: 61 IN | DIASTOLIC BLOOD PRESSURE: 78 MMHG | HEART RATE: 78 BPM | WEIGHT: 160.2 LBS | SYSTOLIC BLOOD PRESSURE: 128 MMHG

## 2020-09-01 DIAGNOSIS — Z13.0 SCREENING FOR DEFICIENCY ANEMIA: ICD-10-CM

## 2020-09-01 DIAGNOSIS — I48.0 PAROXYSMAL ATRIAL FIBRILLATION (HCC): ICD-10-CM

## 2020-09-01 DIAGNOSIS — E11.8 CONTROLLED TYPE 2 DIABETES MELLITUS WITH COMPLICATION, WITHOUT LONG-TERM CURRENT USE OF INSULIN (HCC): ICD-10-CM

## 2020-09-01 DIAGNOSIS — Z00.00 MEDICARE ANNUAL WELLNESS VISIT, SUBSEQUENT: Primary | ICD-10-CM

## 2020-09-01 DIAGNOSIS — Z13.220 SCREENING FOR HYPERLIPIDEMIA: ICD-10-CM

## 2020-09-01 DIAGNOSIS — Z79.01 LONG TERM CURRENT USE OF ANTICOAGULANT THERAPY: ICD-10-CM

## 2020-09-01 DIAGNOSIS — I10 HYPERTENSION, ESSENTIAL: ICD-10-CM

## 2020-09-01 DIAGNOSIS — G62.9 NEUROPATHY: ICD-10-CM

## 2020-09-01 DIAGNOSIS — H69.83 DYSFUNCTION OF BOTH EUSTACHIAN TUBES: ICD-10-CM

## 2020-09-01 LAB — INR PPP: 2.2 (ref 0.9–1.1)

## 2020-09-01 PROCEDURE — G0439 PPPS, SUBSEQ VISIT: HCPCS | Performed by: FAMILY MEDICINE

## 2020-09-01 PROCEDURE — 96160 PT-FOCUSED HLTH RISK ASSMT: CPT | Performed by: FAMILY MEDICINE

## 2020-09-01 PROCEDURE — 36416 COLLJ CAPILLARY BLOOD SPEC: CPT | Performed by: FAMILY MEDICINE

## 2020-09-01 PROCEDURE — 85610 PROTHROMBIN TIME: CPT | Performed by: FAMILY MEDICINE

## 2020-09-01 RX ORDER — TRAMADOL HYDROCHLORIDE 50 MG/1
50 TABLET ORAL 2 TIMES DAILY PRN
Qty: 60 TABLET | Refills: 2 | Status: SHIPPED | OUTPATIENT
Start: 2020-09-01 | End: 2020-12-01 | Stop reason: SDUPTHER

## 2020-09-01 RX ORDER — WARFARIN SODIUM 2.5 MG/1
TABLET ORAL
Qty: 30 TABLET | Refills: 3
Start: 2020-09-01 | End: 2022-04-29 | Stop reason: SDUPTHER

## 2020-09-01 RX ORDER — WARFARIN SODIUM 5 MG/1
TABLET ORAL
Qty: 30 TABLET | Refills: 3
Start: 2020-09-01 | End: 2021-01-25

## 2020-09-01 NOTE — PROGRESS NOTES
The ABCs of the Annual Wellness Visit  Subsequent Medicare Wellness Visit    Chief Complaint   Patient presents with   • Medicare Wellness-subsequent     pt is here for SMW       Subjective   History of Present Illness:  Samira Workman is a 75 y.o. female who presents for a Subsequent Medicare Wellness Visit. She is also here for INR check.     INR check - patient is on anticoagulartion therapy with coumadin for paroxysmal atrial fibrillation.  Her INR has been quite unstable either subtherapeutic or supratherapeutic for several months.  Her last INR just a little less than a month ago was 1.8.  Increase patient's dosing regimen.  Since that time she has been taking 5 mg of Coumadin 5 days out of 7 of the week and 2.5 mg of Coumadin 2 days of the week.    HEALTH RISK ASSESSMENT    Recent Hospitalizations:  No hospitalization(s) within the last year.    Current Medical Providers:  Patient Care Team:  Anne So MD as PCP - General (Family Medicine)  Linda Shin MD as Consulting Physician (Cardiology)    Smoking Status:  Social History     Tobacco Use   Smoking Status Never Smoker   Smokeless Tobacco Never Used       Alcohol Consumption:  Social History     Substance and Sexual Activity   Alcohol Use Yes   • Frequency: Monthly or less    Comment: occasionally socially       Depression Screen:   PHQ-2/PHQ-9 Depression Screening 9/1/2020   Little interest or pleasure in doing things 0   Feeling down, depressed, or hopeless 0   Total Score 0       Fall Risk Screen:  BERENICE Fall Risk Assessment was completed, and patient is at LOW risk for falls.Assessment completed on:9/1/2020    Health Habits and Functional and Cognitive Screening:  Functional & Cognitive Status 9/1/2020   Do you have difficulty preparing food and eating? Yes   Do you have difficulty bathing yourself, getting dressed or grooming yourself? No   Do you have difficulty using the toilet? No   Do you have difficulty moving around from place to place?  No   Do you have trouble with steps or getting out of a bed or a chair? No   Current Diet Unhealthy Diet   Dental Exam Not up to date   Eye Exam Up to date   Exercise (times per week) 3 times per week   Current Exercise Activities Include Walking   Do you need help using the phone?  No   Are you deaf or do you have serious difficulty hearing?  Yes   Do you need help with transportation? No   Do you need help shopping? No   Do you need help preparing meals?  Yes   Do you need help with housework?  Yes   Do you need help with laundry? Yes   Do you need help taking your medications? No   Do you need help managing money? No   Do you ever drive or ride in a car without wearing a seat belt? No   Have you felt unusual stress, anger or loneliness in the last month? Yes   Who do you live with? Child   If you need help, do you have trouble finding someone available to you? No   Have you been bothered in the last four weeks by sexual problems? No   Do you have difficulty concentrating, remembering or making decisions? Yes       Does the patient have evidence of cognitive impairment? No    Asprin use counseling:Does not need ASA (and currently is not on it)    Age-appropriate Screening Schedule:  Refer to the list below for future screening recommendations based on patient's age, sex and/or medical conditions. Orders for these recommended tests are listed in the plan section. The patient has been provided with a written plan.    Health Maintenance   Topic Date Due   • URINE MICROALBUMIN  1945   • TDAP/TD VACCINES (1 - Tdap) 01/14/1956   • ZOSTER VACCINE (1 of 2) 01/14/1995   • LIPID PANEL  08/12/2019   • DIABETIC EYE EXAM  08/12/2019   • INFLUENZA VACCINE  08/01/2020   • DXA SCAN  09/01/2021 (Originally 8/12/2019)   • HEMOGLOBIN A1C  11/26/2020   • DIABETIC FOOT EXAM  05/26/2021   • COLONOSCOPY  Discontinued          The following portions of the patient's history were reviewed and updated as appropriate: allergies,  current medications, past family history, past medical history, past social history, past surgical history and problem list.    Outpatient Medications Prior to Visit   Medication Sig Dispense Refill   • benazepril (LOTENSIN) 10 MG tablet TAKE 1 TABLET BY MOUTH DAILY 90 tablet 3   • Calcium Carb-Cholecalciferol (CALCIUM 1000 + D PO) Take 2 tablets by mouth Daily.     • citalopram (CeleXA) 20 MG tablet Take  by mouth Daily.     • docusate sodium (COLACE) 100 MG capsule Take 1 capsule by mouth 2 (Two) Times a Day. 60 capsule 0   • lidocaine (LIDODERM) 5 % Place 1 patch on the skin as directed by provider Daily. Remove & Discard patch within 12 hours or as directed by MD 30 patch 1   • metFORMIN (GLUCOPHAGE) 500 MG tablet TAKE 1 TABLET BY MOUTH EVERY DAY 90 tablet 3   • Multiple Vitamin (MULTI VITAMIN DAILY PO) Take 1 tablet by mouth Daily.     • pravastatin (PRAVACHOL) 40 MG tablet TAKE 1 TABLET BY MOUTH DAILY 90 tablet 0   • TURMERIC PO Take 1 tablet by mouth Daily.     • traMADol (ULTRAM) 50 MG tablet Take 1 tablet by mouth 2 (Two) Times a Day As Needed for Moderate Pain . 60 tablet 2   • warfarin (COUMADIN) 2.5 MG tablet Take 1 tablet by mouth 4 (Four) Times a Week. M,W,F 30 tablet 3   • warfarin (COUMADIN) 5 MG tablet Take 1 tablet by mouth 3 (Three) Times a Week. T,TH,SAT,SUN 30 tablet 3   • diphenhydrAMINE (BENADRYL) 50 MG capsule Take 1 capsule 1 hour prior to test time by mouth. 1 capsule 0   • HYDROcodone-acetaminophen (NORCO) 7.5-325 MG per tablet Take 1 tablet by mouth Every 4 (Four) Hours As Needed for Moderate Pain . 50 tablet 0   • predniSONE (DELTASONE) 50 MG tablet Take 1 tablet 13 hours, 1 tablet 7 hours, and 1 tablet 1 hour prior to test time (3 doses total) by mouth. 3 tablet 0   • traMADol (ULTRAM) 50 MG tablet Take 1 tablet by mouth Every 4 (Four) Hours As Needed for Moderate Pain . 60 tablet 0     No facility-administered medications prior to visit.        Patient Active Problem List   Diagnosis  "  • BMI 28.0-28.9,adult   • Chronic low back pain without sciatica   • Controlled type 2 diabetes mellitus with complication, without long-term current use of insulin (CMS/HCC)   • Herniated nucleus pulposus, L5-S1   • Hypertension, essential   • Long term current use of anticoagulant therapy   • Mixed hyperlipidemia   • Osteopenia   • Paroxysmal atrial fibrillation (CMS/HCC)   • Vitamin D deficiency   • Recurrent major depression in partial remission (CMS/HCC)   • Pulmonary nodule   • Neuropathy   • Insomnia   • Anxiety   • Nontraumatic tear of rotator cuff       Advanced Care Planning:  ACP discussion was held with the patient during this visit. Patient does not have an ACP but familiar with process as she did this for mother. Will make one and bring me a copy    Review of Systems   Constitutional: Negative.    HENT: Negative.    Eyes: Negative.    Respiratory: Negative.    Cardiovascular: Negative.  Negative for chest pain, palpitations and leg swelling.   Gastrointestinal: Negative.    Endocrine: Negative.    Genitourinary: Negative.    Musculoskeletal: Positive for arthralgias and back pain.        R foot and L lumbar pain   Skin: Negative.    Allergic/Immunologic: Negative.    Neurological: Negative.    Hematological: Negative.    Psychiatric/Behavioral: Negative.        Compared to one year ago, the patient feels her physical health is worse. Says she feels like she is on \"the decline\".Feels that she sleeps too much, has pain in her feet and back.   Compared to one year ago, the patient feels her mental health is worse.Feels sad that her mother is dying, and neuropathy in her feet.     Reviewed chart for potential of high risk medication in the elderly: yes  Reviewed chart for potential of harmful drug interactions in the elderly:yes    Objective         Vitals:    09/01/20 1101   BP: 128/78   BP Location: Right arm   Patient Position: Sitting   Pulse: 78   Temp: 96.8 °F (36 °C)   SpO2: 99%   Weight: 72.7 kg " "(160 lb 3.2 oz)   Height: 154.9 cm (61\")       Body mass index is 30.27 kg/m².  Discussed the patient's BMI with her. The BMI is above average; BMI management plan is completed.    Physical Exam   Constitutional: She is oriented to person, place, and time. She appears well-developed and well-nourished. No distress.   HENT:   Head: Normocephalic and atraumatic.   Right Ear: External ear normal.   Left Ear: External ear normal.   Eyes: Conjunctivae are normal. Right eye exhibits no discharge. Left eye exhibits no discharge. No scleral icterus.   Pulmonary/Chest: Effort normal. No respiratory distress.   Neurological: She is alert and oriented to person, place, and time.   Skin: No erythema. No pallor.   Psychiatric: She has a normal mood and affect. Her behavior is normal. Judgment and thought content normal.   Vitals reviewed.            Assessment/Plan   Medicare Risks and Personalized Health Plan  CMS Preventative Services Quick Reference  Advance Directive Discussion  Breast Cancer/Mammogram Screening  Chronic Pain   Colon Cancer Screening  Depression/Dysphoria  Immunizations Discussed/Encouraged (specific immunizations; Shingrix )  Obesity/Overweight     The above risks/problems have been discussed with the patient.  Pertinent information has been shared with the patient in the After Visit Summary.  Follow up plans and orders are seen below in the Assessment/Plan Section.    Diagnoses and all orders for this visit:    1. Medicare annual wellness visit, subsequent (Primary)  Recommended patient make advanced care plan, patient later with the process and intends on doing this and can get me a copy.  She recently went through it with her mother.  -Discussed recommendations for breast cancer and colon cancer screening, discussed that formal recommendation ends at age 75.  Patient declines undergoing further screening for either of these.  -Discussed chronic pain, patient takes tramadol 1-2 times a day to help deal " with this.  Has undergone multiple procedures and has trialed different medications without significant relief.  Current treatment plan seems to be the best option for her.  -Recommended immunization for shingles, currently not available in the office.  -Discussed weight, patient is overweight.    2. Hypertension, essential  Chronic, will check CMP for renal function.  -     Comprehensive Metabolic Panel    3. Controlled type 2 diabetes mellitus with complication, without long-term current use of insulin (CMS/[facility], [facility])  -     Hemoglobin A1c    4. Neuropathy  Patient needs refill of tramadol, Noe was reviewed and appropriate.  -     traMADol (ULTRAM) 50 MG tablet; Take 1 tablet by mouth 2 (Two) Times a Day As Needed for Moderate Pain .  Dispense: 60 tablet; Refill: 2    5. Screening for hyperlipidemia  -     Lipid Panel    6. Screening for deficiency anemia  -     CBC & Differential    7. Long term current use of anticoagulant therapy  -     POCT INR  Patient's INR today was 2.2 which is subtherapeutic.  Goal is between 2.5-3.5.  Have been changing patient's Coumadin dosing on a regular basis for the last year and have not been able to get good control.  Did have her make a change to taking 2.5 mg only once a week.  We will have her recheck in 2 to 4 weeks.  Will send in prescription for alternative anticoagulant to see if insurance will cover it given the difficulty in maintaining levels that are appropriate on warfarin.  -     warfarin (COUMADIN) 5 MG tablet; To take 5 mg po 6 days of the week (Tues, Wed, Thurs, Fri, Sat, Sun)  Dispense: 30 tablet; Refill: 3  -     warfarin (COUMADIN) 2.5 MG tablet; Take one tablet by mouth one day of the week - Monday  Dispense: 30 tablet; Refill: 3      Follow Up:  No follow-ups on file.     An After Visit Summary and PPPS were given to the patient.

## 2020-09-02 LAB
ALBUMIN SERPL-MCNC: 4.4 G/DL (ref 3.5–5.2)
ALBUMIN/GLOB SERPL: 1.5 G/DL
ALP SERPL-CCNC: 71 U/L (ref 39–117)
ALT SERPL-CCNC: 22 U/L (ref 1–33)
AST SERPL-CCNC: 24 U/L (ref 1–32)
BASOPHILS # BLD AUTO: 0.05 10*3/MM3 (ref 0–0.2)
BASOPHILS NFR BLD AUTO: 0.7 % (ref 0–1.5)
BILIRUB SERPL-MCNC: 0.4 MG/DL (ref 0–1.2)
BUN SERPL-MCNC: 16 MG/DL (ref 8–23)
BUN/CREAT SERPL: 25.8 (ref 7–25)
CALCIUM SERPL-MCNC: 10.4 MG/DL (ref 8.6–10.5)
CHLORIDE SERPL-SCNC: 100 MMOL/L (ref 98–107)
CHOLEST SERPL-MCNC: 180 MG/DL (ref 0–200)
CO2 SERPL-SCNC: 28.7 MMOL/L (ref 22–29)
CREAT SERPL-MCNC: 0.62 MG/DL (ref 0.57–1)
EOSINOPHIL # BLD AUTO: 0.25 10*3/MM3 (ref 0–0.4)
EOSINOPHIL NFR BLD AUTO: 3.5 % (ref 0.3–6.2)
ERYTHROCYTE [DISTWIDTH] IN BLOOD BY AUTOMATED COUNT: 12.8 % (ref 12.3–15.4)
GLOBULIN SER CALC-MCNC: 3 GM/DL
GLUCOSE SERPL-MCNC: 113 MG/DL (ref 65–99)
HBA1C MFR BLD: 6.2 % (ref 4.8–5.6)
HCT VFR BLD AUTO: 40.8 % (ref 34–46.6)
HDLC SERPL-MCNC: 60 MG/DL (ref 40–60)
HGB BLD-MCNC: 14.1 G/DL (ref 12–15.9)
IMM GRANULOCYTES # BLD AUTO: 0.01 10*3/MM3 (ref 0–0.05)
IMM GRANULOCYTES NFR BLD AUTO: 0.1 % (ref 0–0.5)
LDLC SERPL CALC-MCNC: 91 MG/DL (ref 0–100)
LYMPHOCYTES # BLD AUTO: 2.19 10*3/MM3 (ref 0.7–3.1)
LYMPHOCYTES NFR BLD AUTO: 30.6 % (ref 19.6–45.3)
MCH RBC QN AUTO: 30.9 PG (ref 26.6–33)
MCHC RBC AUTO-ENTMCNC: 34.6 G/DL (ref 31.5–35.7)
MCV RBC AUTO: 89.3 FL (ref 79–97)
MONOCYTES # BLD AUTO: 0.56 10*3/MM3 (ref 0.1–0.9)
MONOCYTES NFR BLD AUTO: 7.8 % (ref 5–12)
NEUTROPHILS # BLD AUTO: 4.1 10*3/MM3 (ref 1.7–7)
NEUTROPHILS NFR BLD AUTO: 57.3 % (ref 42.7–76)
NRBC BLD AUTO-RTO: 0 /100 WBC (ref 0–0.2)
PLATELET # BLD AUTO: 252 10*3/MM3 (ref 140–450)
POTASSIUM SERPL-SCNC: 4.6 MMOL/L (ref 3.5–5.2)
PROT SERPL-MCNC: 7.4 G/DL (ref 6–8.5)
RBC # BLD AUTO: 4.57 10*6/MM3 (ref 3.77–5.28)
SODIUM SERPL-SCNC: 139 MMOL/L (ref 136–145)
TRIGL SERPL-MCNC: 144 MG/DL (ref 0–150)
VLDLC SERPL CALC-MCNC: 28.8 MG/DL (ref 5–40)
WBC # BLD AUTO: 7.16 10*3/MM3 (ref 3.4–10.8)

## 2020-09-22 ENCOUNTER — OFFICE VISIT (OUTPATIENT)
Dept: FAMILY MEDICINE CLINIC | Facility: CLINIC | Age: 75
End: 2020-09-22

## 2020-09-22 VITALS
HEART RATE: 68 BPM | SYSTOLIC BLOOD PRESSURE: 126 MMHG | TEMPERATURE: 98.2 F | HEIGHT: 61 IN | DIASTOLIC BLOOD PRESSURE: 78 MMHG | WEIGHT: 162 LBS | BODY MASS INDEX: 30.58 KG/M2 | OXYGEN SATURATION: 98 %

## 2020-09-22 DIAGNOSIS — Z79.01 LONG TERM CURRENT USE OF ANTICOAGULANT THERAPY: ICD-10-CM

## 2020-09-22 DIAGNOSIS — J03.90 TONSILLITIS: Primary | ICD-10-CM

## 2020-09-22 DIAGNOSIS — R41.3 MEMORY DIFFICULTY: ICD-10-CM

## 2020-09-22 LAB — INR PPP: 2.6 (ref 0.9–1.1)

## 2020-09-22 PROCEDURE — 85610 PROTHROMBIN TIME: CPT | Performed by: FAMILY MEDICINE

## 2020-09-22 PROCEDURE — 99214 OFFICE O/P EST MOD 30 MIN: CPT | Performed by: FAMILY MEDICINE

## 2020-09-22 PROCEDURE — 36416 COLLJ CAPILLARY BLOOD SPEC: CPT | Performed by: FAMILY MEDICINE

## 2020-09-22 RX ORDER — AMOXICILLIN 500 MG/1
500 TABLET, FILM COATED ORAL 2 TIMES DAILY
Qty: 20 TABLET | Refills: 0 | Status: SHIPPED | OUTPATIENT
Start: 2020-09-22 | End: 2020-10-02

## 2020-09-22 NOTE — PROGRESS NOTES
"    Samira Workman is a 75 y.o. female.     Chief Complaint   Patient presents with   • Earache     pt's R ear is in sharp pain since this am   • Sore Throat     pt has a sore throat that \"feels like knives cutting\" since waking today      Masks/face shield/appropriate PPE were worn for the entirety of the visit by the patient, MA, and provider.     HPI     Pt is a pleasant 75 y.o. YO female here for new symptoms of right sided throat pain.     Throat Pain - Symptoms started this morning, she describes feeling a sharp pain on the right side of her throat as well as feeling that throat is swollen, tender on the right side. Says that when she used her mouthwash brown stuff came up from the back of her throat which is unusual.     Does have right sided earache as well but this is more of a chronic issue for which she is seeing ENT.     INR check - patient wants to check INR, has had issues with variance in her warfarin dosing.  Sent in prescription for Eliquis at last visit to potentially change her to this the patient stated that it would cost about $47 which is too high for her.    Memory issues -patient states that her family, in particular her daughter whom she lives with, is concerned that she has some memory issues.  Patient does admit that she often has trouble recalling names and sometimes things that she has done but she does not really think there is a problem to the extent that her daughter says.     The following portions of the patient's history were reviewed by me and updated as appropriate: allergies, current medications, past family history, past medical history, past social history, past surgical history, and problem list.     Review of Systems   Constitutional: Negative.    HENT: Positive for ear pain, sore throat and trouble swallowing. Negative for voice change.    Eyes: Negative.    Respiratory: Negative.    Cardiovascular: Negative.  Negative for chest pain, palpitations and leg swelling. "   Gastrointestinal: Negative.    Endocrine: Negative.    Genitourinary: Negative.    Musculoskeletal: Negative.    Skin: Negative.    Allergic/Immunologic: Negative.    Neurological: Negative.    Psychiatric/Behavioral: Negative.    I have reviewed and confirmed the accuracy of the ROS as documented by the MA/LPN/RN Anne Leiva MD      Objective  Vitals:    09/22/20 1129   BP: 126/78   Pulse: 68   Temp: 98.2 °F (36.8 °C)   SpO2: 98%     Body mass index is 30.61 kg/m².       Physical Exam  Vitals signs reviewed.   Constitutional:       General: She is not in acute distress.     Appearance: She is well-developed.   HENT:      Head: Normocephalic and atraumatic.      Right Ear: Tympanic membrane, ear canal and external ear normal.      Left Ear: Tympanic membrane, ear canal and external ear normal.      Nose: Nose normal.      Mouth/Throat:      Mouth: Mucous membranes are moist.      Pharynx: Posterior oropharyngeal erythema present. No oropharyngeal exudate.      Comments: Erythema and tonsillar swelling on the right side  Eyes:      General: No scleral icterus.        Right eye: No discharge.         Left eye: No discharge.      Conjunctiva/sclera: Conjunctivae normal.   Neck:      Musculoskeletal: Neck supple.   Pulmonary:      Effort: Pulmonary effort is normal. No respiratory distress.   Lymphadenopathy:      Cervical: No cervical adenopathy.   Skin:     Coloration: Skin is not pale.      Findings: No erythema.   Neurological:      Mental Status: She is alert and oriented to person, place, and time.   Psychiatric:         Behavior: Behavior normal.         Thought Content: Thought content normal.         Judgment: Judgment normal.           Current Outpatient Medications:   •  apixaban (ELIQUIS) 5 MG tablet tablet, Take 1 tablet by mouth 2 (two) times a day., Disp: 60 tablet, Rfl: 2  •  benazepril (LOTENSIN) 10 MG tablet, TAKE 1 TABLET BY MOUTH DAILY, Disp: 90 tablet, Rfl: 3  •  Calcium  Carb-Cholecalciferol (CALCIUM 1000 + D PO), Take 2 tablets by mouth Daily., Disp: , Rfl:   •  citalopram (CeleXA) 20 MG tablet, Take  by mouth Daily., Disp: , Rfl:   •  diphenhydrAMINE (BENADRYL) 50 MG capsule, Take 1 capsule 1 hour prior to test time by mouth., Disp: 1 capsule, Rfl: 0  •  docusate sodium (COLACE) 100 MG capsule, Take 1 capsule by mouth 2 (Two) Times a Day., Disp: 60 capsule, Rfl: 0  •  lidocaine (LIDODERM) 5 %, Place 1 patch on the skin as directed by provider Daily. Remove & Discard patch within 12 hours or as directed by MD, Disp: 30 patch, Rfl: 1  •  metFORMIN (GLUCOPHAGE) 500 MG tablet, TAKE 1 TABLET BY MOUTH EVERY DAY, Disp: 90 tablet, Rfl: 3  •  Multiple Vitamin (MULTI VITAMIN DAILY PO), Take 1 tablet by mouth Daily., Disp: , Rfl:   •  pravastatin (PRAVACHOL) 40 MG tablet, TAKE 1 TABLET BY MOUTH DAILY, Disp: 90 tablet, Rfl: 0  •  traMADol (ULTRAM) 50 MG tablet, Take 1 tablet by mouth 2 (Two) Times a Day As Needed for Moderate Pain ., Disp: 60 tablet, Rfl: 2  •  TURMERIC PO, Take 1 tablet by mouth Daily., Disp: , Rfl:   •  warfarin (COUMADIN) 2.5 MG tablet, Take one tablet by mouth one day of the week - Monday, Disp: 30 tablet, Rfl: 3  •  warfarin (COUMADIN) 5 MG tablet, To take 5 mg po 6 days of the week (Tues, Wed, Thurs, Fri, Sat, Sun), Disp: 30 tablet, Rfl: 3  •  amoxicillin (AMOXIL) 500 MG tablet, Take 1 tablet by mouth 2 (Two) Times a Day for 10 days., Disp: 20 tablet, Rfl: 0    Procedures    Office Visit on 09/22/2020   Component Date Value Ref Range Status   • INR 09/22/2020 2.6* 0.9 - 1.1 Final             Samira was seen today for earache and sore throat.    Diagnoses and all orders for this visit:    Tonsillitis  Patient with acute symptoms of right-sided throat pain, significant tonsillar swelling and erythema the posterior oropharynx on the right side.  Feel that symptoms are most likely secondary to tonsillitis.  We will start her on amoxicillin 500 mg twice daily.  Patient does  have follow-up with her ENT tomorrow to discuss right-sided ear pain which is a chronic issue.  -     amoxicillin (AMOXIL) 500 MG tablet; Take 1 tablet by mouth 2 (Two) Times a Day for 10 days.    Long term current use of anticoagulant therapy  Patient's INR today was within goal at 2.6.  Recommended she continue the warfarin without changes, will cancel prescription for Eliquis.  -     POCT INR    Memory difficulty  Patient discussed symptoms of memory difficulties that her family in particular concerned about.  This is something she has not discussed with me before in the past.  I have felt that during her visits her cognitive function is within normal limits.  Have had some concerns with her INR though that she may be missing doses of her warfarin.  Discussed with patient that further evaluation will likely require neuropsychiatric testing.  We will get patient set up to see neurology to discuss further.  -     Ambulatory Referral to Neurology      Return if symptoms worsen or fail to improve.      Anne Leiva MD

## 2020-10-26 ENCOUNTER — ANTICOAGULATION VISIT (OUTPATIENT)
Dept: FAMILY MEDICINE CLINIC | Facility: CLINIC | Age: 75
End: 2020-10-26

## 2020-10-26 ENCOUNTER — FLU SHOT (OUTPATIENT)
Dept: FAMILY MEDICINE CLINIC | Facility: CLINIC | Age: 75
End: 2020-10-26

## 2020-10-26 DIAGNOSIS — Z23 NEED FOR IMMUNIZATION AGAINST INFLUENZA: Primary | ICD-10-CM

## 2020-10-26 LAB — INR PPP: 2.7 (ref 0.9–1.1)

## 2020-10-26 PROCEDURE — G0008 ADMIN INFLUENZA VIRUS VAC: HCPCS | Performed by: FAMILY MEDICINE

## 2020-10-26 PROCEDURE — 90694 VACC AIIV4 NO PRSRV 0.5ML IM: CPT | Performed by: FAMILY MEDICINE

## 2020-10-26 PROCEDURE — 36416 COLLJ CAPILLARY BLOOD SPEC: CPT | Performed by: FAMILY MEDICINE

## 2020-10-26 PROCEDURE — 85610 PROTHROMBIN TIME: CPT | Performed by: FAMILY MEDICINE

## 2020-11-04 RX ORDER — PRAVASTATIN SODIUM 40 MG
TABLET ORAL
Qty: 90 TABLET | Refills: 2 | Status: SHIPPED | OUTPATIENT
Start: 2020-11-04 | End: 2021-08-09

## 2020-12-01 ENCOUNTER — OFFICE VISIT (OUTPATIENT)
Dept: FAMILY MEDICINE CLINIC | Facility: CLINIC | Age: 75
End: 2020-12-01

## 2020-12-01 VITALS
HEIGHT: 61 IN | HEART RATE: 66 BPM | DIASTOLIC BLOOD PRESSURE: 82 MMHG | BODY MASS INDEX: 30.21 KG/M2 | OXYGEN SATURATION: 99 % | TEMPERATURE: 96.9 F | WEIGHT: 160 LBS | SYSTOLIC BLOOD PRESSURE: 126 MMHG

## 2020-12-01 DIAGNOSIS — G62.9 NEUROPATHY: ICD-10-CM

## 2020-12-01 DIAGNOSIS — Z79.01 LONG TERM CURRENT USE OF ANTICOAGULANT THERAPY: Primary | ICD-10-CM

## 2020-12-01 DIAGNOSIS — M54.50 CHRONIC LOW BACK PAIN WITHOUT SCIATICA, UNSPECIFIED BACK PAIN LATERALITY: ICD-10-CM

## 2020-12-01 DIAGNOSIS — G89.29 CHRONIC LOW BACK PAIN WITHOUT SCIATICA, UNSPECIFIED BACK PAIN LATERALITY: ICD-10-CM

## 2020-12-01 LAB — INR PPP: 2.5 (ref 2–3)

## 2020-12-01 PROCEDURE — 85610 PROTHROMBIN TIME: CPT | Performed by: FAMILY MEDICINE

## 2020-12-01 PROCEDURE — 36416 COLLJ CAPILLARY BLOOD SPEC: CPT | Performed by: FAMILY MEDICINE

## 2020-12-01 PROCEDURE — 99213 OFFICE O/P EST LOW 20 MIN: CPT | Performed by: FAMILY MEDICINE

## 2020-12-01 RX ORDER — TRAMADOL HYDROCHLORIDE 100 MG/1
100 TABLET, COATED ORAL EVERY 6 HOURS PRN
Status: CANCELLED | OUTPATIENT
Start: 2020-12-01

## 2020-12-01 RX ORDER — BACLOFEN 5 MG/1
5 TABLET ORAL 2 TIMES DAILY PRN
Qty: 60 TABLET | Refills: 1 | Status: SHIPPED | OUTPATIENT
Start: 2020-12-01 | End: 2020-12-29

## 2020-12-01 RX ORDER — SODIUM PHOSPHATE,MONO-DIBASIC 19G-7G/118
ENEMA (ML) RECTAL
COMMUNITY

## 2020-12-01 RX ORDER — TRAMADOL HYDROCHLORIDE 50 MG/1
100 TABLET ORAL 2 TIMES DAILY PRN
Qty: 120 TABLET | Refills: 1 | Status: SHIPPED | OUTPATIENT
Start: 2020-12-01 | End: 2021-04-29 | Stop reason: SDUPTHER

## 2020-12-01 NOTE — PROGRESS NOTES
Samira Workman is a 75 y.o. female.     Chief Complaint   Patient presents with   • Anticoagulation     pt is following up on INR - needs poct today   • Back Pain     pt would like to discuss changing her pain therapy for thoracic and lumbar pain      Masks/face shield/appropriate PPE were worn for the entirety of the visit by the patient, MA, and provider.     HPI     Pt is a pleasant 75 y.o. YO female here for follow-up of anticoagulation on Coumadin as well as for chronic back pain. She is also due for an A1C check for her type two diabetes.    Anticoagulation secondary to atrial fibrillation -on therapy with Coumadin, due for INR check.  Her last INR was within the goal range at 2.7.  She currently takes 5 mg of Coumadin 6 days of the week and 2.5 mg 1 day of the week.    Worsening Lower back Pain -patient has had chronic lower back issues for a long time, had to undergo significant surgery when she was in her 30s.  He has had epidural injections in the past.  She has been having some worsening lumbar back pain over the last year and a half.  It has gotten to the point that she has significant pain with most movements.  Pain is bilateral, feels it more on the sides of her back rather than midline.  Occasionally will radiate down the leg.  She does take tramadol 50 mg twice daily which was initially started for the neuropathy in her feet but does not feel that this is controlling the pain she has to the level needed.  She does also take extra strength Tylenol regularly during the day.  Patient does not want to undergo any type of surgery or procedure for her back including further epidurals.  Wants to manage his pain through medication and therapy if indicated.     The following portions of the patient's history were reviewed by me and updated as appropriate: allergies, current medications, past family history, past medical history, past social history, past surgical history, and problem list.     Review of  Systems   Constitutional: Negative.    HENT: Negative.    Eyes: Negative.    Respiratory: Negative.    Cardiovascular: Negative.  Negative for chest pain, palpitations and leg swelling.   Gastrointestinal: Negative.    Endocrine: Negative.    Genitourinary: Negative.    Musculoskeletal: Positive for back pain.   Skin: Negative.    Allergic/Immunologic: Negative.    Neurological: Negative.    Hematological: Negative.    Psychiatric/Behavioral: Negative.    I have reviewed and confirmed the accuracy of the ROS as documented by the MA/LPN/RN Anne Leiva MD    Objective  Vitals:    12/01/20 1241   BP: 126/82   Pulse: 66   Temp: 96.9 °F (36.1 °C)   SpO2: 99%     Body mass index is 30.23 kg/m².       Physical Exam  Vitals signs reviewed.   Constitutional:       General: She is not in acute distress.     Appearance: She is well-developed.   HENT:      Head: Normocephalic and atraumatic.   Eyes:      General: No scleral icterus.        Right eye: No discharge.         Left eye: No discharge.      Conjunctiva/sclera: Conjunctivae normal.   Pulmonary:      Effort: Pulmonary effort is normal. No respiratory distress.   Musculoskeletal:      Comments: Large scar over the midline portion of her lumbar back which she had surgery.  Patient shows that pain is bilateral lumbar back, no significant tenderness to palpation.   Skin:     Coloration: Skin is not pale.      Findings: No erythema.   Neurological:      Mental Status: She is alert and oriented to person, place, and time.   Psychiatric:         Behavior: Behavior normal.         Thought Content: Thought content normal.         Judgment: Judgment normal.           Current Outpatient Medications:   •  benazepril (LOTENSIN) 10 MG tablet, TAKE 1 TABLET BY MOUTH DAILY, Disp: 90 tablet, Rfl: 3  •  Calcium Carb-Cholecalciferol (CALCIUM 1000 + D PO), Take 2 tablets by mouth Daily., Disp: , Rfl:   •  citalopram (CeleXA) 20 MG tablet, Take  by mouth Daily., Disp: , Rfl:   •   docusate sodium (COLACE) 100 MG capsule, Take 1 capsule by mouth 2 (Two) Times a Day., Disp: 60 capsule, Rfl: 0  •  glucosamine-chondroitin 500-400 MG capsule capsule, Take  by mouth 3 (Three) Times a Day With Meals., Disp: , Rfl:   •  lidocaine (LIDODERM) 5 %, Place 1 patch on the skin as directed by provider Daily. Remove & Discard patch within 12 hours or as directed by MD, Disp: 30 patch, Rfl: 1  •  metFORMIN (GLUCOPHAGE) 500 MG tablet, TAKE 1 TABLET BY MOUTH EVERY DAY, Disp: 90 tablet, Rfl: 3  •  Multiple Vitamin (MULTI VITAMIN DAILY PO), Take 1 tablet by mouth Daily., Disp: , Rfl:   •  pravastatin (PRAVACHOL) 40 MG tablet, TAKE 1 TABLET BY MOUTH DAILY, Disp: 90 tablet, Rfl: 2  •  traMADol (ULTRAM) 50 MG tablet, Take 2 tablets by mouth 2 (Two) Times a Day As Needed for Moderate Pain ., Disp: 120 tablet, Rfl: 1  •  TURMERIC PO, Take 1 tablet by mouth Daily., Disp: , Rfl:   •  warfarin (COUMADIN) 2.5 MG tablet, Take one tablet by mouth one day of the week - Monday, Disp: 30 tablet, Rfl: 3  •  warfarin (COUMADIN) 5 MG tablet, To take 5 mg po 6 days of the week (Tues, Wed, Thurs, Fri, Sat, Sun), Disp: 30 tablet, Rfl: 3  •  baclofen 5 MG tablet, Take 5 mg by mouth 2 (Two) Times a Day As Needed for Muscle Spasms. T, Disp: 60 tablet, Rfl: 1  •  diphenhydrAMINE (BENADRYL) 50 MG capsule, Take 1 capsule 1 hour prior to test time by mouth., Disp: 1 capsule, Rfl: 0    Procedures    Office Visit on 12/01/2020   Component Date Value Ref Range Status   • INR 12/01/2020 2.5* 2.0 - 3.0 Final    pt takes 2.5 mg Mondays and 5 mg all other days              Diagnoses and all orders for this visit:    1. Long term current use of anticoagulant therapy (Primary)  Chronic, current INR is within goal range at 2.5.  Patient will need to return in 4 weeks to repeat this.  -     POCT INR    2. Chronic low back pain without sciatica, unspecified back pain laterality  Patient with chronic low back pain that has been gradually worsening over  the last year and a half.  Does not wish to pursue any type of surgical management or procedures for it.  Will trial increasing her tramadol to 100 mg twice daily, will also have her try a muscle relaxant for the pain as I think some of it is related to paraspinal muscle spasms.  Lastly will refer to physical therapy which I think she may benefit from.  Will not pursue further imaging at this time as I do not think it would change treatment plan given patient's decision to not do any type of surgery or procedure.  Noe reviewed and appropriate.  -     traMADol (ULTRAM) 50 MG tablet; Take 2 tablets by mouth 2 (Two) Times a Day As Needed for Moderate Pain .  Dispense: 120 tablet; Refill: 1  -     baclofen 5 MG tablet; Take 5 mg by mouth 2 (Two) Times a Day As Needed for Muscle Spasms. T  Dispense: 60 tablet; Refill: 1  -     Ambulatory Referral to Physical Therapy Evaluate and treat    3. Neuropathy  -     traMADol (ULTRAM) 50 MG tablet; Take 2 tablets by mouth 2 (Two) Times a Day As Needed for Moderate Pain .  Dispense: 120 tablet; Refill: 1      Return in about 3 months (around 3/1/2021) for Recheck.      Anne Leiva MD

## 2020-12-29 ENCOUNTER — ANTICOAGULATION VISIT (OUTPATIENT)
Dept: FAMILY MEDICINE CLINIC | Facility: CLINIC | Age: 75
End: 2020-12-29

## 2020-12-29 LAB — INR PPP: 2.6 (ref 0.9–1.1)

## 2020-12-29 PROCEDURE — 36416 COLLJ CAPILLARY BLOOD SPEC: CPT | Performed by: FAMILY MEDICINE

## 2020-12-29 PROCEDURE — 85610 PROTHROMBIN TIME: CPT | Performed by: FAMILY MEDICINE

## 2020-12-29 RX ORDER — CYCLOBENZAPRINE HCL 10 MG
10 TABLET ORAL 3 TIMES DAILY PRN
Qty: 30 TABLET | Refills: 0 | Status: SHIPPED | OUTPATIENT
Start: 2020-12-29 | End: 2022-04-29

## 2020-12-29 NOTE — TELEPHONE ENCOUNTER
Patient came into for appt for her inr check she mentioned that she is having neck pain and the baclofen she was prescribed for her back at the beginning of the month dosent seem to be helping she is was told by her daughter who is a nurse that cyclobenzaprine might work better she would like to see if Dr. Shin will prescribe that for her. She did mention she has noticed numbness and tingling in her hands. She has a f/u appt in Feb she is currently checking to see if that appt can be moved sooner as she was told she would need to f/u with appointment to discuss new problems that are occurring prescription is pending

## 2021-01-06 ENCOUNTER — OFFICE VISIT (OUTPATIENT)
Dept: NEUROLOGY | Facility: CLINIC | Age: 76
End: 2021-01-06

## 2021-01-06 ENCOUNTER — LAB (OUTPATIENT)
Dept: LAB | Facility: HOSPITAL | Age: 76
End: 2021-01-06

## 2021-01-06 VITALS
HEART RATE: 69 BPM | WEIGHT: 159 LBS | DIASTOLIC BLOOD PRESSURE: 70 MMHG | SYSTOLIC BLOOD PRESSURE: 116 MMHG | OXYGEN SATURATION: 98 % | HEIGHT: 61 IN | BODY MASS INDEX: 30.02 KG/M2

## 2021-01-06 DIAGNOSIS — R41.3 MEMORY LOSS: Primary | ICD-10-CM

## 2021-01-06 DIAGNOSIS — G47.33 OBSTRUCTIVE SLEEP APNEA: ICD-10-CM

## 2021-01-06 LAB — VIT B12 BLD-MCNC: 1146 PG/ML (ref 211–946)

## 2021-01-06 PROCEDURE — 82607 VITAMIN B-12: CPT | Performed by: PSYCHIATRY & NEUROLOGY

## 2021-01-06 PROCEDURE — 99204 OFFICE O/P NEW MOD 45 MIN: CPT | Performed by: PSYCHIATRY & NEUROLOGY

## 2021-01-06 PROCEDURE — 36415 COLL VENOUS BLD VENIPUNCTURE: CPT | Performed by: PSYCHIATRY & NEUROLOGY
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
## 2021-01-06 NOTE — PATIENT INSTRUCTIONS
**Eat a high fiber diet    **Exercise regularly (physically and mentally)    **Floss daily    **Consider eating yogurt regularly    **Consider drinking green tea    **Limit soda and alcohol    **Ensure safety in the home    **Check out th Alzheimer's Association (www.alz.org).    **If you are interested in participating in a clinical trial, check out the following centers:   [facility]:  [email or web address]      [facility]:  [email or web address]/[email or web address]     [facility]:  [email or web address]/adrcweb/    **If you live in [facility], consider [facility]. It is a free agency that helps find placement for seniors. They do an assessment and find out the need and know which facilities have openings and would be the best fit. They help figure out the financial aspects as well.  Jodee Rogers is the nurse navigator and her number is [phone].    **Will check B12 level today.  Schedule home sleep study.  Talk to your prescribing providers about using alternatives to Flexeril and tramadol.  No driving until assessed by the [place] [employer] program.**

## 2021-01-20 ENCOUNTER — PRIOR AUTHORIZATION (OUTPATIENT)
Dept: FAMILY MEDICINE CLINIC | Facility: CLINIC | Age: 76
End: 2021-01-20

## 2021-01-25 RX ORDER — WARFARIN SODIUM 5 MG/1
TABLET ORAL
Qty: 30 TABLET | Refills: 3 | Status: SHIPPED | OUTPATIENT
Start: 2021-01-25 | End: 2021-08-03 | Stop reason: SDUPTHER

## 2021-02-02 ENCOUNTER — ANTICOAGULATION VISIT (OUTPATIENT)
Dept: FAMILY MEDICINE CLINIC | Facility: CLINIC | Age: 76
End: 2021-02-02

## 2021-02-02 DIAGNOSIS — Z79.01 LONG TERM CURRENT USE OF ANTICOAGULANT THERAPY: Primary | ICD-10-CM

## 2021-02-02 LAB — INR PPP: 2.8 (ref 0.9–1.1)

## 2021-02-02 PROCEDURE — 85610 PROTHROMBIN TIME: CPT | Performed by: FAMILY MEDICINE

## 2021-02-02 PROCEDURE — 36416 COLLJ CAPILLARY BLOOD SPEC: CPT | Performed by: FAMILY MEDICINE

## 2021-02-08 ENCOUNTER — PRIOR AUTHORIZATION (OUTPATIENT)
Dept: FAMILY MEDICINE CLINIC | Facility: CLINIC | Age: 76
End: 2021-02-08

## 2021-02-08 NOTE — TELEPHONE ENCOUNTER
PA sent to plan 2/8/21 for cyclobenzaprine   Available w/o PA per cover my meds - pharmacy notified

## 2021-02-15 RX ORDER — CITALOPRAM 20 MG/1
TABLET ORAL
Qty: 90 TABLET | Refills: 0 | Status: SHIPPED | OUTPATIENT
Start: 2021-02-15 | End: 2021-05-12

## 2021-03-25 RX ORDER — BENAZEPRIL HYDROCHLORIDE 10 MG/1
TABLET ORAL
Qty: 90 TABLET | Refills: 3 | Status: SHIPPED | OUTPATIENT
Start: 2021-03-25 | End: 2022-03-16 | Stop reason: SDUPTHER

## 2021-04-12 ENCOUNTER — OFFICE VISIT (OUTPATIENT)
Dept: FAMILY MEDICINE CLINIC | Facility: CLINIC | Age: 76
End: 2021-04-12

## 2021-04-12 VITALS
HEART RATE: 88 BPM | SYSTOLIC BLOOD PRESSURE: 122 MMHG | TEMPERATURE: 97.6 F | OXYGEN SATURATION: 100 % | BODY MASS INDEX: 30.21 KG/M2 | WEIGHT: 160 LBS | HEIGHT: 61 IN | DIASTOLIC BLOOD PRESSURE: 76 MMHG

## 2021-04-12 DIAGNOSIS — R20.0 HAND NUMBNESS: ICD-10-CM

## 2021-04-12 DIAGNOSIS — G62.9 NEUROPATHY: ICD-10-CM

## 2021-04-12 DIAGNOSIS — E11.8 CONTROLLED TYPE 2 DIABETES MELLITUS WITH COMPLICATION, WITHOUT LONG-TERM CURRENT USE OF INSULIN (HCC): ICD-10-CM

## 2021-04-12 DIAGNOSIS — I48.0 PAROXYSMAL ATRIAL FIBRILLATION (HCC): ICD-10-CM

## 2021-04-12 DIAGNOSIS — Z79.01 LONG TERM CURRENT USE OF ANTICOAGULANT THERAPY: Primary | ICD-10-CM

## 2021-04-12 LAB
HBA1C MFR BLD: 6.2 %
INR PPP: 0 (ref 0–0)
INR PPP: 2.3 (ref 0.9–1.1)

## 2021-04-12 PROCEDURE — 36416 COLLJ CAPILLARY BLOOD SPEC: CPT | Performed by: FAMILY MEDICINE

## 2021-04-12 PROCEDURE — 85610 PROTHROMBIN TIME: CPT | Performed by: FAMILY MEDICINE

## 2021-04-12 PROCEDURE — 83036 HEMOGLOBIN GLYCOSYLATED A1C: CPT | Performed by: FAMILY MEDICINE

## 2021-04-12 PROCEDURE — 99213 OFFICE O/P EST LOW 20 MIN: CPT | Performed by: FAMILY MEDICINE

## 2021-04-12 RX ORDER — MULTIPLE VITAMINS W/ MINERALS TAB 9MG-400MCG
TAB ORAL DAILY
COMMUNITY
End: 2021-04-12 | Stop reason: SDUPTHER

## 2021-04-12 NOTE — PROGRESS NOTES
Samira Workman is a 76 y.o. female.     Chief Complaint   Patient presents with   • Anticoagulation     INR fu   • Peripheral Neuropathy     pt thinks she has developed neuropathy in bilateral hands     Masks/face shield/appropriate PPE were worn for the entirety of the visit by the patient, MA, and provider.     HPI     Pt is a pleasant 76 y.o. YO female here for follow-up of INR and type 2 diabetes.  She also has concerns for the development of neuropathy in her hands.    Anticoagulation check -patient is on chronic anticoagulation with warfarin secondary to atrial fibrillation.  Her goal INR is 2.5-3.5.  Her last INR was within goal at 2.8.  She currently takes Coumadin 5 mg six days of the week, 2.5 mg one day of the week.    Type 2 diabetes -chronic, has been well controlled on oral therapy with Metformin 500 mg daily.  She is overdue for an A1c check.  Her last A1c was about 6 months ago and it was 6.2 at that time.  Foot exam up-to-date, performed 5/26/2020.    Neuropathy - over the last several months patient has developed persistent numbness in both hands. Numbness involves all five digits in both hands and describes a general pain or discomfort of the hands, does not radiate up the arms. Worse at night compared to daytime.  Also feels hands are weaker,  strength diminished, has started dropping things. Denies any wrist or neck pain. Does have history of bilateral shoulder issues/rotator cuff issues and has been doing a lot of home PT stretches/exercises for this since symptoms started.     The following portions of the patient's history were reviewed by me and updated as appropriate: allergies, current medications, past family history, past medical history, past social history, past surgical history, and problem list.     Review of Systems   Constitutional: Negative.    HENT: Negative.    Eyes: Negative.    Respiratory: Negative.    Cardiovascular: Negative.    Gastrointestinal: Negative.    Endocrine:  Negative.    Genitourinary: Negative.    Musculoskeletal: Positive for arthralgias.   Skin: Negative.    Allergic/Immunologic: Negative.    Neurological: Positive for numbness.   Hematological: Negative.    Psychiatric/Behavioral: Negative.    I have reviewed and confirmed the accuracy of the ROS as documented by the MA/LPN/RN Anne Leiva MD      Objective  Vitals:    04/12/21 1246   BP: 122/76   Pulse: 88   Temp: 97.6 °F (36.4 °C)   SpO2: 100%     Body mass index is 30.25 kg/m².       Physical Exam  Vitals reviewed.   Constitutional:       General: She is not in acute distress.     Appearance: She is well-developed.   HENT:      Head: Normocephalic and atraumatic.   Eyes:      General: No scleral icterus.        Right eye: No discharge.         Left eye: No discharge.      Conjunctiva/sclera: Conjunctivae normal.   Pulmonary:      Effort: Pulmonary effort is normal. No respiratory distress.   Skin:     Coloration: Skin is not pale.      Findings: No erythema.   Neurological:      Mental Status: She is alert and oriented to person, place, and time.   Psychiatric:         Behavior: Behavior normal.         Thought Content: Thought content normal.         Judgment: Judgment normal.           Current Outpatient Medications:   •  benazepril (LOTENSIN) 10 MG tablet, TAKE 1 TABLET BY MOUTH DAILY, Disp: 90 tablet, Rfl: 3  •  Calcium Carb-Cholecalciferol (CALCIUM 1000 + D PO), Take 2 tablets by mouth Daily., Disp: , Rfl:   •  citalopram (CeleXA) 20 MG tablet, TAKE 1 TABLET BY MOUTH EVERY DAY, Disp: 90 tablet, Rfl: 0  •  glucosamine-chondroitin 500-400 MG capsule capsule, Take  by mouth 3 (Three) Times a Day With Meals., Disp: , Rfl:   •  metFORMIN (GLUCOPHAGE) 500 MG tablet, TAKE 1 TABLET BY MOUTH EVERY DAY, Disp: 90 tablet, Rfl: 1  •  Multiple Vitamin (MULTI VITAMIN DAILY PO), Take 1 tablet by mouth Daily., Disp: , Rfl:   •  pravastatin (PRAVACHOL) 40 MG tablet, TAKE 1 TABLET BY MOUTH DAILY, Disp: 90 tablet, Rfl:  2  •  traMADol (ULTRAM) 50 MG tablet, Take 2 tablets by mouth 2 (Two) Times a Day As Needed for Moderate Pain ., Disp: 120 tablet, Rfl: 1  •  TURMERIC PO, Take 1 tablet by mouth Daily., Disp: , Rfl:   •  warfarin (COUMADIN) 2.5 MG tablet, Take one tablet by mouth one day of the week - Monday, Disp: 30 tablet, Rfl: 3  •  warfarin (COUMADIN) 5 MG tablet, TAKE 1 TABLET BY MOUTH FOUR TIMES A WEEK(TUESDAY, THURSDAY, SATURDAY& SUNDAY) (Patient taking differently: 5 mg. TAKE 1 TABLET BY MOUTH 6 times a week), Disp: 30 tablet, Rfl: 3  •  cyclobenzaprine (FLEXERIL) 10 MG tablet, Take 1 tablet by mouth 3 (Three) Times a Day As Needed for Muscle Spasms., Disp: 30 tablet, Rfl: 0  •  lidocaine (LIDODERM) 5 %, Place 1 patch on the skin as directed by provider Daily. Remove & Discard patch within 12 hours or as directed by MD, Disp: 30 patch, Rfl: 1    Procedures  Office Visit on 04/12/2021   Component Date Value Ref Range Status   • INR 04/12/2021 2.3* 0.9 - 1.1 Final   • Hemoglobin A1C 04/12/2021 6.2  % Final   • INR 04/12/2021 6.2* 0.9 - 1.1 Final           Diagnoses and all orders for this visit:    Long term current use of anticoagulant therapy, Paroxysmal atrial fibrillation (CMS/HCC)  Chronic, INR level slightly low today but will continue coumadin at current dosing regimen, no changes as she has generally been stable on it- recheck in 6 weeks.   -     POCT INR    Neuropathy  New problem of bilateral hand numbness and weakness.  Concern for joint related etiology, possible nerve compression in the shoulders or neck.  Shoulder seem the most likely candidate given her long-term shoulder issues as well as recently increased activity with home exercises and stretches.  Encouraged her to stop this particular exercise regimen, she is still going to try and be active.  We will also work to get her set up for nerve conduction study so that we can hopefully localize an area of compression.  Of course general neuropathy is also a  possibility, risk factor for her includes type 2 diabetes although it has been well controlled consistently.  -Nerve conduction study.    Controlled type 2 diabetes mellitus with complication, without long-term current use of insulin (CMS/[facility])  Chronic, A1c today 6.2 which demonstrates good control on current regimen of metformin 500 mg daily.  Continue same without change.      Return in about 4 weeks (around 5/10/2021) for INR CHECK - NURSE VISIT - Follow up 3 months - office visit for diabetes.      Anne Leiva MD

## 2021-04-26 ENCOUNTER — TELEPHONE (OUTPATIENT)
Dept: FAMILY MEDICINE CLINIC | Facility: CLINIC | Age: 76
End: 2021-04-26

## 2021-04-26 DIAGNOSIS — M79.673 PAIN OF FOOT, UNSPECIFIED LATERALITY: Primary | ICD-10-CM
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
## 2021-04-27 ENCOUNTER — TELEPHONE (OUTPATIENT)
Dept: FAMILY MEDICINE CLINIC | Facility: CLINIC | Age: 76
End: 2021-04-27

## 2021-04-27 NOTE — TELEPHONE ENCOUNTER
If patient is having these new symptoms then she needs evaluated to determine what appropriate imaging will be.  She likely will need to x-ray before a CT scan or MRI is possible.  I believe my schedule is full today but she could be worked in later this week.  Otherwise I would advise her to go to an urgent care or ER for further evaluation.

## 2021-04-27 NOTE — TELEPHONE ENCOUNTER
I told her to call her Dr. Wilkerson's office but she states that she does not like that office. Please place a new referral.

## 2021-04-27 NOTE — TELEPHONE ENCOUNTER
Pt stated that she had XRAYs done with Dr. Wilkerson for her foot. He is a [facility] (notes are in the chart)     And she does not wish to go back there. Dr. Wilkerson did order physical thearpy for her and she did it for 4 weeks but it is actually making it worse, she stated the pain in deep inside her foot and it is worse at night.     Please Advise

## 2021-04-27 NOTE — TELEPHONE ENCOUNTER
If this is the case then patient needs to see either a different orthopedist or podiatrist.  If we are expecting imaging to be abnormal then she will need follow-up with them regardless and I think will be better for them to order the imaging they feel is most appropriate.  If she needs a new referral I would be happy to place it.

## 2021-04-27 NOTE — TELEPHONE ENCOUNTER
Caller: Samira Workman    Relationship: Self    Best call back number: [phone]    What form or medical record are you requesting: HANDICAP PARKING TAG    How would you like to receive the form or medical records (pick-up, mail, fax):       Timeframe paperwork needed: ASAP    Additional notes: THE PATIENT STATES THAT SHE WOULD LIKE TO HAVE A DISABILITY FORM FILLED OUT SO THAT SHE CAN BE CONSIDERED ELIGIBLE FOR A HANDICAP PARKING TAG.

## 2021-04-28 NOTE — TELEPHONE ENCOUNTER
Pt called again, she made appt with Dr So to discuss this and a couple of other things. Please disregard for the time being. Thank you

## 2021-04-28 NOTE — TELEPHONE ENCOUNTER
Pt very upset she did not receive a call back in regards to this. I did inform her the referral was sent to [facility] and they will call her to schedule.

## 2021-04-29 ENCOUNTER — OFFICE VISIT (OUTPATIENT)
Dept: FAMILY MEDICINE CLINIC | Facility: CLINIC | Age: 76
End: 2021-04-29

## 2021-04-29 VITALS
HEIGHT: 61 IN | WEIGHT: 164 LBS | TEMPERATURE: 96.9 F | BODY MASS INDEX: 30.96 KG/M2 | HEART RATE: 68 BPM | OXYGEN SATURATION: 98 % | SYSTOLIC BLOOD PRESSURE: 126 MMHG | DIASTOLIC BLOOD PRESSURE: 80 MMHG

## 2021-04-29 DIAGNOSIS — M54.50 CHRONIC LOW BACK PAIN WITHOUT SCIATICA, UNSPECIFIED BACK PAIN LATERALITY: ICD-10-CM

## 2021-04-29 DIAGNOSIS — G62.9 NEUROPATHY: ICD-10-CM

## 2021-04-29 DIAGNOSIS — G89.29 CHRONIC LOW BACK PAIN WITHOUT SCIATICA, UNSPECIFIED BACK PAIN LATERALITY: ICD-10-CM

## 2021-04-29 DIAGNOSIS — M79.671 RIGHT FOOT PAIN: Primary | ICD-10-CM

## 2021-04-29 DIAGNOSIS — M79.89 SWELLING OF RIGHT FOOT: ICD-10-CM

## 2021-04-29 PROCEDURE — 99213 OFFICE O/P EST LOW 20 MIN: CPT | Performed by: FAMILY MEDICINE

## 2021-04-29 RX ORDER — TRAMADOL HYDROCHLORIDE 50 MG/1
100 TABLET ORAL 2 TIMES DAILY PRN
Qty: 120 TABLET | Refills: 1 | Status: SHIPPED | OUTPATIENT
Start: 2021-04-29 | End: 2022-04-29 | Stop reason: SDUPTHER

## 2021-04-29 NOTE — PROGRESS NOTES
Samira Workman is a 76 y.o. female.     Chief Complaint   Patient presents with   • Peripheral Neuropathy     pt is requesting tramadol refill    • Foot Pain     pt wants to ask if she can take alpha lipoic acid     Masks/face shield/appropriate PPE were worn for the entirety of the visit by the patient, MA, and provider.     HPI     Pt is a pleasant 76 y.o. YO female here for worsening right foot pain. Patient has chronic neuropathy that causes pain in both feet but she has been experiencing severe, sharp pains in her right foot for almost 6 weeks as well as swelling. She had a x-ray performed at an outside physician office which demonstrated some arthritis but nothing else. She has been doing regular physical therapy and the pain has actually worsened with this. She has an appointment with orthopedics but it is not for another several weeks. Her activities are now extremely limited secondary to the foot pain, she can only walk very short distances and is having to cancel an upcoming vacation because of it.    For her neuropathy we have trialed both pregabalin and gabapentin but patient has had negative reactions to both. Takes tramadol on a as needed basis for neuropathy symptoms as well as chronic back pain and needs a refill of this.    The following portions of the patient's history were reviewed by me and updated as appropriate: allergies, current medications, past family history, past medical history, past social history, past surgical history, and problem list.     Review of Systems   Constitutional: Negative.    HENT: Negative.    Eyes: Negative.    Respiratory: Negative.    Cardiovascular: Negative.    Gastrointestinal: Negative.    Endocrine: Negative.    Genitourinary: Negative.    Musculoskeletal: Positive for arthralgias and gait problem.   Skin: Negative.    Allergic/Immunologic: Negative.    Hematological: Negative.    Psychiatric/Behavioral: Negative.    I have reviewed and confirmed the accuracy of  the ROS as documented by the MA/LPN/RN Anne Leiva MD    Objective  Vitals:    04/29/21 1031   BP: 126/80   Pulse: 68   Temp: 96.9 °F (36.1 °C)   SpO2: 98%     Body mass index is 31 kg/m².       Physical Exam  Vitals reviewed.   Constitutional:       General: She is not in acute distress.     Appearance: She is well-developed.   HENT:      Head: Normocephalic and atraumatic.   Eyes:      General: No scleral icterus.        Right eye: No discharge.         Left eye: No discharge.      Conjunctiva/sclera: Conjunctivae normal.   Pulmonary:      Effort: Pulmonary effort is normal. No respiratory distress.   Skin:     Coloration: Skin is not pale.      Findings: No erythema.   Neurological:      Mental Status: She is alert and oriented to person, place, and time.   Psychiatric:         Behavior: Behavior normal.         Thought Content: Thought content normal.         Judgment: Judgment normal.           Current Outpatient Medications:   •  benazepril (LOTENSIN) 10 MG tablet, TAKE 1 TABLET BY MOUTH DAILY, Disp: 90 tablet, Rfl: 3  •  Calcium Carb-Cholecalciferol (CALCIUM 1000 + D PO), Take 2 tablets by mouth Daily., Disp: , Rfl:   •  citalopram (CeleXA) 20 MG tablet, TAKE 1 TABLET BY MOUTH EVERY DAY, Disp: 90 tablet, Rfl: 0  •  glucosamine-chondroitin 500-400 MG capsule capsule, Take  by mouth 3 (Three) Times a Day With Meals., Disp: , Rfl:   •  metFORMIN (GLUCOPHAGE) 500 MG tablet, TAKE 1 TABLET BY MOUTH EVERY DAY, Disp: 90 tablet, Rfl: 1  •  Multiple Vitamin (MULTI VITAMIN DAILY PO), Take 1 tablet by mouth Daily., Disp: , Rfl:   •  pravastatin (PRAVACHOL) 40 MG tablet, TAKE 1 TABLET BY MOUTH DAILY, Disp: 90 tablet, Rfl: 2  •  TURMERIC PO, Take 1 tablet by mouth Daily., Disp: , Rfl:   •  warfarin (COUMADIN) 2.5 MG tablet, Take one tablet by mouth one day of the week - Monday, Disp: 30 tablet, Rfl: 3  •  warfarin (COUMADIN) 5 MG tablet, TAKE 1 TABLET BY MOUTH FOUR TIMES A WEEK(TUESDAY, THURSDAY, SATURDAY& SUNDAY)  (Patient taking differently: 5 mg. TAKE 1 TABLET BY MOUTH 6 times a week), Disp: 30 tablet, Rfl: 3  •  cyclobenzaprine (FLEXERIL) 10 MG tablet, Take 1 tablet by mouth 3 (Three) Times a Day As Needed for Muscle Spasms., Disp: 30 tablet, Rfl: 0  •  lidocaine (LIDODERM) 5 %, Place 1 patch on the skin as directed by provider Daily. Remove & Discard patch within 12 hours or as directed by MD, Disp: 30 patch, Rfl: 1  •  traMADol (ULTRAM) 50 MG tablet, Take 2 tablets by mouth 2 (Two) Times a Day As Needed for Moderate Pain ., Disp: 120 tablet, Rfl: 1    Procedures    Lab Results (most recent)     None              Diagnoses and all orders for this visit:    Right foot pain, Swelling of right foot  Acute right foot pain, has been present for 6 weeks and has worsened during this time, associated with swelling. X-ray nondiagnostic and patient is failed conservative management with physical therapy. Has a referral for orthopedics but not for several weeks. We will go ahead and get patient scheduled for an MRI for further evaluation of the foot and send in prescription for tramadol that she can use as needed for the pain. Tramadol also helps her with her neuropathic pain and chronic low back pain. Noe reviewed and appropriate. We will write letter for her airline regarding symptoms and need for cancellation of her flight.  -     MRI foot right wo contrast; Future    Neuropathy  -     traMADol (ULTRAM) 50 MG tablet; Take 2 tablets by mouth 2 (Two) Times a Day As Needed for Moderate Pain .    Chronic low back pain without sciatica, unspecified back pain laterality  -     traMADol (ULTRAM) 50 MG tablet; Take 2 tablets by mouth 2 (Two) Times a Day As Needed for Moderate Pain .      Return if symptoms worsen or fail to improve.      Anne Leiva MD

## 2021-05-12 DIAGNOSIS — F33.41 RECURRENT MAJOR DEPRESSION IN PARTIAL REMISSION (HCC): ICD-10-CM

## 2021-05-12 DIAGNOSIS — F41.9 ANXIETY: Primary | ICD-10-CM

## 2021-05-12 RX ORDER — CITALOPRAM 20 MG/1
TABLET ORAL
Qty: 90 TABLET | Refills: 3 | Status: SHIPPED | OUTPATIENT
Start: 2021-05-12 | End: 2022-04-29 | Stop reason: SDUPTHER

## 2021-05-13 ENCOUNTER — HOSPITAL ENCOUNTER (OUTPATIENT)
Dept: MRI IMAGING | Facility: HOSPITAL | Age: 76
Discharge: HOME | End: 2021-05-13
Admitting: FAMILY MEDICINE

## 2021-05-13 DIAGNOSIS — M79.671 RIGHT FOOT PAIN: ICD-10-CM

## 2021-05-13 DIAGNOSIS — M79.89 SWELLING OF RIGHT FOOT: ICD-10-CM

## 2021-05-13 PROCEDURE — 73718 MRI LOWER EXTREMITY W/O DYE: CPT

## 2021-05-17 ENCOUNTER — OFFICE VISIT (OUTPATIENT)
Dept: ORTHOPEDIC SURGERY | Facility: CLINIC | Age: 76
End: 2021-05-17

## 2021-05-17 VITALS — WEIGHT: 164.02 LBS | HEIGHT: 61 IN | BODY MASS INDEX: 30.97 KG/M2 | TEMPERATURE: 96.9 F

## 2021-05-17 DIAGNOSIS — M19.079 ARTHRITIS OF FOOT: ICD-10-CM

## 2021-05-17 DIAGNOSIS — S86.219A RUPTURE OF TIBIALIS ANTERIOR TENDON: ICD-10-CM

## 2021-05-17 DIAGNOSIS — R52 PAIN: Primary | ICD-10-CM

## 2021-05-17 PROCEDURE — 73630 X-RAY EXAM OF FOOT: CPT | Performed by: ORTHOPAEDIC SURGERY

## 2021-05-17 PROCEDURE — 99214 OFFICE O/P EST MOD 30 MIN: CPT | Performed by: ORTHOPAEDIC SURGERY

## 2021-05-17 NOTE — PROGRESS NOTES
New Patient Complaint      Patient: Samira Workman  YOB: 1945 76 y.o. female  Medical Record Number: [id number]    Chief Complaints: My ankle is sore    History of Present Illness: Patient reports several months of pain in the right midfoot with some swelling and discomfort and had a subsequent trip and fall around 5/11/2021.  She was seen by her PCP and sent for MRI and is here today for further evaluation the swelling and pain have subsided to some degree still with some mild aching pain over the anterior aspect of the right midfoot and some to the anterior ankle.  He does feel that she has somewhat of a foot slap when she walks.    She has a history of diabetes with some chronic numbness in her toes and is also on Coumadin for A. fib but says she has come off of it in the past for surgical procedures.         HPI    Allergies:   Allergies   Allergen Reactions   • Ciprofibrate Diarrhea   • Ciprofloxacin Diarrhea     Other reaction(s): GI Upset   • Contrast Dye Itching   • Iodinated Diagnostic Agents Itching   • Gabapentin Other (See Comments)     Severe dry mouth.  Severe dry mouth.  Severe dry mouth.   • Sulfa Antibiotics Itching       Medications:   Current Outpatient Medications on File Prior to Visit   Medication Sig   • benazepril (LOTENSIN) 10 MG tablet TAKE 1 TABLET BY MOUTH DAILY   • Calcium Carb-Cholecalciferol (CALCIUM 1000 + D PO) Take 2 tablets by mouth Daily.   • citalopram (CeleXA) 20 MG tablet TAKE 1 TABLET BY MOUTH EVERY DAY   • cyclobenzaprine (FLEXERIL) 10 MG tablet Take 1 tablet by mouth 3 (Three) Times a Day As Needed for Muscle Spasms.   • glucosamine-chondroitin 500-400 MG capsule capsule Take  by mouth 3 (Three) Times a Day With Meals.   • metFORMIN (GLUCOPHAGE) 500 MG tablet TAKE 1 TABLET BY MOUTH EVERY DAY   • Multiple Vitamin (MULTI VITAMIN DAILY PO) Take 1 tablet by mouth Daily.   • pravastatin (PRAVACHOL) 40 MG tablet TAKE 1 TABLET BY MOUTH DAILY   • traMADol (ULTRAM) 50 MG  tablet Take 2 tablets by mouth 2 (Two) Times a Day As Needed for Moderate Pain .   • TURMERIC PO Take 1 tablet by mouth Daily.   • warfarin (COUMADIN) 2.5 MG tablet Take one tablet by mouth one day of the week - Monday   • warfarin (COUMADIN) 5 MG tablet TAKE 1 TABLET BY MOUTH FOUR TIMES A WEEK(TUESDAY, THURSDAY, SATURDAY& SUNDAY) (Patient taking differently: 5 mg. TAKE 1 TABLET BY MOUTH 6 times a week)   • lidocaine (LIDODERM) 5 % Place 1 patch on the skin as directed by provider Daily. Remove & Discard patch within 12 hours or as directed by MD     No current facility-administered medications on file prior to visit.       Past Medical History:   Diagnosis Date   • Anxiety    • Arthritis    • Depression    • Diabetes mellitus (CMS/[facility])    • Environmental allergies    • Headache, tension-type    • HL (hearing loss)    • Hyperlipidemia    • Hypertension    • Low back pain    • Neuropathy    • Neuropathy in diabetes (CMS/HCC)    • OA (osteoarthritis)    • Paroxysmal atrial fibrillation (CMS/[facility]) 8/6/2016   • Rotator cuff tear      Past Surgical History:   Procedure Laterality Date   • BACK SURGERY      X2   • BLADDER SUSPENSION     • CHOLECYSTECTOMY     • HYSTERECTOMY  1970   • KNEE ARTHROPLASTY Right    • SHOULDER ARTHROSCOPY W/ ROTATOR CUFF REPAIR Left 11/12/2019    Procedure: Arthroscopic rotator cuff repair with subacromial decompression, Arthroscopic distal clavicle excision, and arthroscopic labral debridement ;  Surgeon: Cresencio Christina MD;  Location: [facility] OR [facility];  Service: Orthopedics   • SHOULDER SURGERY Right      Social History     Occupational History   • Not on file   Tobacco Use   • Smoking status: Never Smoker   • Smokeless tobacco: Never Used   Substance and Sexual Activity   • Alcohol use: Yes     Comment: occasionally socially   • Drug use: Never   • Sexual activity: Defer      Social History     Social History Narrative   • Not on file     Family History   Problem Relation Age of Onset   •  "Thyroid disease Mother    • Anxiety disorder Mother    • Depression Mother    • Bipolar disorder Mother    • Heart failure Father    • Hypertension Father    • Kidney nephrosis Brother    • Malig Hyperthermia Neg Hx        Review of Systems: 14 point review of systems performed, positive pertinent findings identified in HPI. All remaining systems negative     Review of Systems      Physical Exam:   Vitals:    05/17/21 1355   Temp: 96.9 °F (36.1 °C)   Weight: 74.4 kg (164 lb 0.4 oz)   Height: 154.6 cm (60.87\")   PainSc:   4   PainLoc: Foot     Physical Exam   Constitutional: pleasant, well developed   Eyes: sclera non icteric  Hearing : adequate for exam  Cardiovascular: palpable pulses in right foot, right calf/ thigh NT without sign of DVT  Respiratoy: breathing unlabored   Neurological: grossly sensate to LT throughout right LE but diminished in the toes  Psychiatric: oriented with normal mood and affect.   Lymphatic: No palpable popliteal lymphadenopathy right LE  Skin: intact throughout right leg/foot  Musculoskeletal: On examination the right ankle there is a very small area of slight fullness over the anterior ankle.  There is mild discomfort to palpation she is able to actively dorsiflex recruiting her lesser toe extensors and EHL.  Mild discomfort palpation slight swelling over the dorsum of the midfoot.  Physical Exam  Ortho Exam    Radiology: 3 views of the right foot ordered evaluate pain reviewed and no prior x-rays able for comparison there is significant arthritic change at the midfoot especially at the second and third as well as some to the fourth TMT joint as well as the medial naviculocuneiform articulation.  There are some spurring of the dorsum of the talar neck     MRI films and report of the right foot dated 5/13/2021 which included up to just proximal to the navicular show complete rupture of the anterior tibial tendon with stump retracted to at least the level of the talonavicular joint.  " There is also moderate advanced midfoot arthritis at the tarsometatarsal joints and naviculocuneiform articulations but no visible fracture lines the Lisfranc ligament is intact.    Assessment/Plan: 1.  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    We had a long discussion regarding operative and nonoperative treatment options and from a surgical standpoint the anterior tib tendon is not repairable that would need to be reconstructed using a cadaver allograft and reviewed with her that this has potential complications especially wound healing given her fact that she is diabetic and but need to be anchored into the midfoot where she already has some arthritis.    She is also on Coumadin and but need to come off of this for surgery.    After thorough discussion of operative and nonoperative treatment options for just guided trial of bracing first and see how she did with this and discussed ultimate need for possible long-term bracing with a custom dorsiflexion assisted AFO however what we are decided to try for now is to put her in an off-the-shelf AFO with an accommodative shoe and use a cane.    And see how she is doing about 3 weeks.    Does have arthritic change to the midfoot but will hold off on any injections at this point and see how she does with the AFO but may eventually need a custom molded foot bed.

## 2021-06-01 ENCOUNTER — APPOINTMENT (OUTPATIENT)
Dept: MRI IMAGING | Facility: HOSPITAL | Age: 76
End: 2021-06-01

## 2021-06-10 ENCOUNTER — OFFICE VISIT (OUTPATIENT)
Dept: ORTHOPEDIC SURGERY | Facility: CLINIC | Age: 76
End: 2021-06-10

## 2021-06-10 VITALS — BODY MASS INDEX: 30.96 KG/M2 | TEMPERATURE: 97.3 F | HEIGHT: 61 IN | WEIGHT: 164 LBS

## 2021-06-10 DIAGNOSIS — M19.079 ARTHRITIS OF FOOT: ICD-10-CM

## 2021-06-10 DIAGNOSIS — S86.219A RUPTURE OF TIBIALIS ANTERIOR TENDON: Primary | ICD-10-CM

## 2021-06-10 PROCEDURE — 99213 OFFICE O/P EST LOW 20 MIN: CPT | Performed by: ORTHOPAEDIC SURGERY

## 2021-06-11 NOTE — PROGRESS NOTES
"Ankle Follow Up      Patient: Samira Workman    YOB: 1945 76 y.o. female    Chief Complaints: Ankle doing okay    History of Present Illness: Patient was seen initially on 5/17/2021 after several months of pain in the right midfoot with swelling and discomfort.  She had a subsequent trip and fall around 5/11/2021.  She was seen by her PCP and had an MRI which had shown an anterior tib tendon rupture.    She has a history of diabetes with chronic numbness in her toes and is also on Coumadin for A. fib.    We discussed operative and nonoperative treatment options and she was fitted with an off-the-shelf AFO brace while she contemplated surgical or nonsurgical treatment.  I did reach out to her PCP who said that she could come off her Coumadin for surgery if needed and she has been seen by cardiologist in the past as well.    She is seen today stating that the AFO has helped and is really not been using her cane much.  But seem to be getting around relatively well in this.  Her pain is improved with minimal if any discomfort about the ankle.  HPI    ROS: ankle pain  Past Medical History:   Diagnosis Date   • Anxiety    • Arthritis    • Depression    • Diabetes mellitus (CMS/HCC)    • Environmental allergies    • Headache, tension-type    • HL (hearing loss)    • Hyperlipidemia    • Hypertension    • Low back pain    • Neuropathy    • Neuropathy in diabetes (CMS/HCC)    • OA (osteoarthritis)    • Paroxysmal atrial fibrillation (CMS/HCC) 8/6/2016   • Rotator cuff tear        Physical Exam:   Vitals:    06/10/21 1341   Temp: 97.3 °F (36.3 °C)   Weight: 74.4 kg (164 lb)   Height: 154.9 cm (61\")   PainSc:   4     Well developed with normal mood.  On exam she is ambulating well today.  There is area of slight fullness over the anterior ankle consistent with her anterior tib tendon rupture.  She is able to actively dorsiflex ankle recruiting her lesser extensors and EHL.  She had supple range of motion to the ankle. "  There was diminished sensation in the toes and minimal discomfort over the dorsum of the midfoot in the area of arthritis.      Radiology:MRI films and report of the right foot dated 5/13/2021 were again reviewed which included up to just proximal to the navicular show complete rupture of the anterior tibial tendon with stump retracted to at least the level of the talonavicular joint.  There is also moderate advanced midfoot arthritis at the tarsometatarsal joints and naviculocuneiform articulations but no visible fracture lines the Lisfranc ligament is intact.      Assessment/Plan:  1.  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    We discussed operative and nonoperative treatment options and she does not wish to proceed with operative treatment and understands the ramifications thereof.  I think this is reasonable given her age and activity level and potential operative complications and could not guarantee that she would be brace free even with surgical treatment.  She may need long-term use of a brace and could develop some clawing of the lesser toes but already has symptomatic midfoot arthritis.    Recommend that she use her cane and she was prescribed a custom AFO dorsiflexion assist brace with custom molded foot bed to help support the midfoot arthritis with instructions on obtaining this.    She will continue with range of motion exercises    Anything worsens let me know otherwise I will see her back in 2 months to assess progress.

## 2021-06-16 ENCOUNTER — HOSPITAL ENCOUNTER (OUTPATIENT)
Dept: INFUSION THERAPY | Facility: HOSPITAL | Age: 76
Discharge: HOME | End: 2021-06-16
Admitting: PSYCHIATRY & NEUROLOGY

## 2021-06-16 DIAGNOSIS — R20.0 HAND NUMBNESS: ICD-10-CM

## 2021-06-16 DIAGNOSIS — G62.9 NEUROPATHY: ICD-10-CM

## 2021-06-16 PROCEDURE — 95911 NRV CNDJ TEST 9-10 STUDIES: CPT

## 2021-06-16 PROCEDURE — 95886 MUSC TEST DONE W/N TEST COMP: CPT

## 2021-06-16 PROCEDURE — 95911 NRV CNDJ TEST 9-10 STUDIES: CPT | Performed by: PSYCHIATRY & NEUROLOGY

## 2021-06-16 PROCEDURE — 95886 MUSC TEST DONE W/N TEST COMP: CPT | Performed by: PSYCHIATRY & NEUROLOGY

## 2021-06-16 NOTE — PROCEDURES
EMG and Nerve Conduction Studies    I.      Instrument used: Neuromax 1002  II.     Please see data sheets for tabular summary of NCS and details on methods, temperatures and lab standards.   III.    EMG muscles tested for upper extremity studies include the deltoid, biceps, triceps, pronator teres, extensor digitorum communis, first dorsal interosseous and abductor pollicis brevis.    IV.   EMG muscles tested for lower extremity studies include the vastus lateralis, tibialis anterior, peroneus longus, medial gastrocnemius and extensor digitorum brevis.    V.    Additional muscles tested as needed.  Paraspinal muscles tested as needed.   VI.   Please see data sheets for tabular summary of EMG findings.   VII. The complete report includes the data sheets.      Indication: Bilateral hand numbness  History: 76-year-old woman with longstanding diabetes which has been well controlled also has pain in her feet who describes bilateral hand numbness waking her from sleep as well as progressively worsening while she is sitting reading.  It seems to involve all digits.  It is not particularly painful.      Ht: 154.9 cm  Wt: 74.4 kg; BMI 30.99  HbA1C:   Lab Results   Component Value Date    HGBA1C 6.2 04/12/2021     TSH:   Lab Results   Component Value Date    TSH 2.700 06/22/2020       Technical summary:   Nerve conduction studies were obtained in both arms.  Skin temperatures were initially cold and so the hands were warmed.  At the time of study skin temperatures were at least 32 °C measured on the palms.  Needle examination was obtained on selected muscles in both arms.    Results:  1.  Prolonged median sensory latencies bilaterally at 5.9 ms on the right and 3.9 ms on the left.  The amplitude was very low on the right at 8.6 µV and normal on the left.  2.  Normal ulnar sensory studies bilaterally.  3.  Normal right radial sensory study.  4.  Moderately to severely prolonged right median motor latency at 5.7 ms with normal  conduction velocity and amplitudes.  Mildly prolonged left median motor latency at 4.5 ms with normal conduction velocity and amplitudes.  5.  Normal ulnar motor studies bilaterally.  6.  Needle examination of selected muscles in both arm showed normal insertional activities throughout.  There was a mild increased number of large motor units in both abductor pollicis brevis muscles with increased firing rate and reduced interference patterns.  The remaining muscles tested showed normal motor units and recruitment patterns.    Impression:  Abnormal study showing bilateral median neuropathies at the wrists, moderate to severe on the right and moderate on the left.  There was no evidence of an ulnar neuropathy or cervical radiculopathy on either side by this study.  Needle exam changes were consistent with the nerve conduction findings.  Study results were discussed with the patient.    Christopher Gunter M.D.              Dictated utilizing Dragon dictation.

## 2021-07-06 ENCOUNTER — TELEPHONE (OUTPATIENT)
Dept: ORTHOPEDIC SURGERY | Facility: CLINIC | Age: 76
End: 2021-07-06

## 2021-07-06 NOTE — TELEPHONE ENCOUNTER
[facility]UB STAFF ATTEMPTED CLINICAL WARM TRANSFER - NO ANSWER     Caller: Samira Workman    Relationship: Self    Best call back number: [phone]     Equipment requested: PATIENT WAS GIVEN SCRIPT FOR custom AFO dorsiflexion assist brace with custom molded foot bed to help support the RIGHT midfoot arthritis FROM DR. HARMON 06/10/21 APPT     Reason for the request: PATIENT CONTACTED [facility] WHO FIRST TOLD PATIENT THAT MEDICARE INSURANCE WOULD NOT COVER COST.     PATIENT THEN SAID SHE WOULD PAY FOR COST OUT OF POCKET, BUT [facility] THEN TOLD PATIENT THEY WOULD NOT MAKE THE RIGHT SHOE INSERT DUE TO PATIENT HAVING DIABETES.     PATIENT ALSO CONTACTED [facility], [facility], [facility], & [facility] WHO ALL TOLD PATIENT THEY WOULD NOT MAKE RIGHT SHOE INSERT DUE TO PATIENT'S DIABETES. ASKING WHAT SHE SHOULD DO?     Prescribing Provider: DR. HARMON     Best call back number: [phone]     THANKS

## 2021-07-07 ENCOUNTER — TELEPHONE (OUTPATIENT)
Dept: ORTHOPEDIC SURGERY | Facility: CLINIC | Age: 76
End: 2021-07-07

## 2021-07-07 NOTE — TELEPHONE ENCOUNTER
I spoke with patient and she is tried multiple places including  [place] feet first etc. and then would do the diabetic shoe to accommodate the dorsiflexion assist AFO.  She had been using the other AFO and says she is getting some discomfort in her feet but is watching him carefully not had any ulcerations that she reported.    Told her I have reached out to [facility] and spoke with Gurdeep Preciado there today who is going to check to see if they could accommodate her and let me know but I have not heard back from him yet.  Reviewed her [facility] town next week but will let her know as soon as I hear from them.  She appreciated the call

## 2021-07-08 ENCOUNTER — TELEPHONE (OUTPATIENT)
Dept: ORTHOPEDIC SURGERY | Facility: CLINIC | Age: 76
End: 2021-07-08

## 2021-07-08 NOTE — TELEPHONE ENCOUNTER
I spoke with patient today and have spoken with Judson Preciado at [facility] and described the patient's needs including the fact that she is diabetic and he does feel like he can help her.  I described to him what she needs and gave him her name and phone number and they will call about getting her scheduled to be seen for an evaluation.  She to me she appreciated my health.  Judson has my number to call if there are any questions or problems.

## 2021-07-12 ENCOUNTER — ANTICOAGULATION VISIT (OUTPATIENT)
Dept: FAMILY MEDICINE CLINIC | Facility: CLINIC | Age: 76
End: 2021-07-12

## 2021-07-12 DIAGNOSIS — I48.0 PAROXYSMAL ATRIAL FIBRILLATION (HCC): Primary | ICD-10-CM

## 2021-07-12 DIAGNOSIS — Z79.01 LONG TERM CURRENT USE OF ANTICOAGULANT THERAPY: ICD-10-CM

## 2021-07-12 LAB — INR PPP: 2.6 (ref 0.9–1.1)

## 2021-07-12 PROCEDURE — 36416 COLLJ CAPILLARY BLOOD SPEC: CPT | Performed by: NURSE PRACTITIONER

## 2021-07-12 PROCEDURE — 85610 PROTHROMBIN TIME: CPT | Performed by: NURSE PRACTITIONER

## 2021-08-03 RX ORDER — WARFARIN SODIUM 5 MG/1
TABLET ORAL
Qty: 30 TABLET | Refills: 3 | Status: SHIPPED | OUTPATIENT
Start: 2021-08-03 | End: 2021-11-03

## 2021-08-05 ENCOUNTER — TELEPHONE (OUTPATIENT)
Dept: FAMILY MEDICINE CLINIC | Facility: CLINIC | Age: 76
End: 2021-08-05

## 2021-08-09 RX ORDER — PRAVASTATIN SODIUM 40 MG
TABLET ORAL
Qty: 90 TABLET | Refills: 2 | Status: SHIPPED | OUTPATIENT
Start: 2021-08-09 | End: 2022-05-05

## 2021-08-19 ENCOUNTER — OFFICE VISIT (OUTPATIENT)
Dept: FAMILY MEDICINE CLINIC | Facility: CLINIC | Age: 76
End: 2021-08-19

## 2021-08-19 VITALS
TEMPERATURE: 98.2 F | HEART RATE: 65 BPM | DIASTOLIC BLOOD PRESSURE: 66 MMHG | HEIGHT: 61 IN | OXYGEN SATURATION: 97 % | BODY MASS INDEX: 29.53 KG/M2 | SYSTOLIC BLOOD PRESSURE: 122 MMHG | WEIGHT: 156.4 LBS

## 2021-08-19 DIAGNOSIS — M25.551 RIGHT HIP PAIN: ICD-10-CM

## 2021-08-19 DIAGNOSIS — I48.0 PAROXYSMAL ATRIAL FIBRILLATION (HCC): ICD-10-CM

## 2021-08-19 DIAGNOSIS — Z79.01 LONG TERM CURRENT USE OF ANTICOAGULANT THERAPY: Primary | ICD-10-CM

## 2021-08-19 DIAGNOSIS — M76.899 HAMSTRING TENDONITIS: ICD-10-CM

## 2021-08-19 LAB
INR PPP: 2 (ref 0.9–1.1)
PROTHROMBIN TIME: 24.2 SECONDS

## 2021-08-19 PROCEDURE — 73502 X-RAY EXAM HIP UNI 2-3 VIEWS: CPT | Performed by: NURSE PRACTITIONER

## 2021-08-19 PROCEDURE — 99214 OFFICE O/P EST MOD 30 MIN: CPT | Performed by: NURSE PRACTITIONER

## 2021-08-19 PROCEDURE — 85610 PROTHROMBIN TIME: CPT | Performed by: NURSE PRACTITIONER

## 2021-08-19 PROCEDURE — 36416 COLLJ CAPILLARY BLOOD SPEC: CPT | Performed by: NURSE PRACTITIONER

## 2021-08-19 NOTE — PROGRESS NOTES
"Chief Complaint  Hip Pain (Patient is wanting to establish primary care. She is having right hip pain she wants to discuss ( wore mask and goggles) ) and Coagulation Disorder (Patient is needing her pt/inr checked )    Subjective          Samira Workman presents to [facility]  History of Present Illness  Patient of Dr. Leiva is transferring care to me today.    Patient having right hip pain off and on.  It does not cause her to limp.  She has a tightness and a pain in the hamstring tendon on the right side she states it feels like a bone is sticking out.  This is been going on for several months causes her pain whether she is doing activity or not.  She also is seeing a specialist for a rupture of her tibialis anterior tendon.  Ever since that happened she states she is had constant problems with the right side of her body.      Objective   Vital Signs:   /66   Pulse 65   Temp 98.2 °F (36.8 °C)   Ht 154.9 cm (60.98\")   Wt 70.9 kg (156 lb 6.4 oz)   SpO2 97%   BMI 29.57 kg/m²     Physical Exam  Vitals reviewed.   Constitutional:       General: She is not in acute distress.     Appearance: She is well-developed.   HENT:      Head: Normocephalic.   Cardiovascular:      Rate and Rhythm: Normal rate and regular rhythm.      Heart sounds: Normal heart sounds.   Pulmonary:      Effort: Pulmonary effort is normal.      Breath sounds: Normal breath sounds.   Neurological:      Mental Status: She is alert and oriented to person, place, and time.      Gait: Gait normal.   Psychiatric:         Behavior: Behavior normal.         Thought Content: Thought content normal.         Judgment: Judgment normal.        Result Review :                Xray- right hip    Findings- normal   No comparison     Assessment and Plan    Diagnoses and all orders for this visit:    1. Long term current use of anticoagulant therapy (Primary)  -     POC Protime / INR    2. Paroxysmal atrial fibrillation " (CMS/[facility])  -     POC Protime / INR    3. Hamstring tendonitis    4. Right hip pain  -     XR Hip With or Without Pelvis 2 - 3 View Right  -     Ambulatory Referral to Physical Therapy Evaluate and treat        Follow Up   Return for Medicare Wellness.  Patient was given instructions and counseling regarding her condition or for health maintenance advice. Please see specific information pulled into the AVS if appropriate.     Referral to pt  rto for wellness exam and caome fasting for labs  Mask and googles worn

## 2021-09-02 NOTE — TELEPHONE ENCOUNTER
Caller: Samira Workman    Relationship: Self    Best call back number: [phone]    Medication needed:   Requested Prescriptions     Pending Prescriptions Disp Refills   • metFORMIN (GLUCOPHAGE) 500 MG tablet 90 tablet 1     Sig: Take 1 tablet by mouth Daily.       When do you need the refill by: ASAP    What additional details did the patient provide when requesting the medication:     Does the patient have less than a 3 day supply:  [x] Yes  [] No    What is the patient's preferred pharmacy: [id number] [address] AT [place] [phone] [phone]

## 2021-09-02 NOTE — TELEPHONE ENCOUNTER
Rx Refill Note  Requested Prescriptions     Pending Prescriptions Disp Refills   • metFORMIN (GLUCOPHAGE) 500 MG tablet 90 tablet 1     Sig: Take 1 tablet by mouth Daily.      Last office visit with prescribing clinician: 8/19/2021      Next office visit with prescribing clinician: 9/3/2021            Samra King MA  09/02/21, 14:13 EDT

## 2021-09-03 ENCOUNTER — OFFICE VISIT (OUTPATIENT)
Dept: FAMILY MEDICINE CLINIC | Facility: CLINIC | Age: 76
End: 2021-09-03

## 2021-09-03 VITALS
OXYGEN SATURATION: 100 % | TEMPERATURE: 97.1 F | BODY MASS INDEX: 28.81 KG/M2 | WEIGHT: 152.4 LBS | HEART RATE: 80 BPM | SYSTOLIC BLOOD PRESSURE: 116 MMHG | DIASTOLIC BLOOD PRESSURE: 80 MMHG | RESPIRATION RATE: 16 BRPM

## 2021-09-03 DIAGNOSIS — E11.8 CONTROLLED TYPE 2 DIABETES MELLITUS WITH COMPLICATION, WITHOUT LONG-TERM CURRENT USE OF INSULIN (HCC): ICD-10-CM

## 2021-09-03 DIAGNOSIS — E78.2 MIXED HYPERLIPIDEMIA: ICD-10-CM

## 2021-09-03 DIAGNOSIS — Z13.0 SCREENING FOR IRON DEFICIENCY ANEMIA: ICD-10-CM

## 2021-09-03 DIAGNOSIS — Z00.00 MEDICARE ANNUAL WELLNESS VISIT, SUBSEQUENT: Primary | ICD-10-CM

## 2021-09-03 PROCEDURE — 1170F FXNL STATUS ASSESSED: CPT | Performed by: NURSE PRACTITIONER

## 2021-09-03 PROCEDURE — G0439 PPPS, SUBSEQ VISIT: HCPCS | Performed by: NURSE PRACTITIONER

## 2021-09-03 PROCEDURE — 1125F AMNT PAIN NOTED PAIN PRSNT: CPT | Performed by: NURSE PRACTITIONER

## 2021-09-03 PROCEDURE — 1160F RVW MEDS BY RX/DR IN RCRD: CPT | Performed by: NURSE PRACTITIONER

## 2021-09-03 PROCEDURE — 96160 PT-FOCUSED HLTH RISK ASSMT: CPT | Performed by: NURSE PRACTITIONER

## 2021-09-03 NOTE — PROGRESS NOTES
The ABCs of the Annual Wellness Visit  Subsequent Medicare Wellness Visit    Chief Complaint   Patient presents with   • Medicare Wellness-subsequent     Patient is fasting, discuss shingrix injection..       Subjective    History of Present Illness:  Samira Workman is a 76 y.o. female who presents for a Subsequent Medicare Wellness Visit.    The following portions of the patient's history were reviewed and   updated as appropriate: allergies, current medications, past family history, past medical history, past social history, past surgical history and problem list.     Compared to one year ago, the patient feels her physical   health is the same.    Compared to one year ago, the patient feels her mental   health is the same.    Recent Hospitalizations:  She was not admitted to the hospital during the last year.       Current Medical Providers:  Patient Care Team:  Donavan Johnson APRN as PCP - General (Family Medicine)  Linda Shin MD as Consulting Physician (Cardiology)    Outpatient Medications Prior to Visit   Medication Sig Dispense Refill   • benazepril (LOTENSIN) 10 MG tablet TAKE 1 TABLET BY MOUTH DAILY 90 tablet 3   • Calcium Carb-Cholecalciferol (CALCIUM 1000 + D PO) Take 2 tablets by mouth Daily.     • citalopram (CeleXA) 20 MG tablet TAKE 1 TABLET BY MOUTH EVERY DAY 90 tablet 3   • cyclobenzaprine (FLEXERIL) 10 MG tablet Take 1 tablet by mouth 3 (Three) Times a Day As Needed for Muscle Spasms. 30 tablet 0   • glucosamine-chondroitin 500-400 MG capsule capsule Take  by mouth 3 (Three) Times a Day With Meals.     • metFORMIN (GLUCOPHAGE) 500 MG tablet Take 1 tablet by mouth Daily. 90 tablet 1   • Multiple Vitamin (MULTI VITAMIN DAILY PO) Take 1 tablet by mouth Daily.     • pravastatin (PRAVACHOL) 40 MG tablet TAKE 1 TABLET BY MOUTH DAILY 90 tablet 2   • traMADol (ULTRAM) 50 MG tablet Take 2 tablets by mouth 2 (Two) Times a Day As Needed for Moderate Pain . 120 tablet 1   • TURMERIC PO Take 1 tablet by mouth  Daily.     • warfarin (COUMADIN) 2.5 MG tablet Take one tablet by mouth one day of the week - Monday 30 tablet 3   • warfarin (COUMADIN) 5 MG tablet TAKE 1 TABLET BY MOUTH FOUR TIMES A WEEK(TUESDAY, THURSDAY, SATURDAY& SUNDAY) 30 tablet 3     No facility-administered medications prior to visit.       Opioid medication/s are on active medication list.  and I have evaluated her active treatment plan and pain score trends (see table).  Vitals:    09/03/21 1504   PainSc:   4     I have reviewed the chart for potential of high risk medication and harmful drug interactions in the elderly.            Aspirin is not on active medication list.  Aspirin use is not indicated based on review of current medical condition/s. Risk of harm outweighs potential benefits.  .    Patient Active Problem List   Diagnosis   • BMI 28.0-28.9,adult   • Chronic low back pain without sciatica   • Controlled type 2 diabetes mellitus with complication, without long-term current use of insulin (CMS/HCC)   • Herniated nucleus pulposus, L5-S1   • Hypertension, essential   • Long term current use of anticoagulant therapy   • Mixed hyperlipidemia   • Osteopenia   • Paroxysmal atrial fibrillation (CMS/HCC)   • Vitamin D deficiency   • Recurrent major depression in partial remission (CMS/HCC)   • Pulmonary nodule   • Neuropathy   • Insomnia   • Anxiety   • Nontraumatic tear of rotator cuff   • Arthritis of foot   • Rupture of tibialis anterior tendon   • Hamstring tendonitis   • Right hip pain   • Screening for iron deficiency anemia     Advance Care Planning   Advance Directive is not on file.  ACP discussion was held with the patient during this visit. Patient has an advance directive (not in EMR), copy requested.          Objective       Vitals:    09/03/21 1504   BP: 116/80   Pulse: 80   Resp: 16   Temp: 97.1 °F (36.2 °C)   SpO2: 100%   Weight: 69.1 kg (152 lb 6.4 oz)   PainSc:   4     BMI Readings from Last 1 Encounters:   09/03/21 28.81 kg/m²    BMI is above normal parameters. Recommendations include: exercise counseling    Does the patient have evidence of cognitive impairment? No    Physical Exam            HEALTH RISK ASSESSMENT    Smoking Status:  Social History     Tobacco Use   Smoking Status Never Smoker   Smokeless Tobacco Never Used     Alcohol Consumption:  Social History     Substance and Sexual Activity   Alcohol Use Yes    Comment: occasionally socially     Fall Risk Screen:    BERENICE Fall Risk Assessment has not been completed.    Depression Screening:  PHQ-2/PHQ-9 Depression Screening 9/1/2020   Little interest or pleasure in doing things 0   Feeling down, depressed, or hopeless 0   Total Score 0       Health Habits and Functional and Cognitive Screening:  Functional & Cognitive Status 9/3/2021   Do you have difficulty preparing food and eating? No   Do you have difficulty bathing yourself, getting dressed or grooming yourself? No   Do you have difficulty using the toilet? No   Do you have difficulty moving around from place to place? No   Do you have trouble with steps or getting out of a bed or a chair? No   Current Diet Well Balanced Diet   Dental Exam Up to date   Eye Exam Up to date   Exercise (times per week) 0 times per week   Current Exercises Include No Regular Exercise   Current Exercise Activities Include -   Do you need help using the phone?  No   Are you deaf or do you have serious difficulty hearing?  No   Do you need help with transportation? Yes   Do you need help shopping? Yes   Do you need help preparing meals?  No   Do you need help with housework?  No   Do you need help with laundry? No   Do you need help taking your medications? No   Do you need help managing money? No   Do you ever drive or ride in a car without wearing a seat belt? No   Have you felt unusual stress, anger or loneliness in the last month? No   Who do you live with? Other   If you need help, do you have trouble finding someone available to you? No    Have you been bothered in the last four weeks by sexual problems? No   Do you have difficulty concentrating, remembering or making decisions? No       Age-appropriate Screening Schedule:  Refer to the list below for future screening recommendations based on patient's age, sex and/or medical conditions. Orders for these recommended tests are listed in the plan section. The patient has been provided with a written plan.    Health Maintenance   Topic Date Due   • URINE MICROALBUMIN  Never done   • ZOSTER VACCINE (1 of 2) Never done   • DIABETIC EYE EXAM  12/06/2019   • DXA SCAN  10/24/2020   • LIPID PANEL  09/01/2021   • INFLUENZA VACCINE  10/01/2021   • HEMOGLOBIN A1C  10/12/2021   • TDAP/TD VACCINES (2 - Tdap) 04/29/2023              Assessment/Plan     CMS Preventative Services Quick Reference  Risk Factors Identified During Encounter  Fall Risk-High or Moderate  Inactivity/Sedentary  Obesity/Overweight   The above risks/problems have been discussed with the patient.  Follow up actions/plans if indicated are seen below in the Assessment/Plan Section.  Pertinent information has been shared with the patient in the After Visit Summary.    Diagnoses and all orders for this visit:    1. Medicare annual wellness visit, subsequent (Primary)    2. Screening for iron deficiency anemia  -     CBC & Differential    3. Mixed hyperlipidemia  -     Comprehensive Metabolic Panel  -     Lipid Panel With LDL / HDL Ratio    4. Controlled type 2 diabetes mellitus with complication, without long-term current use of insulin (CMS/[facility])  -     Comprehensive Metabolic Panel  -     Hemoglobin A1c        Follow Up:   Return if symptoms worsen or fail to improve.     An After Visit Summary and PPPS were given to the patient.    I spent 20 minutes caring for Samira on this date of service. This time includes time spent by me in the following activities:preparing for the visit, reviewing tests, obtaining and/or reviewing a separately obtained  history, ordering medications, tests, or procedures and documenting information in the medical record

## 2021-09-04 LAB
ALBUMIN SERPL-MCNC: 4.6 G/DL (ref 3.5–5.2)
ALBUMIN/GLOB SERPL: 1.8 G/DL
ALP SERPL-CCNC: 74 U/L (ref 39–117)
ALT SERPL-CCNC: 20 U/L (ref 1–33)
AST SERPL-CCNC: 24 U/L (ref 1–32)
BASOPHILS # BLD AUTO: 0.04 10*3/MM3 (ref 0–0.2)
BASOPHILS NFR BLD AUTO: 0.5 % (ref 0–1.5)
BILIRUB SERPL-MCNC: 0.4 MG/DL (ref 0–1.2)
BUN SERPL-MCNC: 15 MG/DL (ref 8–23)
BUN/CREAT SERPL: 24.2 (ref 7–25)
CALCIUM SERPL-MCNC: 9.8 MG/DL (ref 8.6–10.5)
CHLORIDE SERPL-SCNC: 102 MMOL/L (ref 98–107)
CHOLEST SERPL-MCNC: 176 MG/DL (ref 0–200)
CO2 SERPL-SCNC: 26.6 MMOL/L (ref 22–29)
CREAT SERPL-MCNC: 0.62 MG/DL (ref 0.57–1)
EOSINOPHIL # BLD AUTO: 0.28 10*3/MM3 (ref 0–0.4)
EOSINOPHIL NFR BLD AUTO: 3.3 % (ref 0.3–6.2)
ERYTHROCYTE [DISTWIDTH] IN BLOOD BY AUTOMATED COUNT: 12.6 % (ref 12.3–15.4)
GLOBULIN SER CALC-MCNC: 2.5 GM/DL
GLUCOSE SERPL-MCNC: 106 MG/DL (ref 65–99)
HBA1C MFR BLD: 6.1 % (ref 4.8–5.6)
HCT VFR BLD AUTO: 43.4 % (ref 34–46.6)
HDLC SERPL-MCNC: 56 MG/DL (ref 40–60)
HGB BLD-MCNC: 14.4 G/DL (ref 12–15.9)
IMM GRANULOCYTES # BLD AUTO: 0.03 10*3/MM3 (ref 0–0.05)
IMM GRANULOCYTES NFR BLD AUTO: 0.4 % (ref 0–0.5)
LDLC SERPL CALC-MCNC: 96 MG/DL (ref 0–100)
LDLC/HDLC SERPL: 1.65 {RATIO}
LYMPHOCYTES # BLD AUTO: 2.94 10*3/MM3 (ref 0.7–3.1)
LYMPHOCYTES NFR BLD AUTO: 35.2 % (ref 19.6–45.3)
MCH RBC QN AUTO: 30.1 PG (ref 26.6–33)
MCHC RBC AUTO-ENTMCNC: 33.2 G/DL (ref 31.5–35.7)
MCV RBC AUTO: 90.8 FL (ref 79–97)
MONOCYTES # BLD AUTO: 0.65 10*3/MM3 (ref 0.1–0.9)
MONOCYTES NFR BLD AUTO: 7.8 % (ref 5–12)
NEUTROPHILS # BLD AUTO: 4.42 10*3/MM3 (ref 1.7–7)
NEUTROPHILS NFR BLD AUTO: 52.8 % (ref 42.7–76)
NRBC BLD AUTO-RTO: 0 /100 WBC (ref 0–0.2)
PLATELET # BLD AUTO: 278 10*3/MM3 (ref 140–450)
POTASSIUM SERPL-SCNC: 4.2 MMOL/L (ref 3.5–5.2)
PROT SERPL-MCNC: 7.1 G/DL (ref 6–8.5)
RBC # BLD AUTO: 4.78 10*6/MM3 (ref 3.77–5.28)
SODIUM SERPL-SCNC: 138 MMOL/L (ref 136–145)
TRIGL SERPL-MCNC: 137 MG/DL (ref 0–150)
VLDLC SERPL CALC-MCNC: 24 MG/DL (ref 5–40)
WBC # BLD AUTO: 8.36 10*3/MM3 (ref 3.4–10.8)

## 2021-09-17 ENCOUNTER — TELEPHONE (OUTPATIENT)
Dept: URGENT CARE | Facility: CLINIC | Age: 76
End: 2021-09-17

## 2021-11-03 RX ORDER — WARFARIN SODIUM 5 MG/1
TABLET ORAL
Qty: 30 TABLET | Refills: 3 | Status: SHIPPED | OUTPATIENT
Start: 2021-11-03 | End: 2022-03-07

## 2021-11-08 ENCOUNTER — ANTICOAGULATION VISIT (OUTPATIENT)
Dept: FAMILY MEDICINE CLINIC | Facility: CLINIC | Age: 76
End: 2021-11-08

## 2021-11-08 ENCOUNTER — TELEPHONE (OUTPATIENT)
Dept: FAMILY MEDICINE CLINIC | Facility: CLINIC | Age: 76
End: 2021-11-08

## 2021-11-08 DIAGNOSIS — Z79.01 LONG TERM CURRENT USE OF ANTICOAGULANT THERAPY: Primary | ICD-10-CM

## 2021-11-08 LAB — INR PPP: 4.3 (ref 0.9–1.1)

## 2021-11-08 PROCEDURE — 36416 COLLJ CAPILLARY BLOOD SPEC: CPT | Performed by: NURSE PRACTITIONER

## 2021-11-08 PROCEDURE — 85610 PROTHROMBIN TIME: CPT | Performed by: NURSE PRACTITIONER

## 2021-11-15 ENCOUNTER — ANTICOAGULATION VISIT (OUTPATIENT)
Dept: FAMILY MEDICINE CLINIC | Facility: CLINIC | Age: 76
End: 2021-11-15

## 2021-11-15 DIAGNOSIS — I48.0 PAROXYSMAL ATRIAL FIBRILLATION (HCC): Primary | ICD-10-CM

## 2021-11-15 DIAGNOSIS — Z79.01 LONG TERM CURRENT USE OF ANTICOAGULANT THERAPY: ICD-10-CM

## 2021-11-15 LAB — INR PPP: 1.5 (ref 0.9–1.1)

## 2021-11-15 PROCEDURE — 36416 COLLJ CAPILLARY BLOOD SPEC: CPT | Performed by: NURSE PRACTITIONER

## 2021-11-15 PROCEDURE — 85610 PROTHROMBIN TIME: CPT | Performed by: NURSE PRACTITIONER

## 2021-11-29 ENCOUNTER — ANTICOAGULATION VISIT (OUTPATIENT)
Dept: FAMILY MEDICINE CLINIC | Facility: CLINIC | Age: 76
End: 2021-11-29

## 2021-11-29 DIAGNOSIS — I48.0 PAROXYSMAL ATRIAL FIBRILLATION (HCC): Primary | ICD-10-CM

## 2021-11-29 DIAGNOSIS — Z79.01 LONG TERM CURRENT USE OF ANTICOAGULANT THERAPY: ICD-10-CM

## 2021-11-29 LAB — INR PPP: 1.7 (ref 0.9–1.1)

## 2021-11-29 PROCEDURE — 85610 PROTHROMBIN TIME: CPT | Performed by: NURSE PRACTITIONER

## 2021-11-29 PROCEDURE — 36416 COLLJ CAPILLARY BLOOD SPEC: CPT | Performed by: NURSE PRACTITIONER

## 2021-12-17 ENCOUNTER — ANTICOAGULATION VISIT (OUTPATIENT)
Dept: FAMILY MEDICINE CLINIC | Facility: CLINIC | Age: 76
End: 2021-12-17

## 2021-12-17 DIAGNOSIS — I48.0 PAROXYSMAL ATRIAL FIBRILLATION (HCC): Primary | ICD-10-CM

## 2021-12-17 LAB — INR PPP: 2.7 (ref 0.9–1.1)

## 2021-12-17 PROCEDURE — 85610 PROTHROMBIN TIME: CPT | Performed by: NURSE PRACTITIONER

## 2021-12-17 PROCEDURE — 36416 COLLJ CAPILLARY BLOOD SPEC: CPT | Performed by: NURSE PRACTITIONER

## 2022-01-13 ENCOUNTER — ANTICOAGULATION VISIT (OUTPATIENT)
Dept: FAMILY MEDICINE CLINIC | Facility: CLINIC | Age: 77
End: 2022-01-13

## 2022-01-13 DIAGNOSIS — I48.0 PAROXYSMAL ATRIAL FIBRILLATION: Primary | ICD-10-CM

## 2022-01-13 LAB — INR PPP: 1.9 (ref 0.9–1.1)

## 2022-01-13 PROCEDURE — 36416 COLLJ CAPILLARY BLOOD SPEC: CPT | Performed by: NURSE PRACTITIONER

## 2022-01-13 PROCEDURE — 85610 PROTHROMBIN TIME: CPT | Performed by: NURSE PRACTITIONER

## 2022-02-10 ENCOUNTER — ANTICOAGULATION VISIT (OUTPATIENT)
Dept: FAMILY MEDICINE CLINIC | Facility: CLINIC | Age: 77
End: 2022-02-10

## 2022-02-10 DIAGNOSIS — I48.0 PAROXYSMAL ATRIAL FIBRILLATION: Primary | ICD-10-CM

## 2022-02-10 LAB — INR PPP: 1.8 (ref 0.9–1.1)

## 2022-02-10 PROCEDURE — 85610 PROTHROMBIN TIME: CPT | Performed by: NURSE PRACTITIONER

## 2022-02-10 PROCEDURE — 36416 COLLJ CAPILLARY BLOOD SPEC: CPT | Performed by: NURSE PRACTITIONER

## 2022-02-24 ENCOUNTER — ANTICOAGULATION VISIT (OUTPATIENT)
Dept: FAMILY MEDICINE CLINIC | Facility: CLINIC | Age: 77
End: 2022-02-24

## 2022-02-24 ENCOUNTER — TELEPHONE (OUTPATIENT)
Dept: FAMILY MEDICINE CLINIC | Facility: CLINIC | Age: 77
End: 2022-02-24

## 2022-02-24 DIAGNOSIS — I48.0 PAROXYSMAL ATRIAL FIBRILLATION: Primary | ICD-10-CM

## 2022-02-24 LAB — INR PPP: 2 (ref 0.9–1.1)

## 2022-02-24 PROCEDURE — 85610 PROTHROMBIN TIME: CPT | Performed by: NURSE PRACTITIONER

## 2022-02-24 PROCEDURE — 36416 COLLJ CAPILLARY BLOOD SPEC: CPT | Performed by: NURSE PRACTITIONER

## 2022-02-24 NOTE — TELEPHONE ENCOUNTER
Patient has recently experienced difficulty falling asleep ever since she started the prednisone optic drops. Could the optic drops be the cause? Please advise.

## 2022-03-07 RX ORDER — WARFARIN SODIUM 5 MG/1
TABLET ORAL
Qty: 30 TABLET | Refills: 3 | Status: SHIPPED | OUTPATIENT
Start: 2022-03-07 | End: 2022-04-29 | Stop reason: SDUPTHER

## 2022-03-07 NOTE — TELEPHONE ENCOUNTER
Rx Refill Note  Requested Prescriptions     Pending Prescriptions Disp Refills   • warfarin (COUMADIN) 5 MG tablet [Pharmacy Med Name: WARFARIN SOD 5MG TABLETS (PEACH)] 30 tablet 3     Sig: TAKE 1 TABLET BY MOUTH TUES THURS SATURDAY AND SUN      Last office visit with prescribing clinician: 9/3/2021      Next office visit with prescribing clinician: Visit date not found            Yissel Sherwood MA  03/07/22, 09:42 EST   Modified Advancement Flap Text: The defect edges were debeveled with a #15 scalpel blade.  Given the location of the defect, shape of the defect and the proximity to free margins a modified advancement flap was deemed most appropriate.  Using a sterile surgical marker, an appropriate advancement flap was drawn incorporating the defect and placing the expected incisions within the relaxed skin tension lines where possible.    The area thus outlined was incised deep to adipose tissue with a #15 scalpel blade.  The skin margins were undermined to an appropriate distance in all directions utilizing iris scissors.

## 2022-03-16 RX ORDER — BENAZEPRIL HYDROCHLORIDE 10 MG/1
10 TABLET ORAL DAILY
Qty: 90 TABLET | Refills: 1 | Status: SHIPPED | OUTPATIENT
Start: 2022-03-16 | End: 2022-07-01 | Stop reason: SDUPTHER

## 2022-03-16 NOTE — TELEPHONE ENCOUNTER
Rx Refill Note  Requested Prescriptions      No prescriptions requested or ordered in this encounter      Last office visit with prescribing clinician: 9/3/2021      Next office visit with prescribing clinician: Visit date not found            Samra King MA  03/16/22, 09:14 EDT  
Statement Selected

## 2022-03-25 ENCOUNTER — ANTICOAGULATION VISIT (OUTPATIENT)
Dept: FAMILY MEDICINE CLINIC | Facility: CLINIC | Age: 77
End: 2022-03-25

## 2022-03-25 DIAGNOSIS — I48.0 PAROXYSMAL ATRIAL FIBRILLATION: Primary | ICD-10-CM

## 2022-03-25 LAB — INR PPP: 3.1 (ref 0.9–1.1)

## 2022-03-25 PROCEDURE — 36416 COLLJ CAPILLARY BLOOD SPEC: CPT | Performed by: NURSE PRACTITIONER

## 2022-03-25 PROCEDURE — 85610 PROTHROMBIN TIME: CPT | Performed by: NURSE PRACTITIONER

## 2022-04-29 ENCOUNTER — OFFICE VISIT (OUTPATIENT)
Dept: FAMILY MEDICINE CLINIC | Facility: CLINIC | Age: 77
End: 2022-04-29

## 2022-04-29 VITALS
BODY MASS INDEX: 29.49 KG/M2 | WEIGHT: 156 LBS | OXYGEN SATURATION: 96 % | TEMPERATURE: 97.3 F | RESPIRATION RATE: 16 BRPM | HEART RATE: 59 BPM | DIASTOLIC BLOOD PRESSURE: 62 MMHG | SYSTOLIC BLOOD PRESSURE: 106 MMHG

## 2022-04-29 DIAGNOSIS — M79.671 RIGHT FOOT PAIN: ICD-10-CM

## 2022-04-29 DIAGNOSIS — I48.0 PAROXYSMAL ATRIAL FIBRILLATION: ICD-10-CM

## 2022-04-29 DIAGNOSIS — F33.41 RECURRENT MAJOR DEPRESSION IN PARTIAL REMISSION: ICD-10-CM

## 2022-04-29 DIAGNOSIS — Z13.0 SCREENING FOR IRON DEFICIENCY ANEMIA: ICD-10-CM

## 2022-04-29 DIAGNOSIS — F41.9 ANXIETY: ICD-10-CM

## 2022-04-29 DIAGNOSIS — I10 HYPERTENSION, ESSENTIAL: ICD-10-CM

## 2022-04-29 DIAGNOSIS — G62.9 NEUROPATHY: ICD-10-CM

## 2022-04-29 DIAGNOSIS — E11.8 CONTROLLED TYPE 2 DIABETES MELLITUS WITH COMPLICATION, WITHOUT LONG-TERM CURRENT USE OF INSULIN: ICD-10-CM

## 2022-04-29 DIAGNOSIS — M54.50 CHRONIC LOW BACK PAIN WITHOUT SCIATICA, UNSPECIFIED BACK PAIN LATERALITY: Primary | ICD-10-CM

## 2022-04-29 DIAGNOSIS — G89.29 CHRONIC LOW BACK PAIN WITHOUT SCIATICA, UNSPECIFIED BACK PAIN LATERALITY: Primary | ICD-10-CM

## 2022-04-29 DIAGNOSIS — E78.2 MIXED HYPERLIPIDEMIA: ICD-10-CM

## 2022-04-29 PROCEDURE — 99214 OFFICE O/P EST MOD 30 MIN: CPT | Performed by: NURSE PRACTITIONER

## 2022-04-29 RX ORDER — WARFARIN SODIUM 5 MG/1
TABLET ORAL
Qty: 48 TABLET | Refills: 3 | Status: SHIPPED | OUTPATIENT
Start: 2022-04-29 | End: 2022-07-01 | Stop reason: SDUPTHER

## 2022-04-29 RX ORDER — WARFARIN SODIUM 2.5 MG/1
TABLET ORAL
Qty: 36 TABLET | Refills: 3
Start: 2022-04-29 | End: 2022-07-01 | Stop reason: SDUPTHER

## 2022-04-29 RX ORDER — UREA 10 %
1 LOTION (ML) TOPICAL
COMMUNITY

## 2022-04-29 RX ORDER — TRAMADOL HYDROCHLORIDE 50 MG/1
100 TABLET ORAL 2 TIMES DAILY PRN
Qty: 120 TABLET | Refills: 0 | Status: SHIPPED | OUTPATIENT
Start: 2022-04-29 | End: 2022-06-24 | Stop reason: SDUPTHER

## 2022-04-29 RX ORDER — CITALOPRAM 20 MG/1
20 TABLET ORAL DAILY
Qty: 90 TABLET | Refills: 3 | Status: SHIPPED | OUTPATIENT
Start: 2022-04-29 | End: 2022-07-01 | Stop reason: SDUPTHER

## 2022-04-29 NOTE — PROGRESS NOTES
Chief Complaint  Back Pain (Low back area.)    Subjective          Samira Workman presents to [facility]  History of Present Illness  Chronic low back pain- using tramadol on a prn basis for back pain.  Working well without side effects.  Hasn't had a refill for 1 year but requesting a refill.    Diabetes-patient is currently on metformin 500 mg once a day.  A1c was last checked in September 2021 with a result of 6.1.    Hypertension- patient is on benazepril 10 mg a day as directed without any side effects.  Blood pressures well controlled in office today.    Hyperlipidemia-patient is on pravastatin 40 mg daily as directed any side effects.  Cholesterol was last checked in September 2021 with total cholesterol 176 and LDL of 96    Anxiety-patient is currently on Celexa 20 mg daily as directed any side effects working well to control anxiety.    A. fib-patient is on Coumadin INR last checked in March it was within normal limits      Objective   Vital Signs:   /62   Pulse 59   Temp 97.3 °F (36.3 °C)   Resp 16   Wt 70.8 kg (156 lb)   SpO2 96%   BMI 29.49 kg/m²     Physical Exam  Vitals reviewed.   Constitutional:       General: She is not in acute distress.     Appearance: She is well-developed.   HENT:      Head: Normocephalic.   Cardiovascular:      Rate and Rhythm: Normal rate and regular rhythm.      Heart sounds: Normal heart sounds.   Pulmonary:      Effort: Pulmonary effort is normal.      Breath sounds: Normal breath sounds.   Neurological:      Mental Status: She is alert and oriented to person, place, and time.      Gait: Gait normal.   Psychiatric:         Behavior: Behavior normal.         Thought Content: Thought content normal.         Judgment: Judgment normal.        Result Review :{Labs  Result Review  Imaging  Med Tab  Media  Procedures :23}   The following data was reviewed by: SALINAS Olmstead on 04/29/2022:  CMP    CMP 9/3/21   Glucose 106 (A)   BUN 15    Creatinine 0.62   eGFR Non  Am 94   eGFR African Am 113   Sodium 138   Potassium 4.2   Chloride 102   Calcium 9.8   Total Protein 7.1   Albumin 4.60   Globulin 2.5   Total Bilirubin 0.4   Alkaline Phosphatase 74   AST (SGOT) 24   ALT (SGPT) 20   (A) Abnormal value       Comments are available for some flowsheets but are not being displayed.           CBC w/diff    CBC w/Diff 9/3/21   WBC 8.36   RBC 4.78   Hemoglobin 14.4   Hematocrit 43.4   MCV 90.8   MCH 30.1   MCHC 33.2   RDW 12.6   Platelets 278   Neutrophil Rel % 52.8   Lymphocyte Rel % 35.2   Monocyte Rel % 7.8   Eosinophil Rel % 3.3   Basophil Rel % 0.5           Lipid Panel    Lipid Panel 9/3/21   Total Cholesterol 176   Triglycerides 137   HDL Cholesterol 56   VLDL Cholesterol 24   LDL Cholesterol  96   LDL/HDL Ratio 1.65      Comments are available for some flowsheets but are not being displayed.               Most Recent A1C    HGBA1C Most Recent 9/3/21   Hemoglobin A1C 6.10 (A)   (A) Abnormal value       Comments are available for some flowsheets but are not being displayed.                     Assessment and Plan    Diagnoses and all orders for this visit:    1. Chronic low back pain without sciatica, unspecified back pain laterality (Primary)  -     traMADol (ULTRAM) 50 MG tablet; Take 2 tablets by mouth 2 (Two) Times a Day As Needed for Moderate Pain .  Dispense: 120 tablet; Refill: 0    2. Controlled type 2 diabetes mellitus with complication, without long-term current use of insulin (HCC)  -     MicroAlbumin, Urine, Random - Urine, Clean Catch  -     Comprehensive Metabolic Panel  -     Hemoglobin A1c    3. Hypertension, essential    4. Mixed hyperlipidemia  -     Lipid Panel With LDL / HDL Ratio    5. Paroxysmal atrial fibrillation (HCC)  -     warfarin (COUMADIN) 5 MG tablet; taked 1 pill four days a week  Dispense: 48 tablet; Refill: 3  -     warfarin (COUMADIN) 2.5 MG tablet; Take one tablet three days a week  Dispense: 36 tablet;  Refill: 3    6. Anxiety  -     citalopram (CeleXA) 20 MG tablet; Take 1 tablet by mouth Daily.  Dispense: 90 tablet; Refill: 3    7. Screening for iron deficiency anemia  -     CBC & Differential    8. Neuropathy  -     traMADol (ULTRAM) 50 MG tablet; Take 2 tablets by mouth 2 (Two) Times a Day As Needed for Moderate Pain .  Dispense: 120 tablet; Refill: 0    9. Right foot pain  -     traMADol (ULTRAM) 50 MG tablet; Take 2 tablets by mouth 2 (Two) Times a Day As Needed for Moderate Pain .  Dispense: 120 tablet; Refill: 0    10. Recurrent major depression in partial remission (HCC)  -     citalopram (CeleXA) 20 MG tablet; Take 1 tablet by mouth Daily.  Dispense: 90 tablet; Refill: 3      BMI has not been calculated during today's encounter.          Follow Up   Return in about 6 months (around 10/29/2022) for Recheck.  Patient was given instructions and counseling regarding her condition or for health maintenance advice. Please see specific information pulled into the AVS if appropriate.     Cont same  Follow up after labs   rto in 6 mons     Mask and googles worn

## 2022-04-30 LAB
ALBUMIN SERPL-MCNC: 4.5 G/DL (ref 3.7–4.7)
ALBUMIN/GLOB SERPL: 1.8 {RATIO} (ref 1.2–2.2)
ALP SERPL-CCNC: 69 IU/L (ref 44–121)
ALT SERPL-CCNC: 22 IU/L (ref 0–32)
AST SERPL-CCNC: 25 IU/L (ref 0–40)
BASOPHILS # BLD AUTO: 0 X10E3/UL (ref 0–0.2)
BASOPHILS NFR BLD AUTO: 1 %
BILIRUB SERPL-MCNC: 0.3 MG/DL (ref 0–1.2)
BUN SERPL-MCNC: 19 MG/DL (ref 8–27)
BUN/CREAT SERPL: 34 (ref 12–28)
CALCIUM SERPL-MCNC: 9.7 MG/DL (ref 8.7–10.3)
CHLORIDE SERPL-SCNC: 100 MMOL/L (ref 96–106)
CHOLEST SERPL-MCNC: 163 MG/DL (ref 100–199)
CO2 SERPL-SCNC: 21 MMOL/L (ref 20–29)
CREAT SERPL-MCNC: 0.56 MG/DL (ref 0.57–1)
EGFRCR SERPLBLD CKD-EPI 2021: 94 ML/MIN/1.73
EOSINOPHIL # BLD AUTO: 0.2 X10E3/UL (ref 0–0.4)
EOSINOPHIL NFR BLD AUTO: 3 %
ERYTHROCYTE [DISTWIDTH] IN BLOOD BY AUTOMATED COUNT: 12.4 % (ref 11.7–15.4)
GLOBULIN SER CALC-MCNC: 2.5 G/DL (ref 1.5–4.5)
GLUCOSE SERPL-MCNC: 111 MG/DL (ref 65–99)
HBA1C MFR BLD: 6.6 % (ref 4.8–5.6)
HCT VFR BLD AUTO: 41.9 % (ref 34–46.6)
HDLC SERPL-MCNC: 56 MG/DL
HGB BLD-MCNC: 14.3 G/DL (ref 11.1–15.9)
IMM GRANULOCYTES # BLD AUTO: 0 X10E3/UL (ref 0–0.1)
IMM GRANULOCYTES NFR BLD AUTO: 0 %
LDLC SERPL CALC-MCNC: 86 MG/DL (ref 0–99)
LDLC/HDLC SERPL: 1.5 RATIO (ref 0–3.2)
LYMPHOCYTES # BLD AUTO: 2.3 X10E3/UL (ref 0.7–3.1)
LYMPHOCYTES NFR BLD AUTO: 33 %
MCH RBC QN AUTO: 30.5 PG (ref 26.6–33)
MCHC RBC AUTO-ENTMCNC: 34.1 G/DL (ref 31.5–35.7)
MCV RBC AUTO: 89 FL (ref 79–97)
MICROALBUMIN UR-MCNC: 5.3 UG/ML
MONOCYTES # BLD AUTO: 0.5 X10E3/UL (ref 0.1–0.9)
MONOCYTES NFR BLD AUTO: 7 %
NEUTROPHILS # BLD AUTO: 3.9 X10E3/UL (ref 1.4–7)
NEUTROPHILS NFR BLD AUTO: 56 %
PLATELET # BLD AUTO: 273 X10E3/UL (ref 150–450)
POTASSIUM SERPL-SCNC: 4.6 MMOL/L (ref 3.5–5.2)
PROT SERPL-MCNC: 7 G/DL (ref 6–8.5)
RBC # BLD AUTO: 4.69 X10E6/UL (ref 3.77–5.28)
SODIUM SERPL-SCNC: 140 MMOL/L (ref 134–144)
TRIGL SERPL-MCNC: 116 MG/DL (ref 0–149)
VLDLC SERPL CALC-MCNC: 21 MG/DL (ref 5–40)
WBC # BLD AUTO: 6.9 X10E3/UL (ref 3.4–10.8)

## 2022-05-05 RX ORDER — PRAVASTATIN SODIUM 40 MG
TABLET ORAL
Qty: 90 TABLET | Refills: 2 | Status: SHIPPED | OUTPATIENT
Start: 2022-05-05 | End: 2022-07-01 | Stop reason: SDUPTHER

## 2022-05-13 ENCOUNTER — ANTICOAGULATION VISIT (OUTPATIENT)
Dept: FAMILY MEDICINE CLINIC | Facility: CLINIC | Age: 77
End: 2022-05-13

## 2022-05-13 DIAGNOSIS — I48.0 PAROXYSMAL ATRIAL FIBRILLATION: Primary | ICD-10-CM

## 2022-05-13 LAB — INR PPP: 2.4 (ref 0.9–1.1)

## 2022-05-13 PROCEDURE — 85610 PROTHROMBIN TIME: CPT | Performed by: NURSE PRACTITIONER

## 2022-05-13 PROCEDURE — 36416 COLLJ CAPILLARY BLOOD SPEC: CPT | Performed by: NURSE PRACTITIONER

## 2022-05-17 ENCOUNTER — TELEPHONE (OUTPATIENT)
Dept: FAMILY MEDICINE CLINIC | Facility: CLINIC | Age: 77
End: 2022-05-17

## 2022-05-17 NOTE — TELEPHONE ENCOUNTER
Contacted patient, she is aware that XR will be performed at the time of appt with Donavan on 5/20.

## 2022-05-17 NOTE — TELEPHONE ENCOUNTER
Caller: Samira Workman    Relationship: Self    Best call back number: [phone]     What orders are you requesting (i.e. lab or imaging): XRAY OF LEFT WRIST AND RIGHT KNEE    In what timeframe would the patient need to come in: ASAP    Where will you receive your lab/imaging services:     Additional notes: PATIENT CALLING STATING SHE FELL ABOUT A WEEK AGO SHE WAS SITTING IN A STOOL AND FEEL SIDEWAYS.

## 2022-05-17 NOTE — TELEPHONE ENCOUNTER
Pt will need to be seen first. She has apt scheduled for 5/20. If she is not okay waiting until then she will need to seek treatment at

## 2022-05-17 NOTE — TELEPHONE ENCOUNTER
Are you okay placing outpatient x-ray orders for pt to go to [place] and then fu after radiology apt?

## 2022-05-20 ENCOUNTER — OFFICE VISIT (OUTPATIENT)
Dept: FAMILY MEDICINE CLINIC | Facility: CLINIC | Age: 77
End: 2022-05-20

## 2022-05-20 VITALS
BODY MASS INDEX: 28.52 KG/M2 | HEIGHT: 62 IN | WEIGHT: 155 LBS | TEMPERATURE: 97.1 F | HEART RATE: 62 BPM | OXYGEN SATURATION: 99 % | SYSTOLIC BLOOD PRESSURE: 138 MMHG | DIASTOLIC BLOOD PRESSURE: 74 MMHG

## 2022-05-20 DIAGNOSIS — M79.642 LEFT HAND PAIN: ICD-10-CM

## 2022-05-20 DIAGNOSIS — M25.561 ACUTE PAIN OF RIGHT KNEE: Primary | ICD-10-CM

## 2022-05-20 PROCEDURE — 99213 OFFICE O/P EST LOW 20 MIN: CPT | Performed by: NURSE PRACTITIONER

## 2022-05-20 PROCEDURE — 73130 X-RAY EXAM OF HAND: CPT | Performed by: NURSE PRACTITIONER

## 2022-05-20 PROCEDURE — 73560 X-RAY EXAM OF KNEE 1 OR 2: CPT | Performed by: NURSE PRACTITIONER

## 2022-05-20 NOTE — PROGRESS NOTES
"Chief Complaint  Knee Injury (Pt fell and injured R knee as well as L hand. Pt also has a knee replacement for that R knee and is requesting a x ray)    Subjective          Samira Workman presents to [facility]  History of Present Illness  Last Sunday patient was gardening and sitting on his garden chairs and leaned over and the chair fell from under her and she fell on her right knee which has a history of a total replacement and in her left hand.  Since then she is having some right knee and left thumb pain.  No swelling or redness.  She is using her tramadol plus Tylenol which is helping with the pain requesting x-rays to make sure that nothing is broken  Objective   Vital Signs:  /74   Pulse 62   Temp 97.1 °F (36.2 °C)   Ht 157.5 cm (62\")   Wt 70.3 kg (155 lb)   SpO2 99%   BMI 28.35 kg/m²         Physical Exam  Vitals reviewed.   Constitutional:       General: She is not in acute distress.     Appearance: She is well-developed.   HENT:      Head: Normocephalic.   Cardiovascular:      Rate and Rhythm: Normal rate and regular rhythm.      Heart sounds: Normal heart sounds.   Pulmonary:      Effort: Pulmonary effort is normal.      Breath sounds: Normal breath sounds.   Neurological:      Mental Status: She is alert and oriented to person, place, and time.      Gait: Gait normal.   Psychiatric:         Behavior: Behavior normal.         Thought Content: Thought content normal.         Judgment: Judgment normal.        Result Review :   The following data was reviewed by: SALINAS Olmstead on 05/20/2022:  CMP    CMP 9/3/21 4/29/22   Glucose 106 (A) 111 (A)   BUN 15 19   Creatinine 0.62 0.56 (A)   eGFR Non  Am 94    eGFR African Am 113    Sodium 138 140   Potassium 4.2 4.6   Chloride 102 100   Calcium 9.8 9.7   Total Protein 7.1 7.0   Albumin 4.60 4.5   Globulin 2.5 2.5   Total Bilirubin 0.4 0.3   Alkaline Phosphatase 74 69   AST (SGOT) 24 25   ALT (SGPT) 20 22   (A) " Abnormal value       Comments are available for some flowsheets but are not being displayed.           CBC w/diff    CBC w/Diff 9/3/21 4/29/22   WBC 8.36 6.9   RBC 4.78 4.69   Hemoglobin 14.4 14.3   Hematocrit 43.4 41.9   MCV 90.8 89   MCH 30.1 30.5   MCHC 33.2 34.1   RDW 12.6 12.4   Platelets 278 273   Neutrophil Rel % 52.8 56   Lymphocyte Rel % 35.2 33   Monocyte Rel % 7.8 7   Eosinophil Rel % 3.3 3   Basophil Rel % 0.5 1           Lipid Panel    Lipid Panel 9/3/21 4/29/22   Total Cholesterol 176 163   Triglycerides 137 116   HDL Cholesterol 56 56   VLDL Cholesterol 24 21   LDL Cholesterol  96 86   LDL/HDL Ratio 1.65 1.5      Comments are available for some flowsheets but are not being displayed.               Most Recent A1C    HGBA1C Most Recent 4/29/22   Hemoglobin A1C 6.6 (A)   (A) Abnormal value       Comments are available for some flowsheets but are not being displayed.                    Xray- left hand    Findings-normal   No comparison    Xray- right knee    Findings- normal total replacement  nocomparison        Assessment and Plan    Diagnoses and all orders for this visit:    1. Acute pain of right knee (Primary)  -     XR Knee 1 or 2 View Right (In Office)    2. Left hand pain  -     XR Hand 3+ View Left             Follow Up   Return if symptoms worsen or fail to improve.  Patient was given instructions and counseling regarding her condition or for health maintenance advice. Please see specific information pulled into the AVS if appropriate.     Ice to the knee and the hand continue with rotating Tylenol and tramadol.  Return to the office in 2 to 3 weeks if no improvement    Mask and googles worn

## 2022-06-10 ENCOUNTER — ANTICOAGULATION VISIT (OUTPATIENT)
Dept: FAMILY MEDICINE CLINIC | Facility: CLINIC | Age: 77
End: 2022-06-10

## 2022-06-10 DIAGNOSIS — M54.50 CHRONIC LOW BACK PAIN WITHOUT SCIATICA, UNSPECIFIED BACK PAIN LATERALITY: ICD-10-CM

## 2022-06-10 DIAGNOSIS — M79.671 RIGHT FOOT PAIN: ICD-10-CM

## 2022-06-10 DIAGNOSIS — G62.9 NEUROPATHY: ICD-10-CM

## 2022-06-10 DIAGNOSIS — Z79.01 LONG TERM CURRENT USE OF ANTICOAGULANT THERAPY: Primary | ICD-10-CM

## 2022-06-10 DIAGNOSIS — G89.29 CHRONIC LOW BACK PAIN WITHOUT SCIATICA, UNSPECIFIED BACK PAIN LATERALITY: ICD-10-CM

## 2022-06-10 LAB — INR PPP: 1.9 (ref 0.9–1.1)

## 2022-06-10 PROCEDURE — 36416 COLLJ CAPILLARY BLOOD SPEC: CPT | Performed by: NURSE PRACTITIONER

## 2022-06-10 PROCEDURE — 85610 PROTHROMBIN TIME: CPT | Performed by: NURSE PRACTITIONER

## 2022-06-10 NOTE — TELEPHONE ENCOUNTER
Patient will call back in July to schedule with Dr Adalgisa Hdz. PT/INR will continue once patient returns from [place].

## 2022-06-23 NOTE — TELEPHONE ENCOUNTER
Pt called, she will schedule with Dr. Hdz when her template is open.  Please advise on medications she needs refilled.  Also, pt ONLY needs the Tramadol from [facility].  She states all [facility].

## 2022-06-23 NOTE — TELEPHONE ENCOUNTER
Left vm for patient to call office to schedule continuing care with selected provider at her convenience.

## 2022-06-24 DIAGNOSIS — G89.29 CHRONIC LOW BACK PAIN WITHOUT SCIATICA, UNSPECIFIED BACK PAIN LATERALITY: ICD-10-CM

## 2022-06-24 DIAGNOSIS — M79.671 RIGHT FOOT PAIN: ICD-10-CM

## 2022-06-24 DIAGNOSIS — G62.9 NEUROPATHY: ICD-10-CM

## 2022-06-24 DIAGNOSIS — M54.50 CHRONIC LOW BACK PAIN WITHOUT SCIATICA, UNSPECIFIED BACK PAIN LATERALITY: ICD-10-CM

## 2022-06-24 RX ORDER — TRAMADOL HYDROCHLORIDE 50 MG/1
100 TABLET ORAL 2 TIMES DAILY PRN
Qty: 120 TABLET | Refills: 0 | Status: CANCELLED | OUTPATIENT
Start: 2022-06-24

## 2022-06-24 RX ORDER — TRAMADOL HYDROCHLORIDE 50 MG/1
100 TABLET ORAL 2 TIMES DAILY PRN
Qty: 120 TABLET | Refills: 0 | Status: SHIPPED | OUTPATIENT
Start: 2022-06-24 | End: 2022-12-02

## 2022-06-24 NOTE — PROGRESS NOTES
We have contacted patient twice, patient has v/u and has agreed to call back in mid July to schedule with Dr Adalgisa Hdz

## 2022-06-24 NOTE — TELEPHONE ENCOUNTER
Rx Refill Note  Requested Prescriptions     Pending Prescriptions Disp Refills   • traMADol (ULTRAM) 50 MG tablet 120 tablet 0     Sig: Take 2 tablets by mouth 2 (Two) Times a Day As Needed for Moderate Pain .      Last office visit with prescribing clinician: 5/20/2022      Next office visit with prescribing clinician: 6/23/2022            Bella Silva MA  06/24/22, 12:12 EDT

## 2022-07-01 DIAGNOSIS — F41.9 ANXIETY: ICD-10-CM

## 2022-07-01 DIAGNOSIS — F33.41 RECURRENT MAJOR DEPRESSION IN PARTIAL REMISSION: ICD-10-CM

## 2022-07-01 DIAGNOSIS — I48.0 PAROXYSMAL ATRIAL FIBRILLATION: ICD-10-CM

## 2022-07-01 RX ORDER — WARFARIN SODIUM 2.5 MG/1
TABLET ORAL
Qty: 36 TABLET | Refills: 3 | Status: SHIPPED | OUTPATIENT
Start: 2022-07-01 | End: 2022-09-12

## 2022-07-01 RX ORDER — WARFARIN SODIUM 5 MG/1
TABLET ORAL
Qty: 48 TABLET | Refills: 3 | Status: SHIPPED | OUTPATIENT
Start: 2022-07-01 | End: 2022-09-12

## 2022-07-01 RX ORDER — CITALOPRAM 20 MG/1
20 TABLET ORAL DAILY
Qty: 60 TABLET | Refills: 0 | Status: SHIPPED | OUTPATIENT
Start: 2022-07-01 | End: 2022-08-29

## 2022-07-01 RX ORDER — PRAVASTATIN SODIUM 40 MG
40 TABLET ORAL DAILY
Qty: 60 TABLET | Refills: 0 | Status: SHIPPED | OUTPATIENT
Start: 2022-07-01 | End: 2022-09-02

## 2022-07-01 RX ORDER — BENAZEPRIL HYDROCHLORIDE 10 MG/1
10 TABLET ORAL DAILY
Qty: 60 TABLET | Refills: 0 | Status: SHIPPED | OUTPATIENT
Start: 2022-07-01 | End: 2022-10-04

## 2022-07-01 NOTE — TELEPHONE ENCOUNTER
Rx Refill Note  Requested Prescriptions      No prescriptions requested or ordered in this encounter      Last office visit with prescribing clinician: Visit date not found      Next office visit with prescribing clinician: Visit date not found            Samra King MA  07/01/22, 09:38 EDT     WAS AGATA PT WANTS TO SCHEDULE WITH DR. HICKEY ONCE HER TEMPLATE OPENS UP

## 2022-07-01 NOTE — TELEPHONE ENCOUNTER
----- Message from SALINAS Luciano sent at 7/1/2022 12:08 PM EDT -----  Can you confirm her warfarin dose?

## 2022-07-01 NOTE — TELEPHONE ENCOUNTER
I called to confirm warfarin prescription. Patient is taking warfarin 5 mg five times a week and 2.5 mg two times a week.    [Negative] : Heme/Lymph

## 2022-07-09 ENCOUNTER — TELEPHONE ENCOUNTER (OUTPATIENT)
Dept: URBAN - METROPOLITAN AREA CLINIC 121 | Facility: CLINIC | Age: 77
End: 2022-07-09

## 2022-07-10 ENCOUNTER — TELEPHONE ENCOUNTER (OUTPATIENT)
Dept: URBAN - METROPOLITAN AREA CLINIC 121 | Facility: CLINIC | Age: 77
End: 2022-07-10

## 2022-07-30 ENCOUNTER — TELEPHONE ENCOUNTER (OUTPATIENT)
Age: 77
End: 2022-07-30

## 2022-07-31 ENCOUNTER — TELEPHONE ENCOUNTER (OUTPATIENT)
Age: 77
End: 2022-07-31

## 2022-07-31 RX ORDER — LORATADINE 5 MG
17 GRAMS POWDER TABLET,CHEWABLE ORAL
Qty: 0 | Refills: 16 | Status: ACTIVE | COMMUNITY
Start: 2016-02-12

## 2022-07-31 RX ORDER — LORATADINE 5 MG
17 GRAMS TABLET,CHEWABLE ORAL
Qty: 0 | Refills: 16 | Status: ACTIVE | COMMUNITY
Start: 2016-04-12

## 2022-07-31 RX ORDER — PSYLLIUM SEED
1 TABLESPOON POWDER PACKET (EA) ORAL
Qty: 0 | Refills: 16 | Status: ACTIVE | COMMUNITY
Start: 2016-02-12

## 2022-07-31 RX ORDER — PSYLLIUM SEED
1 TABLESPOON PACKET (EA) ORAL
Qty: 0 | Refills: 16 | Status: ACTIVE | COMMUNITY
Start: 2016-04-12

## 2022-08-28 DIAGNOSIS — F33.41 RECURRENT MAJOR DEPRESSION IN PARTIAL REMISSION: ICD-10-CM

## 2022-08-28 DIAGNOSIS — F41.9 ANXIETY: ICD-10-CM

## 2022-08-29 RX ORDER — CITALOPRAM 20 MG/1
TABLET ORAL
Qty: 60 TABLET | Refills: 0 | Status: SHIPPED | OUTPATIENT
Start: 2022-08-29 | End: 2022-10-04

## 2022-09-02 RX ORDER — PRAVASTATIN SODIUM 40 MG
TABLET ORAL
Qty: 30 TABLET | Refills: 0 | Status: SHIPPED | OUTPATIENT
Start: 2022-09-02 | End: 2022-10-04

## 2022-09-12 ENCOUNTER — OFFICE VISIT (OUTPATIENT)
Dept: FAMILY MEDICINE CLINIC | Facility: CLINIC | Age: 77
End: 2022-09-12

## 2022-09-12 VITALS
HEIGHT: 62 IN | BODY MASS INDEX: 28.21 KG/M2 | DIASTOLIC BLOOD PRESSURE: 80 MMHG | OXYGEN SATURATION: 98 % | RESPIRATION RATE: 16 BRPM | HEART RATE: 63 BPM | SYSTOLIC BLOOD PRESSURE: 134 MMHG | WEIGHT: 153.3 LBS | TEMPERATURE: 97.7 F

## 2022-09-12 DIAGNOSIS — R06.02 SHORTNESS OF BREATH: Primary | ICD-10-CM

## 2022-09-12 DIAGNOSIS — M62.838 MUSCLE SPASM: ICD-10-CM

## 2022-09-12 DIAGNOSIS — Z79.01 LONG TERM CURRENT USE OF ANTICOAGULANT THERAPY: ICD-10-CM

## 2022-09-12 DIAGNOSIS — I48.0 PAROXYSMAL ATRIAL FIBRILLATION: ICD-10-CM

## 2022-09-12 PROBLEM — M76.899 HAMSTRING TENDONITIS: Status: RESOLVED | Noted: 2021-08-19 | Resolved: 2022-09-12

## 2022-09-12 PROBLEM — M25.561 ACUTE PAIN OF RIGHT KNEE: Status: RESOLVED | Noted: 2022-05-20 | Resolved: 2022-09-12

## 2022-09-12 PROBLEM — R91.1 PULMONARY NODULE: Status: RESOLVED | Noted: 2019-09-10 | Resolved: 2022-09-12

## 2022-09-12 PROBLEM — Z13.0 SCREENING FOR IRON DEFICIENCY ANEMIA: Status: RESOLVED | Noted: 2021-09-03 | Resolved: 2022-09-12

## 2022-09-12 PROBLEM — M25.551 RIGHT HIP PAIN: Status: RESOLVED | Noted: 2021-08-19 | Resolved: 2022-09-12

## 2022-09-12 PROBLEM — R06.09 DOE (DYSPNEA ON EXERTION): Status: ACTIVE | Noted: 2022-09-12

## 2022-09-12 PROBLEM — M79.642 LEFT HAND PAIN: Status: RESOLVED | Noted: 2022-05-20 | Resolved: 2022-09-12

## 2022-09-12 PROBLEM — M19.079 ARTHRITIS OF FOOT: Status: RESOLVED | Noted: 2021-05-17 | Resolved: 2022-09-12

## 2022-09-12 LAB — INR PPP: 3 (ref 0.9–1.1)

## 2022-09-12 PROCEDURE — 99215 OFFICE O/P EST HI 40 MIN: CPT | Performed by: STUDENT IN AN ORGANIZED HEALTH CARE EDUCATION/TRAINING PROGRAM

## 2022-09-12 PROCEDURE — 85610 PROTHROMBIN TIME: CPT | Performed by: STUDENT IN AN ORGANIZED HEALTH CARE EDUCATION/TRAINING PROGRAM

## 2022-09-12 PROCEDURE — 36416 COLLJ CAPILLARY BLOOD SPEC: CPT | Performed by: STUDENT IN AN ORGANIZED HEALTH CARE EDUCATION/TRAINING PROGRAM

## 2022-09-12 RX ORDER — CYCLOBENZAPRINE HCL 5 MG
5 TABLET ORAL NIGHTLY PRN
Qty: 15 TABLET | Refills: 0 | Status: SHIPPED | OUTPATIENT
Start: 2022-09-12 | End: 2022-10-04

## 2022-09-12 NOTE — ASSESSMENT & PLAN NOTE
Patient reports a few months of dyspnea on exertion.  Initially only happened when the weather was warm and patient was walking from car to grocery store.  She states now it is occurring when she is walking from her bathroom to her bedroom.  She occasionally also has chest pains associated with it.  Improves with rest.  Patient with history of diabetes which places her at increased risk for ischemic cardiac disease.  No history of pulmonary disorders but she does state that her father  of pulmonary hypertension which concerns her.   TTE in 2016 with borderline EF (50 to 55%).  Mild MR.   ECG performed in the office showed sinus rhythm without any ischemic phenomenon.  Transthoracic echocardiogram and exercise ECG stress test ordered today.

## 2022-09-12 NOTE — PROGRESS NOTES
"Chief Complaint  Hypertension, Hyperlipidemia, Shortness of Breath (From bedroom to kitchen being going on all summer.  ), Fatigue, Peripheral Neuropathy, Back Pain, and Leg Pain (Upper leg discuss medication's)    Subjective        Samira Workman presents to [facility]  77-year-old female with history of paroxysmal A. fib in 2016 (no known recurrence since that time, does not require rate or rhythm control) for which she takes warfarin, peripheral neuropathy, hypertension, hyperlipidemia who presents for worsening shortness of breath.    Patient states that symptoms have been present for a few months.  They began in the summer when it was hot outside and she noticed that when she walked from her car to inside the grocery store she would have difficulty catching her breath.  Rest improved symptoms.  Over the last few weeks she has noticed that symptoms have been occurring when walking from her bathroom to her bedroom, regardless of temperature outside.  She also has noticed intermittent chest pain which also resolves with rest.    No history of ischemic cardiac issues.  She did have one episode of A. fib in 2016 but has not had issues with since that time.  Major risk factors for cardiovascular disease are hyperlipidemia and hypertension.  She also has diabetes that is well controlled.  CT chest in 2019 performed for pulmonary nodules showed coronary artery calcifications at that time.      Objective   Vital Signs:  /80   Pulse 63   Temp 97.7 °F (36.5 °C)   Resp 16   Ht 157.5 cm (62\")   Wt 69.5 kg (153 lb 4.8 oz)   SpO2 98%   BMI 28.04 kg/m²   Estimated body mass index is 28.04 kg/m² as calculated from the following:    Height as of this encounter: 157.5 cm (62\").    Weight as of this encounter: 69.5 kg (153 lb 4.8 oz).    BMI is >= 25 and <30. (Overweight) The following options were offered after discussion;: exercise counseling/recommendations and nutrition " counseling/recommendations      Physical Exam  Constitutional:       General: She is not in acute distress.  Eyes:      Conjunctiva/sclera: Conjunctivae normal.   Cardiovascular:      Rate and Rhythm: Normal rate and regular rhythm.   Pulmonary:      Effort: Pulmonary effort is normal. No respiratory distress.      Breath sounds: Normal breath sounds.   Musculoskeletal:      Right lower leg: No edema.      Left lower leg: No edema.   Skin:     General: Skin is warm and dry.   Neurological:      Mental Status: She is alert and oriented to person, place, and time.   Psychiatric:         Mood and Affect: Mood normal.         Behavior: Behavior normal.        Result Review :  The following data was reviewed by: Adalgisa Hdz MD on 09/12/2022:  Common labs    Common Labsle 4/29/22 4/29/22 4/29/22 4/29/22 4/29/22    1311 1311 1311 1311 1311   Glucose   111 (A)     BUN   19     Creatinine   0.56 (A)     Sodium   140     Potassium   4.6     Chloride   100     Calcium   9.7     Total Protein   7.0     Albumin   4.5     Total Bilirubin   0.3     Alkaline Phosphatase   69     AST (SGOT)   25     ALT (SGPT)   22     WBC  6.9      Hemoglobin  14.3      Hematocrit  41.9      Platelets  273      Total Cholesterol    163    Triglycerides    116    HDL Cholesterol    56    LDL Cholesterol     86    Hemoglobin A1C     6.6 (A)   Microalbumin, Urine 5.3       (A) Abnormal value       Comments are available for some flowsheets but are not being displayed.           Data reviewed: Cardiology studies Echocardiogram from 2016 which showed LVEF of 50 to 55% and mild mitral regurg     ECG 12 Lead    Date/Time: 9/13/2022 7:36 AM  Performed by: Adalgisa Hdz MD  Authorized by: Adalgisa Hdz MD   Comparison: compared with previous ECG   Rhythm: sinus rhythm  Rate: normal  Conduction: conduction normal  ST Segments: ST segments normal  T Waves: T waves normal  QRS axis: normal  Other: no other findings    Clinical impression: normal  ECG              Assessment and Plan   Diagnoses and all orders for this visit:    1. Shortness of breath (Primary)  Assessment & Plan:  Patient reports a few months of dyspnea on exertion.  Initially only happened when the weather was warm and patient was walking from car to grocery store.  She states now it is occurring when she is walking from her bathroom to her bedroom.  She occasionally also has chest pains associated with it.  Improves with rest.  Patient with history of diabetes which places her at increased risk for ischemic cardiac disease.  No history of pulmonary disorders but she does state that her father  of pulmonary hypertension which concerns her.   TTE in 2016 with borderline EF (50 to 55%).  Mild MR.   ECG performed in the office showed sinus rhythm without any ischemic phenomenon.  Transthoracic echocardiogram and exercise ECG stress test ordered today.    Orders:  -     Treadmill Stress Test; Future  -     Adult Transthoracic Echo Complete W/ Cont if Necessary Per Protocol; Future    2. Paroxysmal atrial fibrillation (HCC)  Assessment & Plan:  Patient in sinus rhythm today.  She had an episode of A. fib in 2016 for which she was started on warfarin.  Not currently on beta-blocker.  She has not had symptoms since 2016.   [id number] of 4, indicating anticoagulation preferred to reduce stroke risk.  Previously offered DOAC but preferred to stay on Xarelto due to cost.  She is interested in a DOAC today if cost is reasonable.  No history of major bleeding or bruising.  INR today 3.0.  Goal 2-3.   If patient transitions to Xarelto, patient should hold 1 dose of warfarin prior to starting Xarelto to ensure INR less than 3.    Orders:  -     rivaroxaban (Xarelto) 20 MG tablet; Take 1 tablet by mouth Daily With Dinner.  Dispense: 30 tablet; Refill: 5    3. Long term current use of anticoagulant therapy  -     POC INR    4. Muscle spasm  -     cyclobenzaprine (FLEXERIL) 5 MG tablet; Take 1 tablet  by mouth At Night As Needed for Muscle Spasms (Pain).  Dispense: 15 tablet; Refill: 0    Other orders  -     ECG 12 Lead         I spent 50 minutes caring for Samira on this date of service. This time includes time spent by me in the following activities:preparing for the visit, reviewing tests, performing a medically appropriate examination and/or evaluation , counseling and educating the patient/family/caregiver, ordering medications, tests, or procedures and documenting information in the medical record  Follow Up   Return in about 3 months (around 12/12/2022) for SOA.  Patient was given instructions and counseling regarding her condition or for health maintenance advice. Please see specific information pulled into the AVS if appropriate.

## 2022-09-12 NOTE — ASSESSMENT & PLAN NOTE
Patient in sinus rhythm today.  She had an episode of A. fib in 2016 for which she was started on warfarin.  Not currently on beta-blocker.  She has not had symptoms since 2016.   [id number] of 4, indicating anticoagulation preferred to reduce stroke risk.  Previously offered DOAC but preferred to stay on Xarelto due to cost.  She is interested in a DOAC today if cost is reasonable.  No history of major bleeding or bruising.  INR today 3.0.  Goal 2-3.   If patient transitions to Xarelto, patient should hold 1 dose of warfarin prior to starting Xarelto to ensure INR less than 3.

## 2022-09-13 PROBLEM — R06.02 SHORTNESS OF BREATH: Status: ACTIVE | Noted: 2022-09-12

## 2022-09-13 PROCEDURE — 93000 ELECTROCARDIOGRAM COMPLETE: CPT | Performed by: STUDENT IN AN ORGANIZED HEALTH CARE EDUCATION/TRAINING PROGRAM

## 2022-09-20 ENCOUNTER — TELEPHONE (OUTPATIENT)
Dept: FAMILY MEDICINE CLINIC | Facility: CLINIC | Age: 77
End: 2022-09-20

## 2022-09-20 DIAGNOSIS — R06.02 SHORTNESS OF BREATH: Primary | ICD-10-CM

## 2022-09-20 NOTE — TELEPHONE ENCOUNTER
Pt called because she no longer wants to do the treadmill stress test and she wants to do the medication instead.     Please call when completed.

## 2022-10-04 ENCOUNTER — TELEPHONE (OUTPATIENT)
Dept: FAMILY MEDICINE CLINIC | Facility: CLINIC | Age: 77
End: 2022-10-04

## 2022-10-04 DIAGNOSIS — I48.0 PAROXYSMAL ATRIAL FIBRILLATION: Primary | ICD-10-CM

## 2022-10-04 DIAGNOSIS — F41.9 ANXIETY: ICD-10-CM

## 2022-10-04 DIAGNOSIS — M62.838 MUSCLE SPASM: ICD-10-CM

## 2022-10-04 DIAGNOSIS — F33.41 RECURRENT MAJOR DEPRESSION IN PARTIAL REMISSION: ICD-10-CM

## 2022-10-04 RX ORDER — WARFARIN SODIUM 2.5 MG/1
2.5 TABLET ORAL 2 TIMES WEEKLY
Qty: 24 TABLET | Refills: 3 | Status: SHIPPED | OUTPATIENT
Start: 2022-10-06

## 2022-10-04 RX ORDER — CYCLOBENZAPRINE HCL 5 MG
TABLET ORAL
Qty: 15 TABLET | Refills: 0 | Status: SHIPPED | OUTPATIENT
Start: 2022-10-04

## 2022-10-04 RX ORDER — BENAZEPRIL HYDROCHLORIDE 10 MG/1
TABLET ORAL
Qty: 60 TABLET | Refills: 0 | Status: SHIPPED | OUTPATIENT
Start: 2022-10-04 | End: 2023-03-01

## 2022-10-04 RX ORDER — PRAVASTATIN SODIUM 40 MG
TABLET ORAL
Qty: 60 TABLET | Refills: 0 | Status: SHIPPED | OUTPATIENT
Start: 2022-10-04 | End: 2023-03-01

## 2022-10-04 RX ORDER — CITALOPRAM 20 MG/1
TABLET ORAL
Qty: 90 TABLET | Refills: 0 | Status: SHIPPED | OUTPATIENT
Start: 2022-10-04

## 2022-10-04 RX ORDER — WARFARIN SODIUM 5 MG/1
TABLET ORAL
Qty: 60 TABLET | Refills: 3 | Status: SHIPPED | OUTPATIENT
Start: 2022-10-04 | End: 2022-12-20 | Stop reason: SDUPTHER

## 2022-10-04 NOTE — TELEPHONE ENCOUNTER
Rx Refill Note  Requested Prescriptions     Pending Prescriptions Disp Refills   • cyclobenzaprine (FLEXERIL) 5 MG tablet [Pharmacy Med Name: CYCLOBENZAPRINE HYDROCHLORIDE 5 MG Tablet] 15 tablet 0     Sig: TAKE 1 TABLET AT NIGHT AS NEEDED FOR MUSCLE SPASMS (PAIN)      Last office visit with prescribing clinician: 9/12/2022      Next office visit with prescribing clinician: 12/12/2022            Mono Rodriguez MA  10/04/22, 13:19 EDT

## 2022-10-04 NOTE — TELEPHONE ENCOUNTER
Caller: Samira Workman    Relationship: Self    Best call back number: [phone]       What medication are you requesting: WARFARIN 5 MG AND 2 MG TABLET    What are your current symptoms:     How long have you been experiencing symptoms:     Have you had these symptoms before:    [] Yes  [] No    Have you been treated for these symptoms before:   [] Yes  [] No    If a prescription is needed, what is your preferred pharmacy and phone number: [facility] - [facility] [address] - [phone] [facility] [phone] FX     Additional notes:PCP PRESCRIBED XARELTO AND THE PATIENT HAS TO PAY $130 OUT OF POCKET FOR THIS PRESCRIPTION. THE PATIENT WOULD RATHER GO BACK TO TAKING WARFARIN 5 MG AND 2 MG TABLET INSTEAD OF THE XARELTO. I DID NOT SEE WARFARIN ON THE PATIENTS MED LIST. PLEASE CALL AND ADVISE PATIENT OF WHAT SHE SHOULD DO.

## 2022-10-06 ENCOUNTER — HOSPITAL ENCOUNTER (OUTPATIENT)
Dept: CARDIOLOGY | Facility: HOSPITAL | Age: 77
Discharge: HOME | End: 2022-10-06
Admitting: STUDENT IN AN ORGANIZED HEALTH CARE EDUCATION/TRAINING PROGRAM

## 2022-10-06 VITALS
SYSTOLIC BLOOD PRESSURE: 148 MMHG | DIASTOLIC BLOOD PRESSURE: 74 MMHG | BODY MASS INDEX: 28.4 KG/M2 | HEIGHT: 62 IN | WEIGHT: 154.32 LBS | HEART RATE: 65 BPM

## 2022-10-06 DIAGNOSIS — R06.02 SHORTNESS OF BREATH: ICD-10-CM

## 2022-10-06 LAB
AORTIC DIMENSIONLESS INDEX: 0.7 (DI)
ASCENDING AORTA: 2.7 CM
BH CV ECHO MEAS - ACS: 0.99 CM
BH CV ECHO MEAS - AI P1/2T: 528.9 MSEC
BH CV ECHO MEAS - AO MEAN PG: 4.5 MMHG
BH CV ECHO MEAS - AO ROOT DIAM: 2.08 CM
BH CV ECHO MEAS - AO V2 VTI: 36 CM
BH CV ECHO MEAS - AVA(I,D): 2 CM2
BH CV ECHO MEAS - EDV(CUBED): 61 ML
BH CV ECHO MEAS - EDV(MOD-SP2): 97 ML
BH CV ECHO MEAS - EDV(MOD-SP4): 94 ML
BH CV ECHO MEAS - EF(MOD-BP): 65 %
BH CV ECHO MEAS - EF(MOD-SP2): 63.9 %
BH CV ECHO MEAS - EF(MOD-SP4): 66 %
BH CV ECHO MEAS - ESV(CUBED): 13.9 ML
BH CV ECHO MEAS - ESV(MOD-SP2): 35 ML
BH CV ECHO MEAS - ESV(MOD-SP4): 32 ML
BH CV ECHO MEAS - FS: 39 %
BH CV ECHO MEAS - IVS/LVPW: 0.99 CM
BH CV ECHO MEAS - IVSD: 0.91 CM
BH CV ECHO MEAS - LAT PEAK E' VEL: 8.8 CM/SEC
BH CV ECHO MEAS - LV DIASTOLIC VOL/BSA (35-75): 2.9 CM2
BH CV ECHO MEAS - LV MASS(C)D: 109.8 GRAMS
BH CV ECHO MEAS - LV MEAN PG: 1.72 MMHG
BH CV ECHO MEAS - LV SYSTOLIC VOL/BSA (12-30): 0.99 CM2
BH CV ECHO MEAS - LV V1 VTI: 24.2 CM
BH CV ECHO MEAS - LVIDD: 3.9 CM
BH CV ECHO MEAS - LVIDS: 2.4 CM
BH CV ECHO MEAS - LVOT AREA: 3 CM2
BH CV ECHO MEAS - LVOT DIAM: 1.95 CM
BH CV ECHO MEAS - LVPWD: 0.92 CM
BH CV ECHO MEAS - MED PEAK E' VEL: 6.67 CM/SEC
BH CV ECHO MEAS - MR MAX PG: 83.1 MMHG
BH CV ECHO MEAS - MR MAX VEL: 453.9 CM/SEC
BH CV ECHO MEAS - MV A DUR: 0.19 SEC
BH CV ECHO MEAS - MV A MAX VEL: 67.1 CM/SEC
BH CV ECHO MEAS - MV DEC SLOPE: 364.5 CM/SEC2
BH CV ECHO MEAS - MV DEC TIME: 0.19 MSEC
BH CV ECHO MEAS - MV E MAX VEL: 95.8 CM/SEC
BH CV ECHO MEAS - MV E/A: 1.43
BH CV ECHO MEAS - MV MEAN PG: 1.55 MMHG
BH CV ECHO MEAS - MV P1/2T: 84.6 MSEC
BH CV ECHO MEAS - MV V2 VTI: 36.1 CM
BH CV ECHO MEAS - MVA(P1/2T): 2.6 CM2
BH CV ECHO MEAS - MVA(VTI): 1.99 CM2
BH CV ECHO MEAS - PA ACC SLOPE: 9.9 CM/SEC2
BH CV ECHO MEAS - PA ACC TIME: 0.11 SEC
BH CV ECHO MEAS - PA PR(ACCEL): 29.9 MMHG
BH CV ECHO MEAS - PA V2 MAX: 101 CM/SEC
BH CV ECHO MEAS - PULM A REVS DUR: 0.13 SEC
BH CV ECHO MEAS - PULM A REVS VEL: 26.6 CM/SEC
BH CV ECHO MEAS - PULM DIAS VEL: 32 CM/SEC
BH CV ECHO MEAS - PULM S/D: 0.92
BH CV ECHO MEAS - PULM SYS VEL: 29.3 CM/SEC
BH CV ECHO MEAS - QP/QS: 1.02
BH CV ECHO MEAS - RAP SYSTOLE: 3 MMHG
BH CV ECHO MEAS - RV MAX PG: 2.8 MMHG
BH CV ECHO MEAS - RV V1 MAX: 83.4 CM/SEC
BH CV ECHO MEAS - RV V1 VTI: 22.5 CM
BH CV ECHO MEAS - RVOT DIAM: 2.04 CM
BH CV ECHO MEAS - RVSP: 39 MMHG
BH CV ECHO MEAS - SI(MOD-SP2): 1.93 ML/M2
BH CV ECHO MEAS - SI(MOD-SP4): 1.93 ML/M2
BH CV ECHO MEAS - SV(LVOT): 71.9 ML
BH CV ECHO MEAS - SV(MOD-SP2): 62 ML
BH CV ECHO MEAS - SV(MOD-SP4): 62 ML
BH CV ECHO MEAS - SV(RVOT): 73.2 ML
BH CV ECHO MEAS - TAPSE (>1.6): 2.4 CM
BH CV ECHO MEAS - TR MAX PG: 35.8 MMHG
BH CV ECHO MEAS - TR MAX VEL: 299.1 CM/SEC
BH CV ECHO MEASUREMENTS AVERAGE E/E' RATIO: 12.39
BH CV XLRA - RV BASE: 3.2 CM
BH CV XLRA - RV LENGTH: 6.2 CM
BH CV XLRA - RV MID: 3 CM
BH CV XLRA - TDI S': 13.3 CM/SEC
LEFT ATRIUM VOLUME: 76 ML
LV EF 2D ECHO EST: 65 %
MAXIMAL PREDICTED HEART RATE: 143 BPM
STRESS TARGET HR: 122 BPM

## 2022-10-06 PROCEDURE — 93306 TTE W/DOPPLER COMPLETE: CPT

## 2022-10-06 PROCEDURE — 93306 TTE W/DOPPLER COMPLETE: CPT | Performed by: INTERNAL MEDICINE

## 2022-10-11 NOTE — TELEPHONE ENCOUNTER
Caller: [facility] - [place], [place] - [address] - [phone] [facility] [phone] FX    Relationship: Pharmacy    Best call back number:[phone]    Requested Prescriptions:   Requested Prescriptions     Pending Prescriptions Disp Refills   • metFORMIN (GLUCOPHAGE) 500 MG tablet 60 tablet 0     Sig: Take 1 tablet by mouth Daily.        Pharmacy where request should be sent:  [facility] - [place], [place] - [address] - [phone] [facility] [phone] FX  [phone]    Additional details provided by patient: THEY FAXED A REQUEST 10/6 AND HAD NOT HEARD BACK FROM THE OFFICE. UNKNOWN HOW MUCH PATIENT HAS LEFT OF MEDICATION.      Constanza Marin, Carlos ASched Rep   10/11/22 09:33 EDT

## 2022-10-12 ENCOUNTER — HOSPITAL ENCOUNTER (OUTPATIENT)
Dept: NUCLEAR MEDICINE | Facility: HOSPITAL | Age: 77
Discharge: HOME | End: 2022-10-12

## 2022-10-12 DIAGNOSIS — R06.02 SHORTNESS OF BREATH: ICD-10-CM

## 2022-10-12 LAB
BH CV NUCLEAR PRIOR STUDY: 2
BH CV REST NUCLEAR ISOTOPE DOSE: 10.8 MCI
BH CV STRESS COMMENTS STAGE 1: NORMAL
BH CV STRESS DOSE REGADENOSON STAGE 1: 0.4
BH CV STRESS DURATION MIN STAGE 1: 0
BH CV STRESS DURATION SEC STAGE 1: 10
BH CV STRESS NUCLEAR ISOTOPE DOSE: 34.4 MCI
BH CV STRESS PROTOCOL 1: NORMAL
BH CV STRESS RECOVERY BP: NORMAL MMHG
BH CV STRESS RECOVERY HR: 69 BPM
BH CV STRESS STAGE 1: 1
MAXIMAL PREDICTED HEART RATE: 143 BPM
PERCENT MAX PREDICTED HR: 58.04 %
STRESS BASELINE BP: NORMAL MMHG
STRESS BASELINE HR: 57 BPM
STRESS PERCENT HR: 68 %
STRESS POST PEAK BP: NORMAL MMHG
STRESS POST PEAK HR: 83 BPM
STRESS TARGET HR: 122 BPM

## 2022-10-12 PROCEDURE — 93018 CV STRESS TEST I&R ONLY: CPT | Performed by: INTERNAL MEDICINE

## 2022-10-12 PROCEDURE — 78452 HT MUSCLE IMAGE SPECT MULT: CPT | Performed by: INTERNAL MEDICINE

## 2022-10-12 PROCEDURE — 25010000002 REGADENOSON 0.4 MG/5ML SOLUTION: Performed by: STUDENT IN AN ORGANIZED HEALTH CARE EDUCATION/TRAINING PROGRAM

## 2022-10-12 PROCEDURE — 0 TECHNETIUM SESTAMIBI: Performed by: STUDENT IN AN ORGANIZED HEALTH CARE EDUCATION/TRAINING PROGRAM

## 2022-10-12 PROCEDURE — A9500 TC99M SESTAMIBI: HCPCS | Performed by: STUDENT IN AN ORGANIZED HEALTH CARE EDUCATION/TRAINING PROGRAM

## 2022-10-12 PROCEDURE — 93016 CV STRESS TEST SUPVJ ONLY: CPT | Performed by: INTERNAL MEDICINE

## 2022-10-12 PROCEDURE — 78452 HT MUSCLE IMAGE SPECT MULT: CPT

## 2022-10-12 PROCEDURE — 93017 CV STRESS TEST TRACING ONLY: CPT

## 2022-10-12 RX ADMIN — REGADENOSON 0.4 MG: 0.08 INJECTION, SOLUTION INTRAVENOUS at 09:20

## 2022-10-12 RX ADMIN — TECHNETIUM TC 99M SESTAMIBI 1 DOSE: 1 INJECTION INTRAVENOUS at 07:57

## 2022-10-12 RX ADMIN — TECHNETIUM TC 99M SESTAMIBI 1 DOSE: 1 INJECTION INTRAVENOUS at 09:20

## 2022-10-27 ENCOUNTER — TELEMEDICINE (OUTPATIENT)
Dept: FAMILY MEDICINE CLINIC | Facility: CLINIC | Age: 77
End: 2022-10-27

## 2022-10-27 DIAGNOSIS — J06.9 UPPER RESPIRATORY TRACT INFECTION, UNSPECIFIED TYPE: ICD-10-CM

## 2022-10-27 DIAGNOSIS — R05.1 ACUTE COUGH: Primary | ICD-10-CM

## 2022-10-27 LAB
EXPIRATION DATE: NORMAL
FLUAV AG UPPER RESP QL IA.RAPID: NOT DETECTED
FLUBV AG UPPER RESP QL IA.RAPID: NOT DETECTED
INTERNAL CONTROL: NORMAL
Lab: NORMAL
SARS-COV-2 AG UPPER RESP QL IA.RAPID: NOT DETECTED

## 2022-10-27 PROCEDURE — 87428 SARSCOV & INF VIR A&B AG IA: CPT | Performed by: FAMILY MEDICINE

## 2022-10-27 PROCEDURE — 99213 OFFICE O/P EST LOW 20 MIN: CPT | Performed by: FAMILY MEDICINE

## 2022-10-27 RX ORDER — BENZONATATE 100 MG/1
100 CAPSULE ORAL 3 TIMES DAILY PRN
Qty: 30 CAPSULE | Refills: 0 | Status: SHIPPED | OUTPATIENT
Start: 2022-10-27

## 2022-10-27 RX ORDER — AMOXICILLIN 500 MG/1
500 CAPSULE ORAL 2 TIMES DAILY
Qty: 20 CAPSULE | Refills: 0 | Status: SHIPPED | OUTPATIENT
Start: 2022-10-27 | End: 2022-11-06

## 2022-10-27 NOTE — PROGRESS NOTES
Samira Workman is a 77 y.o. female.     Chief Complaint   Patient presents with   • Cough     No fever, congestion, drainage, wants Covid test. X 1 week.   • Headache       HPI     Pt is a pleasant 77 y.o. YO female here for cough      +ve sick contact, her dtr has the same sx     The following portions of the patient's history were reviewed and updated as appropriate: allergies, current medications, past family history, past medical history, past social history, past surgical history and problem list.    Review of Systems   Constitutional: Negative for activity change, appetite change, chills and fever.   HENT: Positive for congestion, postnasal drip and rhinorrhea.    Respiratory: Positive for cough. Negative for shortness of breath and wheezing.        Objective  There were no vitals filed for this visit.     Physical Exam  Constitutional:       General: She is not in acute distress.     Appearance: She is not ill-appearing, toxic-appearing or diaphoretic.   Neurological:      Mental Status: She is alert and oriented to person, place, and time.   Psychiatric:         Mood and Affect: Mood normal.         Behavior: Behavior normal.         Thought Content: Thought content normal.           Current Outpatient Medications:   •  benazepril (LOTENSIN) 10 MG tablet, TAKE 1 TABLET EVERY DAY, Disp: 60 tablet, Rfl: 0  •  BIOTIN PO, Take  by mouth., Disp: , Rfl:   •  Calcium Carb-Cholecalciferol (CALCIUM 1000 + D PO), Take 2 tablets by mouth Daily., Disp: , Rfl:   •  citalopram (CeleXA) 20 MG tablet, TAKE 1 TABLET EVERY DAY, Disp: 90 tablet, Rfl: 0  •  cyclobenzaprine (FLEXERIL) 5 MG tablet, TAKE 1 TABLET AT NIGHT AS NEEDED FOR MUSCLE SPASMS (PAIN), Disp: 15 tablet, Rfl: 0  •  glucosamine-chondroitin 500-400 MG capsule capsule, Take  by mouth 3 (Three) Times a Day With Meals., Disp: , Rfl:   •  melatonin 1 MG tablet, Take 1 mg by mouth., Disp: , Rfl:   •  metFORMIN (GLUCOPHAGE) 500 MG tablet, Take 1 tablet by mouth Daily.,  Disp: 60 tablet, Rfl: 0  •  Multiple Vitamin (MULTI VITAMIN DAILY PO), Take 1 tablet by mouth Daily., Disp: , Rfl:   •  pravastatin (PRAVACHOL) 40 MG tablet, TAKE 1 TABLET EVERY DAY, Disp: 60 tablet, Rfl: 0  •  traMADol (ULTRAM) 50 MG tablet, Take 2 tablets by mouth 2 (Two) Times a Day As Needed for Moderate Pain ., Disp: 120 tablet, Rfl: 0  •  TURMERIC PO, Take 1 tablet by mouth Daily., Disp: , Rfl:   •  warfarin (Coumadin) 2.5 MG tablet, Take 1 tablet by mouth 2 (Two) Times a Week., Disp: 24 tablet, Rfl: 3  •  warfarin (Coumadin) 5 MG tablet, Take 1- 5mg tab 5 days weekly. Take 1-2.5mg tab 2 days weekly., Disp: 60 tablet, Rfl: 3  •  amoxicillin (AMOXIL) 500 MG capsule, Take 1 capsule by mouth 2 (Two) Times a Day for 10 days., Disp: 20 capsule, Rfl: 0  •  benzonatate (Tessalon Perles) 100 MG capsule, Take 1 capsule by mouth 3 (Three) Times a Day As Needed for Cough., Disp: 30 capsule, Rfl: 0  •  tobramycin 0.3 % solution ophthalmic solution, Administer 1 drop to the right eye Every 4 (Four) Hours., Disp: 10 mL, Rfl: 0    Procedures    Lab Results (most recent)     None              Diagnoses and all orders for this visit:    1. Acute cough (Primary)  -     POCT SARS-CoV-2 Antigen TOM + Flu  -     benzonatate (Tessalon Perles) 100 MG capsule; Take 1 capsule by mouth 3 (Three) Times a Day As Needed for Cough.  Dispense: 30 capsule; Refill: 0    2. Upper respiratory tract infection, unspecified type  -     POCT SARS-CoV-2 Antigen TOM + Flu  -     amoxicillin (AMOXIL) 500 MG capsule; Take 1 capsule by mouth 2 (Two) Times a Day for 10 days.  Dispense: 20 capsule; Refill: 0  - Discussed supportive care     Return for if no better or worsening symptoms.      Valerie Gamboa MD    This was an audio and video enabled telemedicine encounter secondary to CDC recommendations and patient safety with COVID19 Pandemic.   Pt location ; car  Provider location; office

## 2022-10-31 ENCOUNTER — TELEPHONE (OUTPATIENT)
Dept: FAMILY MEDICINE CLINIC | Facility: CLINIC | Age: 77
End: 2022-10-31

## 2022-10-31 DIAGNOSIS — R05.9 COUGH, UNSPECIFIED TYPE: Primary | ICD-10-CM

## 2022-10-31 RX ORDER — DEXTROMETHORPHAN HBR 15 MG/1
1 CAPSULE, LIQUID FILLED ORAL EVERY 6 HOURS PRN
Qty: 40 EACH | Refills: 0 | Status: SHIPPED | OUTPATIENT
Start: 2022-10-31

## 2022-10-31 NOTE — TELEPHONE ENCOUNTER
If she is having difficulty breathing, she needs to go to the emergency room. I sent in another cough medicine (dextromethorphan) in pill form to see if that helps. She should also be using a steroid nasal spray daily. If symptoms do not start improving by middle of the week, please have her call to make an appt to be seen. Thank you!

## 2022-10-31 NOTE — TELEPHONE ENCOUNTER
Caller: Samira Workman    Relationship: Self    Best call back number: [phone]    What medication are you requesting: SOMETHING FOR COUGH    What are your current symptoms: SEVERE COUGH    How long have you been experiencing symptoms: 2 WEEKS  Have you had these symptoms before:    [x] Yes  [] No    Have you been treated for these symptoms before:   [x] Yes  [] No    If a prescription is needed, what is your preferred pharmacy and phone number: [id number] [place] [id number] [facility] - [place], [place] AT [facility] - [phone] [facility] [phone]      Additional notes: PATIENT STATED THE   benzonatate (Tessalon Perles) 100 MG capsule     ARE NOT HELPING AND SHE IS COUGHING TO THE POINT SHE CANNOT BREATH.  PATIENT STATED SHE HAS COUGHED THREE HOURS STRAIGHT BEFORE SHE COULD CALL TODAY.    PATIENT WOULD PREFER NOT TO HAVE THE LIQUID COUGH MEDICINE AS ITS HORRIBLE TO TRY TO TAKE.    PATIENT THOUGHT THAT THERE MIGHT BE ANOTHER PILL OR SOMETHING ELSE SHE COULD TRY.    PATIENT STATED SHE HAS A LOT OF DRAINAGE AND THOUGHT SHE MIGHT NEED SOMETHING FOR IT.

## 2022-12-02 DIAGNOSIS — M54.50 CHRONIC LOW BACK PAIN WITHOUT SCIATICA, UNSPECIFIED BACK PAIN LATERALITY: ICD-10-CM

## 2022-12-02 DIAGNOSIS — G89.29 CHRONIC LOW BACK PAIN WITHOUT SCIATICA, UNSPECIFIED BACK PAIN LATERALITY: ICD-10-CM

## 2022-12-02 DIAGNOSIS — M79.671 RIGHT FOOT PAIN: ICD-10-CM

## 2022-12-02 DIAGNOSIS — G62.9 NEUROPATHY: ICD-10-CM

## 2022-12-02 RX ORDER — TRAMADOL HYDROCHLORIDE 50 MG/1
TABLET ORAL
Qty: 120 TABLET | Refills: 0 | Status: SHIPPED | OUTPATIENT
Start: 2022-12-02

## 2022-12-02 NOTE — TELEPHONE ENCOUNTER
Rx Refill Note  Requested Prescriptions     Pending Prescriptions Disp Refills   • traMADol (ULTRAM) 50 MG tablet [Pharmacy Med Name: TRAMADOL 50MG TABLETS] 120 tablet 0     Sig: TAKE 2 TABLETS BY MOUTH TWICE DAILY AS NEEDED FOR MODERATE PAIN      Last office visit with prescribing clinician: Visit date not found   Last telemedicine visit with prescribing clinician: 12/12/2022   Next office visit with prescribing clinician: Visit date not found                         Would you like a call back once the refill request has been completed: [] Yes [] No    If the office needs to give you a call back, can they leave a voicemail: [] Yes [] No    Mono Rodriguez MA  12/02/22, 12:56 EST

## 2022-12-05 NOTE — TELEPHONE ENCOUNTER
Rx Refill Note  Requested Prescriptions     Pending Prescriptions Disp Refills   • metFORMIN (GLUCOPHAGE) 500 MG tablet [Pharmacy Med Name: METFORMIN HYDROCHLORIDE 500 MG Tablet] 60 tablet 0     Sig: TAKE 1 TABLET EVERY DAY      Last office visit with prescribing clinician: 9/12/2022   Last telemedicine visit with prescribing clinician: 12/12/2022   Next office visit with prescribing clinician: 12/12/2022   {TIP  Encounters:23}                      Would you like a call back once the refill request has been completed: [] Yes [] No    If the office needs to give you a call back, can they leave a voicemail: [] Yes [] No    Mono Rodriguez MA  12/05/22, 07:56 EST

## 2022-12-14 ENCOUNTER — OFFICE VISIT (OUTPATIENT)
Dept: FAMILY MEDICINE CLINIC | Facility: CLINIC | Age: 77
End: 2022-12-14

## 2022-12-14 VITALS
WEIGHT: 155 LBS | TEMPERATURE: 97.6 F | HEART RATE: 75 BPM | SYSTOLIC BLOOD PRESSURE: 128 MMHG | DIASTOLIC BLOOD PRESSURE: 70 MMHG | OXYGEN SATURATION: 98 % | BODY MASS INDEX: 28.52 KG/M2 | HEIGHT: 62 IN

## 2022-12-14 DIAGNOSIS — M54.12 CERVICAL RADICULOPATHY: ICD-10-CM

## 2022-12-14 DIAGNOSIS — Z79.01 LONG TERM CURRENT USE OF ANTICOAGULANT THERAPY: ICD-10-CM

## 2022-12-14 DIAGNOSIS — R53.1 WEAKNESS: ICD-10-CM

## 2022-12-14 DIAGNOSIS — E11.8 CONTROLLED TYPE 2 DIABETES MELLITUS WITH COMPLICATION, WITHOUT LONG-TERM CURRENT USE OF INSULIN: ICD-10-CM

## 2022-12-14 DIAGNOSIS — G62.9 NEUROPATHY: Primary | ICD-10-CM

## 2022-12-14 PROCEDURE — G0439 PPPS, SUBSEQ VISIT: HCPCS | Performed by: STUDENT IN AN ORGANIZED HEALTH CARE EDUCATION/TRAINING PROGRAM

## 2022-12-14 PROCEDURE — 1126F AMNT PAIN NOTED NONE PRSNT: CPT | Performed by: STUDENT IN AN ORGANIZED HEALTH CARE EDUCATION/TRAINING PROGRAM

## 2022-12-14 PROCEDURE — 1159F MED LIST DOCD IN RCRD: CPT | Performed by: STUDENT IN AN ORGANIZED HEALTH CARE EDUCATION/TRAINING PROGRAM

## 2022-12-14 PROCEDURE — 1170F FXNL STATUS ASSESSED: CPT | Performed by: STUDENT IN AN ORGANIZED HEALTH CARE EDUCATION/TRAINING PROGRAM

## 2022-12-14 PROCEDURE — 96160 PT-FOCUSED HLTH RISK ASSMT: CPT | Performed by: STUDENT IN AN ORGANIZED HEALTH CARE EDUCATION/TRAINING PROGRAM

## 2022-12-14 PROCEDURE — 99214 OFFICE O/P EST MOD 30 MIN: CPT | Performed by: STUDENT IN AN ORGANIZED HEALTH CARE EDUCATION/TRAINING PROGRAM

## 2022-12-15 LAB
ALBUMIN SERPL-MCNC: 4.5 G/DL (ref 3.5–5.2)
ALBUMIN/GLOB SERPL: 1.9 G/DL
ALP SERPL-CCNC: 81 U/L (ref 39–117)
ALT SERPL-CCNC: 18 U/L (ref 1–33)
AST SERPL-CCNC: 20 U/L (ref 1–32)
BILIRUB SERPL-MCNC: 0.2 MG/DL (ref 0–1.2)
BUN SERPL-MCNC: 16 MG/DL (ref 8–23)
BUN/CREAT SERPL: 29.6 (ref 7–25)
CALCIUM SERPL-MCNC: 10.3 MG/DL (ref 8.6–10.5)
CHLORIDE SERPL-SCNC: 102 MMOL/L (ref 98–107)
CO2 SERPL-SCNC: 28.4 MMOL/L (ref 22–29)
CREAT SERPL-MCNC: 0.54 MG/DL (ref 0.57–1)
EGFRCR SERPLBLD CKD-EPI 2021: 95 ML/MIN/1.73
FOLATE SERPL-MCNC: >20 NG/ML (ref 4.78–24.2)
GLOBULIN SER CALC-MCNC: 2.4 GM/DL
GLUCOSE SERPL-MCNC: 87 MG/DL (ref 65–99)
HBA1C MFR BLD: 6.6 % (ref 4.8–5.6)
INR PPP: 3.08 (ref 0.9–1.1)
POTASSIUM SERPL-SCNC: 4.8 MMOL/L (ref 3.5–5.2)
PROT SERPL-MCNC: 6.9 G/DL (ref 6–8.5)
PROTHROMBIN TIME: 32.2 SECONDS (ref 11.7–14.2)
SODIUM SERPL-SCNC: 140 MMOL/L (ref 136–145)
TSH SERPL DL<=0.005 MIU/L-ACNC: 2.68 UIU/ML (ref 0.27–4.2)
VIT B12 SERPL-MCNC: 1661 PG/ML (ref 211–946)

## 2022-12-16 NOTE — PROGRESS NOTES
Adjust medication to the following:  Take 1- 5mg tab 4 days weekly. Take 1-2.5mg tab 3 days weekly.    Please have her back in 2 weeks to recheck.   All other labs look great.

## 2022-12-17 NOTE — ASSESSMENT & PLAN NOTE
Well controlled on current regimen -metformin 500mg daily.  Continue current regimen. Will order A1c today with labs.  On ACEi and statin.  Recommend yearly eye exams and daily foot exams.

## 2022-12-17 NOTE — PROGRESS NOTES
The ABCs of the Annual Wellness Visit  Subsequent Medicare Wellness Visit    Subjective    Samira Workman is a 77 y.o. female who presents for a Subsequent Medicare Wellness Visit.    The following portions of the patient's history were reviewed and   updated as appropriate: allergies, current medications, past family history, past medical history, past social history, past surgical history and problem list.    Compared to one year ago, the patient feels her physical   health is the same.    Compared to one year ago, the patient feels her mental   health is the same.    Recent Hospitalizations:  She was not admitted to the hospital during the last year.       Current Medical Providers:  Patient Care Team:  Adalgisa Hdz MD as PCP - General (Family Medicine)  Linda Shin MD as Consulting Physician (Cardiology)    Outpatient Medications Prior to Visit   Medication Sig Dispense Refill   • benazepril (LOTENSIN) 10 MG tablet TAKE 1 TABLET EVERY DAY 60 tablet 0   • BIOTIN PO Take  by mouth.     • Calcium Carb-Cholecalciferol (CALCIUM 1000 + D PO) Take 2 tablets by mouth Daily.     • citalopram (CeleXA) 20 MG tablet TAKE 1 TABLET EVERY DAY 90 tablet 0   • cyclobenzaprine (FLEXERIL) 5 MG tablet TAKE 1 TABLET AT NIGHT AS NEEDED FOR MUSCLE SPASMS (PAIN) 15 tablet 0   • Dextromethorphan HBr 15 MG capsule Take 1 tablet by mouth Every 6 (Six) Hours As Needed (cough). 40 each 0   • glucosamine-chondroitin 500-400 MG capsule capsule Take  by mouth 3 (Three) Times a Day With Meals.     • melatonin 1 MG tablet Take 1 mg by mouth.     • Multiple Vitamin (MULTI VITAMIN DAILY PO) Take 1 tablet by mouth Daily.     • pravastatin (PRAVACHOL) 40 MG tablet TAKE 1 TABLET EVERY DAY 60 tablet 0   • traMADol (ULTRAM) 50 MG tablet TAKE 2 TABLETS BY MOUTH TWICE DAILY AS NEEDED FOR MODERATE PAIN 120 tablet 0   • TURMERIC PO Take 1 tablet by mouth Daily.     • warfarin (Coumadin) 2.5 MG tablet Take 1 tablet by mouth 2 (Two) Times a Week. 24 tablet  3   • warfarin (Coumadin) 5 MG tablet Take 1- 5mg tab 5 days weekly. Take 1-2.5mg tab 2 days weekly. 60 tablet 3   • metFORMIN (GLUCOPHAGE) 500 MG tablet TAKE 1 TABLET EVERY DAY 90 tablet 3   • benzonatate (Tessalon Perles) 100 MG capsule Take 1 capsule by mouth 3 (Three) Times a Day As Needed for Cough. 30 capsule 0   • tobramycin 0.3 % solution ophthalmic solution Administer 1 drop to the right eye Every 4 (Four) Hours. 10 mL 0     No facility-administered medications prior to visit.       Opioid medication/s are on active medication list.  and I have evaluated her active treatment plan and pain score trends (see table).  Vitals:    12/14/22 1322   PainSc: 0-No pain     I have reviewed the chart for potential of high risk medication and harmful drug interactions in the elderly.            Aspirin is not on active medication list.  Aspirin use is not indicated based on review of current medical condition/s. Risk of harm outweighs potential benefits.  .    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  ACP discussion was held with the patient during this visit. Patient does not have an advance directive, information provided.     Objective    Vitals:    12/14/22 1322   BP: 128/70   BP Location: Right arm   Patient Position: Sitting   Cuff Size: Adult   Pulse: 75   Temp: 97.6 °F  "(36.4 °C)   TempSrc: Infrared   SpO2: 98%   Weight: 70.3 kg (155 lb)   Height: 157 cm (61.81\")   PainSc: 0-No pain     Estimated body mass index is 28.52 kg/m² as calculated from the following:    Height as of this encounter: 157 cm (61.81\").    Weight as of this encounter: 70.3 kg (155 lb).    BMI is >= 25 and <30. (Overweight) The following options were offered after discussion;: nutrition counseling/recommendations      Does the patient have evidence of cognitive impairment? No    Lab Results   Component Value Date    HGBA1C 6.60 (H) 12/14/2022        HEALTH RISK ASSESSMENT    Smoking Status:  Social History     Tobacco Use   Smoking Status Never   Smokeless Tobacco Never     Alcohol Consumption:  Social History     Substance and Sexual Activity   Alcohol Use Yes    Comment: occasionally socially     Fall Risk Screen:    BERENICE Fall Risk Assessment was completed, and patient is at HIGH risk for falls. Assessment completed on:12/14/2022    Depression Screening:  PHQ-2/PHQ-9 Depression Screening 12/14/2022   Retired Total Score -   Little Interest or Pleasure in Doing Things 0-->not at all   Feeling Down, Depressed or Hopeless 0-->not at all   PHQ-9: Brief Depression Severity Measure Score 0       Health Habits and Functional and Cognitive Screening:  Functional & Cognitive Status 12/14/2022   Do you have difficulty preparing food and eating? No   Do you have difficulty bathing yourself, getting dressed or grooming yourself? No   Do you have difficulty using the toilet? No   Do you have difficulty moving around from place to place? No   Do you have trouble with steps or getting out of a bed or a chair? No   Current Diet Unhealthy Diet   Dental Exam Up to date   Eye Exam Up to date   Exercise (times per week) 0 times per week   Current Exercises Include No Regular Exercise   Current Exercise Activities Include -   Do you need help using the phone?  No   Are you deaf or do you have serious difficulty hearing?  No "   Do you need help with transportation? No   Do you need help shopping? No   Do you need help preparing meals?  No   Do you need help with housework?  Yes   Do you need help with laundry? Yes   Do you need help taking your medications? No   Do you need help managing money? No   Do you ever drive or ride in a car without wearing a seat belt? No   Have you felt unusual stress, anger or loneliness in the last month? Yes   Who do you live with? Child   If you need help, do you have trouble finding someone available to you? Yes   Have you been bothered in the last four weeks by sexual problems? No   Do you have difficulty concentrating, remembering or making decisions? No       Age-appropriate Screening Schedule:  Refer to the list below for future screening recommendations based on patient's age, sex and/or medical conditions. Orders for these recommended tests are listed in the plan section. The patient has been provided with a written plan.    Health Maintenance   Topic Date Due   • ZOSTER VACCINE (1 of 2) Never done   • DIABETIC EYE EXAM  12/06/2019   • DXA SCAN  10/24/2020   • LIPID PANEL  04/29/2023   • URINE MICROALBUMIN  04/29/2023   • TDAP/TD VACCINES (2 - Tdap) 04/29/2023   • HEMOGLOBIN A1C  06/14/2023   • INFLUENZA VACCINE  Completed                CMS Preventative Services Quick Reference  Risk Factors Identified During Encounter  Chronic Pain: Natural history and expected course discussed. Questions answered.  Immunizations Discussed/Encouraged: Shingrix and COVID19  The above risks/problems have been discussed with the patient.  Pertinent information has been shared with the patient in the After Visit Summary.  An After Visit Summary and PPPS were made available to the patient.    Follow Up:   Next Medicare Wellness visit to be scheduled in 1 year.       Additional E&M Note during same encounter follows:  Patient has multiple medical problems which are significant and separately identifiable that require  "additional work above and beyond the Medicare Wellness Visit.      Chief Complaint  Medicare Wellness-subsequent (Numbness in both hands (mostly right) - getting worse/-refill all meds /)    Subjective        HPI  Samira Workman is also being seen today for numbness in both hands/arms.     Has had longstanding neuropathy in her foot due to tendon injury in the past. Has started noticing similar symptoms in hands. It is associated with mild  weakness and dropping items. No recent injuries.     Objective   Vital Signs:  /70 (BP Location: Right arm, Patient Position: Sitting, Cuff Size: Adult)   Pulse 75   Temp 97.6 °F (36.4 °C) (Infrared)   Ht 157 cm (61.81\")   Wt 70.3 kg (155 lb)   SpO2 98%   BMI 28.52 kg/m²     Physical Exam  Constitutional:       General: She is not in acute distress.  Eyes:      Conjunctiva/sclera: Conjunctivae normal.   Cardiovascular:      Rate and Rhythm: Normal rate and regular rhythm.   Pulmonary:      Effort: Pulmonary effort is normal. No respiratory distress.      Breath sounds: Normal breath sounds.   Skin:     General: Skin is warm and dry.   Neurological:      Mental Status: She is alert and oriented to person, place, and time.   Psychiatric:         Mood and Affect: Mood normal.         Behavior: Behavior normal.          The following data was reviewed by: Adalgisa Hdz MD on 12/14/2022:  Common labs    Common Labs 4/29/22 4/29/22 4/29/22 4/29/22 4/29/22 12/14/22 12/14/22    1311 1311 1311 1311 1311 1423 1423   Glucose   111 (A)   87    BUN   19   16    Creatinine   0.56 (A)   0.54 (A)    Sodium   140   140    Potassium   4.6   4.8    Chloride   100   102    Calcium   9.7   10.3    Total Protein   7.0   6.9    Albumin   4.5   4.50    Total Bilirubin   0.3   0.2    Alkaline Phosphatase   69   81    AST (SGOT)   25   20    ALT (SGPT)   22   18    WBC  6.9        Hemoglobin  14.3        Hematocrit  41.9        Platelets  273        Total Cholesterol    163      Triglycerides "    116      HDL Cholesterol    56      LDL Cholesterol     86      Hemoglobin A1C     6.6 (A)  6.60 (A)   Microalbumin, Urine 5.3         (A) Abnormal value       Comments are available for some flowsheets but are not being displayed.           Data reviewed: none           Assessment and Plan   Diagnoses and all orders for this visit:    1. Neuropathy (Primary)  Assessment & Plan:  Longstanding neuropathy in her legs. Believes that this is related to a tendon injury years ago.  Has begun to notice that she is also having symptoms in her hand - radial and ulnar distribution.   Will obtain TSH, B12, folate.   Sx's consistent with cervical radiculopathy and is associated with mild  weakness- will order CT C spine.     Orders:  -     TSH  -     Vitamin B12  -     Folate    2. Controlled type 2 diabetes mellitus with complication, without long-term current use of insulin (HCC)  Assessment & Plan:  Well controlled on current regimen -metformin 500mg daily.  Continue current regimen. Will order A1c today with labs.  On ACEi and statin.  Recommend yearly eye exams and daily foot exams.    Orders:  -     Comprehensive Metabolic Panel  -     ORDER: Hemoglobin A1c  -     metFORMIN (GLUCOPHAGE) 500 MG tablet; Take 1 tablet by mouth Daily.  Dispense: 90 tablet; Refill: 3    3. Cervical radiculopathy  -     CT cervical spine wo contrast; Future    4. Long term current use of anticoagulant therapy  -     Protime-INR    5. Weakness  -     TSH  -     CT cervical spine wo contrast; Future         Follow Up   No follow-ups on file.  Patient was given instructions and counseling regarding her condition or for health maintenance advice. Please see specific information pulled into the AVS if appropriate.

## 2022-12-17 NOTE — ASSESSMENT & PLAN NOTE
Longstanding neuropathy in her legs. Believes that this is related to a tendon injury years ago.  Has begun to notice that she is also having symptoms in her hand - radial and ulnar distribution.   Will obtain TSH, B12, folate.   Sx's consistent with cervical radiculopathy and is associated with mild  weakness- will order CT C spine.

## 2022-12-20 DIAGNOSIS — I48.0 PAROXYSMAL ATRIAL FIBRILLATION: ICD-10-CM

## 2022-12-20 RX ORDER — WARFARIN SODIUM 5 MG/1
TABLET ORAL
Qty: 60 TABLET | Refills: 3 | Status: SHIPPED | OUTPATIENT
Start: 2022-12-20

## 2022-12-20 NOTE — TELEPHONE ENCOUNTER
Caller: Samira Workman    Relationship: Self    Best call back number: [phone]    Requested Prescriptions:   Requested Prescriptions     Pending Prescriptions Disp Refills   • warfarin (Coumadin) 5 MG tablet 60 tablet 3     Sig: Take 1- 5mg tab 5 days weekly. Take 1-2.5mg tab 2 days weekly.        Pharmacy where request should be sent: [facility] - [facility] [address] [facility] - [phone]  - [phone] FX     Additional details provided by patient: PATIENT HAS 4 PILLS LEFT     Does the patient have less than a 3 day supply:  [] Yes  [x] No    Would you like a call back once the refill request has been completed: [x] Yes [] No    If the office needs to give you a call back, can they leave a voicemail: [x] Yes [] No    Yvette Paredes Rep   12/20/22 11:20 EST

## 2023-01-09 ENCOUNTER — HOSPITAL ENCOUNTER (OUTPATIENT)
Dept: CT IMAGING | Facility: HOSPITAL | Age: 78
Discharge: HOME | End: 2023-01-09
Admitting: STUDENT IN AN ORGANIZED HEALTH CARE EDUCATION/TRAINING PROGRAM
Payer: MEDICARE

## 2023-01-09 DIAGNOSIS — R53.1 WEAKNESS: ICD-10-CM

## 2023-01-09 DIAGNOSIS — M54.12 CERVICAL RADICULOPATHY: ICD-10-CM

## 2023-01-09 PROCEDURE — 72125 CT NECK SPINE W/O DYE: CPT

## 2023-01-11 DIAGNOSIS — M47.22 CERVICAL RADICULOPATHY DUE TO DEGENERATIVE JOINT DISEASE OF SPINE: Primary | ICD-10-CM

## 2023-01-11 NOTE — PROGRESS NOTES
Please call with results:     I have reviewed your CT scan and it does show that your symptoms are likely coming from arthritis of your neck. I would recommend you see a spine surgeon and will place a referral today. They will likely want an MRI as well so I will place that order too. If at any time you notice severe pain in your neck or weakness of your arms, please go immediately to the ER.

## 2023-01-27 ENCOUNTER — HOSPITAL ENCOUNTER (OUTPATIENT)
Dept: MRI IMAGING | Facility: HOSPITAL | Age: 78
Discharge: HOME | End: 2023-01-27
Admitting: STUDENT IN AN ORGANIZED HEALTH CARE EDUCATION/TRAINING PROGRAM
Payer: MEDICARE

## 2023-01-27 DIAGNOSIS — M47.22 CERVICAL RADICULOPATHY DUE TO DEGENERATIVE JOINT DISEASE OF SPINE: ICD-10-CM

## 2023-01-27 PROCEDURE — 72141 MRI NECK SPINE W/O DYE: CPT

## 2023-01-31 NOTE — PROGRESS NOTES
Please call with patient results:    MRI shows multilevel degenerative changes consistent with osteoarthritis of the neck. At the level of C5-C6 there is an area where the spinal cord is being very mildly compressed which is likely contributing to the symptoms she is having. I would recommend we start with physical therapy to see if that improves symptoms.

## 2023-03-01 RX ORDER — BENAZEPRIL HYDROCHLORIDE 10 MG/1
TABLET ORAL
Qty: 90 TABLET | Refills: 0 | Status: SHIPPED | OUTPATIENT
Start: 2023-03-01

## 2023-03-01 RX ORDER — PRAVASTATIN SODIUM 40 MG
TABLET ORAL
Qty: 90 TABLET | Refills: 0 | Status: SHIPPED | OUTPATIENT
Start: 2023-03-01

## 2023-04-10 ENCOUNTER — ANTICOAGULATION VISIT (OUTPATIENT)
Dept: FAMILY MEDICINE CLINIC | Facility: CLINIC | Age: 78
End: 2023-04-10
Payer: MEDICARE

## 2023-04-10 DIAGNOSIS — Z79.01 LONG TERM CURRENT USE OF ANTICOAGULANT THERAPY: Primary | ICD-10-CM

## 2023-04-10 LAB — INR PPP: 2.7 (ref 0.9–1.1)

## 2023-04-10 PROCEDURE — 85610 PROTHROMBIN TIME: CPT | Performed by: STUDENT IN AN ORGANIZED HEALTH CARE EDUCATION/TRAINING PROGRAM

## 2023-04-10 PROCEDURE — 36416 COLLJ CAPILLARY BLOOD SPEC: CPT | Performed by: STUDENT IN AN ORGANIZED HEALTH CARE EDUCATION/TRAINING PROGRAM

## 2023-05-10 DIAGNOSIS — F41.9 ANXIETY: ICD-10-CM

## 2023-05-10 DIAGNOSIS — F33.41 RECURRENT MAJOR DEPRESSION IN PARTIAL REMISSION: ICD-10-CM

## 2023-05-10 RX ORDER — CITALOPRAM 20 MG/1
TABLET ORAL
Qty: 90 TABLET | Refills: 0 | Status: SHIPPED | OUTPATIENT
Start: 2023-05-10

## 2023-06-09 ENCOUNTER — ANTICOAGULATION VISIT (OUTPATIENT)
Dept: FAMILY MEDICINE CLINIC | Facility: CLINIC | Age: 78
End: 2023-06-09
Payer: MEDICARE

## 2023-06-09 DIAGNOSIS — I48.0 PAROXYSMAL ATRIAL FIBRILLATION: Primary | ICD-10-CM

## 2023-06-09 LAB — INR PPP: 2.7 (ref 0.9–1.1)

## 2023-06-09 PROCEDURE — 85610 PROTHROMBIN TIME: CPT | Performed by: STUDENT IN AN ORGANIZED HEALTH CARE EDUCATION/TRAINING PROGRAM

## 2023-06-09 PROCEDURE — 36416 COLLJ CAPILLARY BLOOD SPEC: CPT | Performed by: STUDENT IN AN ORGANIZED HEALTH CARE EDUCATION/TRAINING PROGRAM

## 2023-07-27 ENCOUNTER — ANTICOAGULATION VISIT (OUTPATIENT)
Dept: FAMILY MEDICINE CLINIC | Facility: CLINIC | Age: 78
End: 2023-07-27
Payer: MEDICARE

## 2023-07-27 DIAGNOSIS — I48.0 PAROXYSMAL ATRIAL FIBRILLATION: Primary | ICD-10-CM

## 2023-07-27 LAB — INR PPP: 2.6 (ref 0.9–1.1)

## 2023-08-02 ENCOUNTER — OFFICE VISIT (OUTPATIENT)
Dept: CARDIOLOGY | Facility: CLINIC | Age: 78
End: 2023-08-02
Payer: MEDICARE

## 2023-08-02 VITALS
BODY MASS INDEX: 28.13 KG/M2 | RESPIRATION RATE: 16 BRPM | OXYGEN SATURATION: 99 % | HEART RATE: 86 BPM | SYSTOLIC BLOOD PRESSURE: 132 MMHG | DIASTOLIC BLOOD PRESSURE: 74 MMHG | HEIGHT: 61 IN | WEIGHT: 149 LBS

## 2023-08-02 DIAGNOSIS — I10 HYPERTENSION, ESSENTIAL: ICD-10-CM

## 2023-08-02 DIAGNOSIS — E78.2 MIXED HYPERLIPIDEMIA: ICD-10-CM

## 2023-08-02 DIAGNOSIS — I48.0 PAROXYSMAL ATRIAL FIBRILLATION: Primary | ICD-10-CM

## 2023-08-02 PROCEDURE — 93000 ELECTROCARDIOGRAM COMPLETE: CPT | Performed by: INTERNAL MEDICINE

## 2023-08-02 PROCEDURE — 1159F MED LIST DOCD IN RCRD: CPT | Performed by: INTERNAL MEDICINE

## 2023-08-02 PROCEDURE — 3075F SYST BP GE 130 - 139MM HG: CPT | Performed by: INTERNAL MEDICINE

## 2023-08-02 PROCEDURE — 1160F RVW MEDS BY RX/DR IN RCRD: CPT | Performed by: INTERNAL MEDICINE

## 2023-08-02 PROCEDURE — 3078F DIAST BP <80 MM HG: CPT | Performed by: INTERNAL MEDICINE

## 2023-08-02 PROCEDURE — 99204 OFFICE O/P NEW MOD 45 MIN: CPT | Performed by: INTERNAL MEDICINE

## 2023-08-24 ENCOUNTER — ANTICOAGULATION VISIT (OUTPATIENT)
Dept: FAMILY MEDICINE CLINIC | Facility: CLINIC | Age: 78
End: 2023-08-24
Payer: MEDICARE

## 2023-08-24 LAB — INR PPP: 3.7 (ref 0.9–1.1)

## 2023-09-07 ENCOUNTER — ANTICOAGULATION VISIT (OUTPATIENT)
Dept: FAMILY MEDICINE CLINIC | Facility: CLINIC | Age: 78
End: 2023-09-07
Payer: MEDICARE

## 2023-09-07 DIAGNOSIS — I48.0 PAROXYSMAL ATRIAL FIBRILLATION: Primary | ICD-10-CM

## 2023-09-07 LAB — INR PPP: 3 (ref 0.9–1.1)

## 2023-09-07 PROCEDURE — 36416 COLLJ CAPILLARY BLOOD SPEC: CPT | Performed by: STUDENT IN AN ORGANIZED HEALTH CARE EDUCATION/TRAINING PROGRAM

## 2023-09-07 PROCEDURE — 85610 PROTHROMBIN TIME: CPT | Performed by: STUDENT IN AN ORGANIZED HEALTH CARE EDUCATION/TRAINING PROGRAM

## 2023-09-27 DIAGNOSIS — F41.9 ANXIETY: ICD-10-CM

## 2023-09-27 DIAGNOSIS — I48.0 PAROXYSMAL ATRIAL FIBRILLATION: ICD-10-CM

## 2023-09-27 DIAGNOSIS — F33.41 RECURRENT MAJOR DEPRESSION IN PARTIAL REMISSION: ICD-10-CM

## 2023-09-27 RX ORDER — CITALOPRAM 20 MG/1
TABLET ORAL
Qty: 90 TABLET | Refills: 0 | Status: SHIPPED | OUTPATIENT
Start: 2023-09-27

## 2023-09-27 RX ORDER — WARFARIN SODIUM 5 MG/1
TABLET ORAL
Qty: 60 TABLET | Refills: 3 | Status: SHIPPED | OUTPATIENT
Start: 2023-09-27 | End: 2023-09-29 | Stop reason: SDUPTHER

## 2023-09-29 DIAGNOSIS — I48.0 PAROXYSMAL ATRIAL FIBRILLATION: ICD-10-CM

## 2023-09-29 RX ORDER — WARFARIN SODIUM 5 MG/1
TABLET ORAL
Qty: 60 TABLET | Refills: 3 | Status: SHIPPED | OUTPATIENT
Start: 2023-09-29 | End: 2023-09-29

## 2023-09-29 RX ORDER — WARFARIN SODIUM 2.5 MG/1
2.5 TABLET ORAL 2 TIMES WEEKLY
Qty: 24 TABLET | Refills: 3 | Status: SHIPPED | OUTPATIENT
Start: 2023-10-02 | End: 2023-09-29

## 2023-09-29 RX ORDER — WARFARIN SODIUM 5 MG/1
TABLET ORAL
Qty: 60 TABLET | Refills: 3 | Status: SHIPPED | OUTPATIENT
Start: 2023-09-29

## 2023-09-29 RX ORDER — WARFARIN SODIUM 2.5 MG/1
2.5 TABLET ORAL 2 TIMES WEEKLY
Qty: 24 TABLET | Refills: 3 | Status: SHIPPED | OUTPATIENT
Start: 2023-10-02

## 2023-10-05 ENCOUNTER — HOSPITAL ENCOUNTER (OUTPATIENT)
Dept: CARDIOLOGY | Facility: HOSPITAL | Age: 78
Discharge: HOME | End: 2023-10-05
Admitting: INTERNAL MEDICINE
Payer: MEDICARE

## 2023-10-05 ENCOUNTER — TELEPHONE (OUTPATIENT)
Dept: CARDIOLOGY | Facility: CLINIC | Age: 78
End: 2023-10-05
Payer: MEDICARE

## 2023-10-05 VITALS
RESPIRATION RATE: 18 BRPM | WEIGHT: 145 LBS | BODY MASS INDEX: 26.68 KG/M2 | HEART RATE: 86 BPM | DIASTOLIC BLOOD PRESSURE: 86 MMHG | OXYGEN SATURATION: 96 % | SYSTOLIC BLOOD PRESSURE: 116 MMHG | HEIGHT: 62 IN

## 2023-10-05 DIAGNOSIS — R07.2 PRECORDIAL PAIN: Primary | ICD-10-CM

## 2023-10-05 DIAGNOSIS — I48.0 PAROXYSMAL ATRIAL FIBRILLATION: ICD-10-CM

## 2023-10-05 DIAGNOSIS — I10 ESSENTIAL HYPERTENSION: ICD-10-CM

## 2023-10-05 LAB — TROPONIN T SERPL HS-MCNC: 9 NG/L

## 2023-10-05 PROCEDURE — 94760 N-INVAS EAR/PLS OXIMETRY 1: CPT

## 2023-10-05 PROCEDURE — 36415 COLL VENOUS BLD VENIPUNCTURE: CPT

## 2023-10-05 PROCEDURE — 93005 ELECTROCARDIOGRAM TRACING: CPT | Performed by: INTERNAL MEDICINE

## 2023-10-05 PROCEDURE — 84484 ASSAY OF TROPONIN QUANT: CPT

## 2023-10-05 RX ORDER — SODIUM CHLORIDE 0.9 % (FLUSH) 0.9 %
10 SYRINGE (ML) INJECTION AS NEEDED
Status: SHIPPED | OUTPATIENT
Start: 2023-10-05

## 2023-10-05 RX ORDER — NITROGLYCERIN 0.4 MG/1
0.4 TABLET SUBLINGUAL
Status: SHIPPED | OUTPATIENT
Start: 2023-10-05

## 2023-10-05 NOTE — PROGRESS NOTES
"Date of Hospital Visit: 10/05/23  Encounter Provider: Teja Sim MD  Place of Service: [facility] CARDIOLOGY  Patient Name: Samira Workman  :1945  Referral Provider: Gisela Sánchez MD    Chief complaint: chest pain    History of Present Illness    Ms. Workman is a 77yo woman who is followed by Dr Sánchez. She has a history of atrial fibrillation. She is on warfarin; she does not require a rate controlling agent. She has hypertension. She had a perfusion stress test in  for dyspnea; I reviewed the images. There was a breast attenuation artifact that was subtly worse with stress but nothing significant.     She has had episodic chest pain. It's usually on one side of sternum, is sharp, and may radiate across her lower chest. It's not exertional. She had a prolonged episode of pain along the edge of the lower left sternum upon waking today. It is non-radiating, and is not pleuritic. Getting up and moving around/walking doesn't change it. Eating doesn't change it. She's not short of breath or diaphoretic or nauseated. It improved after taking tramadol and acetaminophen.    She asks \"Do I really have atrial fibrillation?\" I noted that she was in it at her LOV and she is in it today. She is asymptomatic.       Past Medical History:   Diagnosis Date    Anxiety     Depression     Environmental allergies     Fall 2022    pt was in a stool and it wasn't leveled    Headache, tension-type     HL (hearing loss)     Hyperlipidemia     Hypertension     Low back pain     Neuropathy     Neuropathy in diabetes     OA (osteoarthritis)     Paroxysmal atrial fibrillation 2016    Rotator cuff tear     Type 2 diabetes mellitus        Past Surgical History:   Procedure Laterality Date    BACK SURGERY      X2    BLADDER SUSPENSION      CHOLECYSTECTOMY      HYSTERECTOMY  1970    KNEE ARTHROPLASTY Right     SHOULDER ARTHROSCOPY W/ ROTATOR CUFF REPAIR Left 2019    Procedure: Arthroscopic " rotator cuff repair with subacromial decompression, Arthroscopic distal clavicle excision, and arthroscopic labral debridement ;  Surgeon: Cresencio Christina MD;  Location: [facility];  Service: Orthopedics    SHOULDER SURGERY Right        Prior to Admission medications    Medication Sig Start Date End Date Taking? Authorizing Provider   benazepril (LOTENSIN) 10 MG tablet TAKE 1 TABLET EVERY DAY 7/19/23   Sharlene Cabrales APRN   BIOTIN PO Take  by mouth.    Dima Jasso MD   Calcium Carb-Cholecalciferol (CALCIUM 1000 + D PO) Take 2 tablets by mouth Daily.    Dima Jasso MD   citalopram (CeleXA) 20 MG tablet TAKE 1 TABLET EVERY DAY 9/27/23   Sharlene Cabrales APRN   cyclobenzaprine (FLEXERIL) 5 MG tablet TAKE 1 TABLET AT NIGHT AS NEEDED FOR MUSCLE SPASMS (PAIN) 10/4/22   Adalgisa Hdz MD   glucosamine-chondroitin 500-400 MG capsule capsule Take  by mouth 3 (Three) Times a Day With Meals.    Dima Jasso MD   melatonin 1 MG tablet Take 1 tablet by mouth.    Dima Jasso MD   metFORMIN (GLUCOPHAGE) 500 MG tablet Take 1 tablet by mouth Daily. 12/14/22   Adalgisa Hdz MD   Multiple Vitamin (MULTI VITAMIN DAILY PO) Take 1 tablet by mouth Daily.    Dima Jasso MD   pravastatin (PRAVACHOL) 40 MG tablet TAKE 1 TABLET EVERY DAY 7/19/23   Sharlene Cabrales APRN   traMADol ER (ULTRAM-ER) 100 MG 24 hr tablet Take 1 tablet by mouth Daily As Needed. 7/19/23   Dima Jasso MD   TURMERIC PO Take 1 tablet by mouth Daily.    Dima Jasso MD   warfarin (Coumadin) 2.5 MG tablet Take 1 tablet by mouth 2 (Two) Times a Week. 10/2/23   Adalgisa Hdz MD   warfarin (COUMADIN) 5 MG tablet TAKE 1 TAB (5MG) 5 TIMES WEEKLY. 9/29/23   Adalgisa Hdz MD       Social History     Socioeconomic History    Marital status:     Number of children: 2   Tobacco Use    Smoking status: Never    Smokeless tobacco: Never   Vaping Use    Vaping Use: Never used   Substance and  "Sexual Activity    Alcohol use: Yes     Comment: occasionally socially    Drug use: Never    Sexual activity: Defer       Family History   Problem Relation Age of Onset    Thyroid disease Mother     Anxiety disorder Mother     Depression Mother     Bipolar disorder Mother     Heart failure Father     Hypertension Father     Kidney nephrosis Brother     Malig Hyperthermia Neg Hx        Review of Systems   Cardiovascular:  Positive for chest pain.   All other systems reviewed and are negative.     Objective:     Vitals:    10/05/23 1350   BP: 116/86   BP Location: Left arm   Patient Position: Sitting   Pulse: 86   Resp: 18   SpO2: 96%   Weight: 65.8 kg (145 lb)   Height: 157.5 cm (62\")     Body mass index is 26.52 kg/m².      Physical Exam  Vitals reviewed.   Constitutional:       Appearance: She is well-developed.   HENT:      Head: Normocephalic.      Nose: Nose normal.      Mouth/Throat:      Pharynx: Oropharynx is clear.   Eyes:      Conjunctiva/sclera: Conjunctivae normal.   Neck:      Vascular: No JVD.   Cardiovascular:      Rate and Rhythm: Normal rate and regular rhythm. Rhythm irregularly irregular.      Heart sounds: Normal heart sounds.   Pulmonary:      Effort: Pulmonary effort is normal.      Breath sounds: Normal breath sounds.   Abdominal:      Palpations: Abdomen is soft.      Tenderness: There is no abdominal tenderness.   Musculoskeletal:         General: Normal range of motion.      Cervical back: Normal range of motion.   Skin:     General: Skin is warm and dry.      Findings: No erythema.   Neurological:      General: No focal deficit present.      Mental Status: She is alert.      Cranial Nerves: No cranial nerve deficit.   Psychiatric:         Mood and Affect: Mood normal.               Lab Review:                    Results from last 7 days   Lab Units 10/05/23  1456   HSTROP T ng/L 9             Lab Results   Component Value Date    TSH 2.680 12/14/2022     Lab Results   Component Value Date "    DDIMERQUANT 0.30 06/22/2020     No results found for: PROBNP, NTPROBNP, BNP      ECG 12 Lead    Date/Time: 10/5/2023 3:51 PM  Performed by: Teja Sim MD  Authorized by: Teja Sim MD   Comparison: compared with previous ECG   Similar to previous ECG  Rhythm: atrial fibrillation  Conduction: conduction normal  ST Segments: ST segments normal  T Waves: T waves normal  QRS axis: normal  Other: no other findings    Clinical impression: abnormal EKG          Assessment/Plan:     1. Precordial pain    2. Paroxysmal atrial fibrillation    3. Essential hypertension      1. Her chest pain is very atypical.  It is located right in one spot along the lower edge of the sternum.  It is nonexertional.  By the time I had seen her, the pain had been going on for at least 6 hours.  Her EKG is nonischemic.  A high-sensitivity troponin was checked and was normal.  I do not feel that she needs additional ischemic testing.  She had a low risk stress test last year.    2.  She seems a bit dubious as to her diagnosis of atrial fibrillation.  She inquires if she really has to take warfarin.  I explained that it looks as though she is actually in persistent if not permanent atrial fibrillation.  It is asymptomatic.  I would recommend that she stay on warfarin as currently prescribed.    3.  Her blood pressure is extremely well controlled on benazepril.    She will keep her usual follow up in August 2024.

## 2023-10-05 NOTE — TELEPHONE ENCOUNTER
Reviewed recommendations with Samira Workman and patient is agreeable to this plan.  Patient reports she just ate lunch and has had a cup of tea today.  Requested patient remain NPO from this point forward and patient stated she will do this.  Patient estimates she will be here by 1 pm.    Notified [place] in [facility] of incoming patient.     Thank you,  Glenis OREILLY RN  Triage Nurse [facility]   12:27 EDT

## 2023-10-05 NOTE — TELEPHONE ENCOUNTER
"Samira Workman called to report chest pain.    Patient reports she woke up at 8:30 am today with pain in her left chest.  Patient describes pain as a \"constant ache\" that is occasionally \"shooting\" in nature.  Pain radiates up through her breast.  Patient stated the pain is not effected by activity level.  Patient denies any other symptoms and stated it maybe be \"a little worse\" now than it was this morning.  Patient stated she has experienced this in the past, but did not seek care when it occurred.    Patient wears a Fitbit watch and stated that it is not alerting for atrial fibrillation.    HR 95    Cardiac Meds  Warfarin  Benazepril 10 mg, daily    Patient is asking for further recommendations.  There are appointments available today with APRN's if appropriate.    Please let me know how you would like to proceed.    Thank you,  Glenis OREILLY RN  Triage Nurse [facility]   12:02 EDT    "

## 2023-10-06 NOTE — ADDENDUM NOTE
Encounter addended by: Argentina Kirkpatrick RN on: 10/6/2023 9:24 AM   Actions taken: Charge Capture section accepted

## 2023-10-09 ENCOUNTER — FLU SHOT (OUTPATIENT)
Dept: FAMILY MEDICINE CLINIC | Facility: CLINIC | Age: 78
End: 2023-10-09
Payer: MEDICARE

## 2023-10-09 DIAGNOSIS — Z23 NEED FOR VACCINATION: Primary | ICD-10-CM

## 2023-10-09 DIAGNOSIS — I48.0 PAROXYSMAL ATRIAL FIBRILLATION: Primary | ICD-10-CM

## 2023-10-09 PROCEDURE — 90662 IIV NO PRSV INCREASED AG IM: CPT | Performed by: STUDENT IN AN ORGANIZED HEALTH CARE EDUCATION/TRAINING PROGRAM

## 2023-10-09 PROCEDURE — G0008 ADMIN INFLUENZA VIRUS VAC: HCPCS | Performed by: STUDENT IN AN ORGANIZED HEALTH CARE EDUCATION/TRAINING PROGRAM

## 2023-10-10 LAB
INR PPP: 3.06 (ref 0.9–1.1)
PROTHROMBIN TIME: 32.3 SECONDS (ref 11.7–14.2)

## 2023-11-21 ENCOUNTER — ANTICOAGULATION VISIT (OUTPATIENT)
Dept: FAMILY MEDICINE CLINIC | Facility: CLINIC | Age: 78
End: 2023-11-21
Payer: MEDICARE

## 2023-11-21 DIAGNOSIS — I48.0 PAROXYSMAL ATRIAL FIBRILLATION: Primary | ICD-10-CM

## 2023-11-21 LAB — INR PPP: 2.8 (ref 0.9–1.1)

## 2023-11-21 PROCEDURE — 36416 COLLJ CAPILLARY BLOOD SPEC: CPT | Performed by: STUDENT IN AN ORGANIZED HEALTH CARE EDUCATION/TRAINING PROGRAM

## 2023-11-21 PROCEDURE — 85610 PROTHROMBIN TIME: CPT | Performed by: STUDENT IN AN ORGANIZED HEALTH CARE EDUCATION/TRAINING PROGRAM

## 2023-12-18 RX ORDER — PRAVASTATIN SODIUM 40 MG
TABLET ORAL
Qty: 90 TABLET | Refills: 3 | Status: SHIPPED | OUTPATIENT
Start: 2023-12-18

## 2023-12-18 RX ORDER — BENAZEPRIL HYDROCHLORIDE 10 MG/1
TABLET ORAL
Qty: 90 TABLET | Refills: 3 | Status: SHIPPED | OUTPATIENT
Start: 2023-12-18

## 2023-12-18 NOTE — TELEPHONE ENCOUNTER
Please schedule appt w PCP    Rx Refill Note  Requested Prescriptions     Pending Prescriptions Disp Refills    pravastatin (PRAVACHOL) 40 MG tablet [Pharmacy Med Name: PRAVASTATIN SODIUM 40 MG Tablet] 90 tablet 3     Sig: TAKE 1 TABLET EVERY DAY    benazepril (LOTENSIN) 10 MG tablet [Pharmacy Med Name: BENAZEPRIL HCL 10 MG Tablet] 90 tablet 3     Sig: TAKE 1 TABLET EVERY DAY      Last office visit with prescribing clinician: 12/14/2022   Last telemedicine visit with prescribing clinician: Visit date not found   Next office visit with prescribing clinician: Visit date not found                         Would you like a call back once the refill request has been completed: [] Yes [] No    If the office needs to give you a call back, can they leave a voicemail: [] Yes [] No    Kisrten Perry CMA/LMR  12/18/23, 07:54 EST

## 2024-01-08 ENCOUNTER — ANTICOAGULATION VISIT (OUTPATIENT)
Dept: FAMILY MEDICINE CLINIC | Facility: CLINIC | Age: 79
End: 2024-01-08
Payer: MEDICARE

## 2024-01-08 DIAGNOSIS — I48.0 PAROXYSMAL ATRIAL FIBRILLATION: Primary | ICD-10-CM

## 2024-01-08 LAB — INR PPP: 2.7 (ref 0.9–1.1)

## 2024-01-08 PROCEDURE — 85610 PROTHROMBIN TIME: CPT | Performed by: STUDENT IN AN ORGANIZED HEALTH CARE EDUCATION/TRAINING PROGRAM

## 2024-01-08 PROCEDURE — 36416 COLLJ CAPILLARY BLOOD SPEC: CPT | Performed by: STUDENT IN AN ORGANIZED HEALTH CARE EDUCATION/TRAINING PROGRAM

## 2024-01-17 DIAGNOSIS — E11.8 CONTROLLED TYPE 2 DIABETES MELLITUS WITH COMPLICATION, WITHOUT LONG-TERM CURRENT USE OF INSULIN: ICD-10-CM

## 2024-01-17 NOTE — TELEPHONE ENCOUNTER
Rx Refill Note  Requested Prescriptions     Pending Prescriptions Disp Refills    metFORMIN (GLUCOPHAGE) 500 MG tablet [Pharmacy Med Name: METFORMIN HYDROCHLORIDE 500 MG Tablet] 90 tablet 3     Sig: TAKE 1 TABLET EVERY DAY      Last office visit with prescribing clinician: 12/14/2022   Last telemedicine visit with prescribing clinician: Visit date not found   Next office visit with prescribing clinician: 1/22/2024                         Would you like a call back once the refill request has been completed: [] Yes [] No    If the office needs to give you a call back, can they leave a voicemail: [] Yes [] No    Mono Rodriguez CMA/LMR  01/17/24, 07:59 EST

## 2024-01-23 ENCOUNTER — OFFICE VISIT (OUTPATIENT)
Dept: FAMILY MEDICINE CLINIC | Facility: CLINIC | Age: 79
End: 2024-01-23
Payer: MEDICARE

## 2024-01-23 VITALS
OXYGEN SATURATION: 98 % | BODY MASS INDEX: 26.13 KG/M2 | HEART RATE: 73 BPM | TEMPERATURE: 97.1 F | WEIGHT: 142 LBS | SYSTOLIC BLOOD PRESSURE: 112 MMHG | HEIGHT: 62 IN | DIASTOLIC BLOOD PRESSURE: 66 MMHG

## 2024-01-23 DIAGNOSIS — I48.0 PAROXYSMAL ATRIAL FIBRILLATION: ICD-10-CM

## 2024-01-23 DIAGNOSIS — G62.9 NEUROPATHY: ICD-10-CM

## 2024-01-23 DIAGNOSIS — Z13.0 SCREENING FOR DEFICIENCY ANEMIA: ICD-10-CM

## 2024-01-23 DIAGNOSIS — E11.8 CONTROLLED TYPE 2 DIABETES MELLITUS WITH COMPLICATION, WITHOUT LONG-TERM CURRENT USE OF INSULIN: ICD-10-CM

## 2024-01-23 DIAGNOSIS — Z13.6 SCREENING FOR ISCHEMIC HEART DISEASE: ICD-10-CM

## 2024-01-23 DIAGNOSIS — Z00.00 MEDICARE ANNUAL WELLNESS VISIT, SUBSEQUENT: Primary | ICD-10-CM

## 2024-01-23 RX ORDER — NALOXEGOL OXALATE 25 MG/1
TABLET, FILM COATED ORAL
COMMUNITY
Start: 2023-12-13

## 2024-01-23 NOTE — PROGRESS NOTES
The ABCs of the Annual Wellness Visit  Subsequent Medicare Wellness Visit    Chief Complaint   Patient presents with    Labs Only     Full set     Neurologic Problem     Discuss neuropathy and tramadol (causing constipation)    Heart Problem     Pt requested a referral for cardiologist  -patrica iglesias       Subjective    History of Present Illness:  Samira Workman is a 79 y.o. female who presents for a Subsequent Medicare Wellness Visit.    The following portions of the patient's history were reviewed and   updated as appropriate: allergies, current medications, past family history, past medical history, past social history, past surgical history, and problem list.    Compared to one year ago, the patient feels her physical   health is the same.    Compared to one year ago, the patient feels her mental   health is the same.    Recent Hospitalizations:  She was not admitted to the hospital during the last year.       Current Medical Providers:  Patient Care Team:  Adalgisa Hdz MD as PCP - General (Family Medicine)    Outpatient Medications Prior to Visit   Medication Sig Dispense Refill    benazepril (LOTENSIN) 10 MG tablet TAKE 1 TABLET EVERY DAY 90 tablet 3    Calcium Carb-Cholecalciferol (CALCIUM 1000 + D PO) Take 2 tablets by mouth Daily.      citalopram (CeleXA) 20 MG tablet TAKE 1 TABLET EVERY DAY 90 tablet 0    cyclobenzaprine (FLEXERIL) 5 MG tablet TAKE 1 TABLET AT NIGHT AS NEEDED FOR MUSCLE SPASMS (PAIN) 15 tablet 0    glucosamine-chondroitin 500-400 MG capsule capsule Take  by mouth 3 (Three) Times a Day With Meals.      melatonin 1 MG tablet Take 1 tablet by mouth.      metFORMIN (GLUCOPHAGE) 500 MG tablet TAKE 1 TABLET EVERY DAY 90 tablet 3    Movantik 25 MG tablet       Multiple Vitamin (MULTI VITAMIN DAILY PO) Take 1 tablet by mouth Daily.      mupirocin (BACTROBAN) 2 % ointment Apply 1 application  topically to the appropriate area as directed 3 (Three) Times a Day. 15 g 0    nystatin-triamcinolone  (MYCOLOG II) 436220-8.1 UNIT/GM-% cream APPLY TOPICALLY TO THE AFFECTED AREA TWICE DAILY X 14 DAYS      pravastatin (PRAVACHOL) 40 MG tablet TAKE 1 TABLET EVERY DAY 90 tablet 3    traMADol ER (ULTRAM-ER) 100 MG 24 hr tablet Take 1 tablet by mouth Daily As Needed.      TURMERIC PO Take 1 tablet by mouth Daily.      warfarin (Coumadin) 2.5 MG tablet Take 1 tablet by mouth 2 (Two) Times a Week. 24 tablet 3    warfarin (COUMADIN) 5 MG tablet TAKE 1 TAB (5MG) 5 TIMES WEEKLY. 60 tablet 3    BIOTIN PO Take  by mouth.       Facility-Administered Medications Prior to Visit   Medication Dose Route Frequency Provider Last Rate Last Admin    nitroglycerin (NITROSTAT) SL tablet 0.4 mg  0.4 mg Sublingual Q5 Min PRN Teja Sim MD        sodium chloride 0.9 % flush 10 mL  10 mL Intravenous PRN Teja Sim MD           Opioid medication/s are on active medication list.  and I have evaluated her active treatment plan and pain score trends (see table).  Vitals:    01/23/24 1310   PainSc: 0-No pain     I have reviewed the chart for potential of high risk medication and harmful drug interactions in the elderly.          Aspirin is not on active medication list.  Aspirin use is not indicated based on review of current medical condition/s. Risk of harm outweighs potential benefits.  .    Patient Active Problem List   Diagnosis    BMI 28.0-28.9,adult    Chronic low back pain without sciatica    Controlled type 2 diabetes mellitus with complication, without long-term current use of insulin    Herniated nucleus pulposus, L5-S1    Hypertension, essential    Long term current use of anticoagulant therapy    Mixed hyperlipidemia    Osteopenia    Paroxysmal atrial fibrillation    Vitamin D deficiency    Recurrent major depression in partial remission    Neuropathy    Insomnia    Anxiety    Nontraumatic tear of rotator cuff    Rupture of tibialis anterior tendon    Shortness of breath    Medicare annual wellness visit, subsequent  "    Advance Care Planning  Advance Directive is on file.  ACP discussion was held with the patient during this visit. Patient has an advance directive in EMR which is still valid.           Objective    Vitals:    01/23/24 1310   BP: 112/66   BP Location: Right arm   Patient Position: Sitting   Cuff Size: Adult   Pulse: 73   Temp: 97.1 °F (36.2 °C)   TempSrc: Infrared   SpO2: 98%   Weight: 64.4 kg (142 lb)   Height: 157.5 cm (62.01\")   PainSc: 0-No pain     Estimated body mass index is 25.97 kg/m² as calculated from the following:    Height as of this encounter: 157.5 cm (62.01\").    Weight as of this encounter: 64.4 kg (142 lb).    BMI is >= 25 and <30. (Overweight) The following options were offered after discussion;: exercise counseling/recommendations and nutrition counseling/recommendations      Does the patient have evidence of cognitive impairment? No    Physical Exam  Constitutional:       General: She is not in acute distress.     Appearance: Normal appearance. She is not ill-appearing, toxic-appearing or diaphoretic.   HENT:      Head: Normocephalic and atraumatic.   Eyes:      General: No scleral icterus.        Right eye: No discharge.         Left eye: No discharge.      Extraocular Movements: Extraocular movements intact.   Cardiovascular:      Rate and Rhythm: Normal rate. Rhythm irregular.      Heart sounds: Normal heart sounds.   Pulmonary:      Effort: Pulmonary effort is normal. No respiratory distress.   Musculoskeletal:      Right lower leg: No edema.      Left lower leg: No edema.   Neurological:      General: No focal deficit present.      Mental Status: She is alert and oriented to person, place, and time.      Motor: No weakness.      Gait: Gait normal.   Psychiatric:         Mood and Affect: Mood normal.         Behavior: Behavior normal.       Lab Results   Component Value Date    CHLPL 153 01/23/2024    TRIG 70 01/23/2024    HDL 65 (H) 01/23/2024    LDL 74 01/23/2024    VLDL 14 01/23/2024 "    HGBA1C 6.30 (H) 2024            HEALTH RISK ASSESSMENT    Smoking Status:  Social History     Tobacco Use   Smoking Status Never   Smokeless Tobacco Never     Alcohol Consumption:  Social History     Substance and Sexual Activity   Alcohol Use Yes    Comment: occasionally socially     Fall Risk Screen:    BERENICE Fall Risk Assessment was completed, and patient is at LOW risk for falls.Assessment completed on:2024    Depression Screenin/23/2024     1:13 PM   PHQ-2/PHQ-9 Depression Screening   Little Interest or Pleasure in Doing Things 0-->not at all   Feeling Down, Depressed or Hopeless 0-->not at all   PHQ-9: Brief Depression Severity Measure Score 0       Health Habits and Functional and Cognitive Screenin/26/2024     4:00 PM   Functional & Cognitive Status   Do you have difficulty preparing food and eating? No   Do you have difficulty bathing yourself, getting dressed or grooming yourself? No   Do you have difficulty using the toilet? No   Do you have difficulty moving around from place to place? No   Do you have trouble with steps or getting out of a bed or a chair? No   Current Diet Well Balanced Diet   Dental Exam Up to date   Eye Exam Up to date   Exercise (times per week) 0 times per week   Current Exercises Include No Regular Exercise   Do you need help using the phone?  No   Are you deaf or do you have serious difficulty hearing?  No   Do you need help to go to places out of walking distance? No   Do you need help shopping? No   Do you need help preparing meals?  No   Do you need help with housework?  Yes   Do you need help with laundry? Yes   Do you need help taking your medications? No   Do you need help managing money? No   Do you ever drive or ride in a car without wearing a seat belt? No   Have you felt unusual stress, anger or loneliness in the last month? Yes   Who do you live with? Child   If you need help, do you have trouble finding someone available to you? Yes    Have you been bothered in the last four weeks by sexual problems? No   Do you have difficulty concentrating, remembering or making decisions? No       Age-appropriate Screening Schedule:  Refer to the list below for future screening recommendations based on patient's age, sex and/or medical conditions. Orders for these recommended tests are listed in the plan section. The patient has been provided with a written plan.    Health Maintenance   Topic Date Due    Pneumococcal Vaccine 65+ (1 of 2 - PCV) 01/14/1951    ZOSTER VACCINE (1 of 2) Never done    HEPATITIS C SCREENING  Never done    DIABETIC EYE EXAM  12/06/2019    DXA SCAN  10/24/2020    TDAP/TD VACCINES (2 - Tdap) 04/29/2023    COVID-19 Vaccine (7 - 2023-24 season) 09/01/2023    ANNUAL WELLNESS VISIT  12/14/2023    HEMOGLOBIN A1C  07/23/2024    LIPID PANEL  01/23/2025    URINE MICROALBUMIN  01/23/2025    BMI FOLLOWUP  01/23/2025    INFLUENZA VACCINE  Completed    COLORECTAL CANCER SCREENING  Discontinued              Assessment & Plan   CMS Preventative Services Quick Reference  Risk Factors Identified During Encounter  Immunizations Discussed/Encouraged: Prevnar 20 (Pneumococcal 20-valent conjugate) and Shingrix  Dental Screening Recommended  Vision Screening Recommended  The above risks/problems have been discussed with the patient.  Follow up actions/plans if indicated are seen below in the Assessment/Plan Section.  Pertinent information has been shared with the patient in the After Visit Summary.    Diagnoses and all orders for this visit:    1. Medicare annual wellness visit, subsequent (Primary)    2. Screening for deficiency anemia  -     CBC Auto Differential    3. Screening for ischemic heart disease  -     Lipid Panel    4. Controlled type 2 diabetes mellitus with complication, without long-term current use of insulin  -     Comprehensive Metabolic Panel  -     ORDER: Hemoglobin A1c  -     MicroAlbumin, Urine, Random - Urine, Clean Catch    5.  Paroxysmal atrial fibrillation  -     TSH  -     Ambulatory Referral to Cardiology    6. Neuropathy  -     Vitamin B12  -     Folate    Other orders  -     CBC & Differential        Follow Up:   No follow-ups on file.     An After Visit Summary and PPPS were made available to the patient.

## 2024-01-24 LAB
ALBUMIN SERPL-MCNC: 4.5 G/DL (ref 3.5–5.2)
ALBUMIN/GLOB SERPL: 1.8 G/DL
ALP SERPL-CCNC: 93 U/L (ref 39–117)
ALT SERPL-CCNC: 19 U/L (ref 1–33)
AST SERPL-CCNC: 22 U/L (ref 1–32)
BASOPHILS # BLD AUTO: 0.05 10*3/MM3 (ref 0–0.2)
BASOPHILS NFR BLD AUTO: 0.6 % (ref 0–1.5)
BILIRUB SERPL-MCNC: 0.3 MG/DL (ref 0–1.2)
BUN SERPL-MCNC: 18 MG/DL (ref 8–23)
BUN/CREAT SERPL: 28.1 (ref 7–25)
CALCIUM SERPL-MCNC: 9.3 MG/DL (ref 8.6–10.5)
CHLORIDE SERPL-SCNC: 98 MMOL/L (ref 98–107)
CHOLEST SERPL-MCNC: 153 MG/DL (ref 0–200)
CO2 SERPL-SCNC: 27.4 MMOL/L (ref 22–29)
CREAT SERPL-MCNC: 0.64 MG/DL (ref 0.57–1)
EGFRCR SERPLBLD CKD-EPI 2021: 90 ML/MIN/1.73
EOSINOPHIL # BLD AUTO: 0.29 10*3/MM3 (ref 0–0.4)
EOSINOPHIL NFR BLD AUTO: 3.5 % (ref 0.3–6.2)
ERYTHROCYTE [DISTWIDTH] IN BLOOD BY AUTOMATED COUNT: 12.1 % (ref 12.3–15.4)
FOLATE SERPL-MCNC: >20 NG/ML (ref 4.78–24.2)
GLOBULIN SER CALC-MCNC: 2.5 GM/DL
GLUCOSE SERPL-MCNC: 90 MG/DL (ref 65–99)
HBA1C MFR BLD: 6.3 % (ref 4.8–5.6)
HCT VFR BLD AUTO: 44.3 % (ref 34–46.6)
HDLC SERPL-MCNC: 65 MG/DL (ref 40–60)
HGB BLD-MCNC: 14.7 G/DL (ref 12–15.9)
IMM GRANULOCYTES # BLD AUTO: 0.02 10*3/MM3 (ref 0–0.05)
IMM GRANULOCYTES NFR BLD AUTO: 0.2 % (ref 0–0.5)
LDLC SERPL CALC-MCNC: 74 MG/DL (ref 0–100)
LYMPHOCYTES # BLD AUTO: 2.86 10*3/MM3 (ref 0.7–3.1)
LYMPHOCYTES NFR BLD AUTO: 34.6 % (ref 19.6–45.3)
MCH RBC QN AUTO: 30.1 PG (ref 26.6–33)
MCHC RBC AUTO-ENTMCNC: 33.2 G/DL (ref 31.5–35.7)
MCV RBC AUTO: 90.6 FL (ref 79–97)
MICROALBUMIN UR-MCNC: 6.2 UG/ML
MONOCYTES # BLD AUTO: 0.56 10*3/MM3 (ref 0.1–0.9)
MONOCYTES NFR BLD AUTO: 6.8 % (ref 5–12)
NEUTROPHILS # BLD AUTO: 4.49 10*3/MM3 (ref 1.7–7)
NEUTROPHILS NFR BLD AUTO: 54.3 % (ref 42.7–76)
NRBC BLD AUTO-RTO: 0 /100 WBC (ref 0–0.2)
PLATELET # BLD AUTO: 297 10*3/MM3 (ref 140–450)
POTASSIUM SERPL-SCNC: 4.7 MMOL/L (ref 3.5–5.2)
PROT SERPL-MCNC: 7 G/DL (ref 6–8.5)
RBC # BLD AUTO: 4.89 10*6/MM3 (ref 3.77–5.28)
SODIUM SERPL-SCNC: 140 MMOL/L (ref 136–145)
TRIGL SERPL-MCNC: 70 MG/DL (ref 0–150)
TSH SERPL DL<=0.005 MIU/L-ACNC: 3.23 UIU/ML (ref 0.27–4.2)
VIT B12 SERPL-MCNC: 1258 PG/ML (ref 211–946)
VLDLC SERPL CALC-MCNC: 14 MG/DL (ref 5–40)
WBC # BLD AUTO: 8.27 10*3/MM3 (ref 3.4–10.8)

## 2024-01-24 NOTE — PROGRESS NOTES
Please let her know that labs show:    Blood sugar of 6.3% which is improved from previous. All other labs are normal.     No changes needed to medication or plan at this time.

## 2024-01-26 PROBLEM — Z00.00 MEDICARE ANNUAL WELLNESS VISIT, SUBSEQUENT: Status: ACTIVE | Noted: 2024-01-26

## 2024-02-08 ENCOUNTER — ANTICOAGULATION VISIT (OUTPATIENT)
Dept: FAMILY MEDICINE CLINIC | Facility: CLINIC | Age: 79
End: 2024-02-08
Payer: MEDICARE

## 2024-02-08 DIAGNOSIS — Z79.01 LONG TERM CURRENT USE OF ANTICOAGULANT THERAPY: Primary | ICD-10-CM

## 2024-02-08 LAB — INR PPP: 2.3 (ref 0.9–1.1)

## 2024-02-12 ENCOUNTER — TELEPHONE (OUTPATIENT)
Dept: FAMILY MEDICINE CLINIC | Facility: CLINIC | Age: 79
End: 2024-02-12

## 2024-02-12 NOTE — TELEPHONE ENCOUNTER
"Caller: Samira Workman \"Yenny\"    Relationship to patient: Self    Best call back number: [phone]      Patient is needing: PATIENT STATES THAT SHE WOULD LIKE DR. MAGNOLIA HICKEY TO PUT IN REFERRAL OF A EAR NOSE THROAT SPECIALIST WITHIN THE [facility] FOR HER DRIPPING NOSE.    SHE ALSO HAS A QUESTION REGARDING HER [facility] PROVIDER.      SHE REQUESTS A CALLBACK TO DISCUSS.    PLEASE ADVISE.        "

## 2024-02-25 DIAGNOSIS — F41.9 ANXIETY: ICD-10-CM

## 2024-02-25 DIAGNOSIS — F33.41 RECURRENT MAJOR DEPRESSION IN PARTIAL REMISSION: ICD-10-CM

## 2024-02-26 RX ORDER — CITALOPRAM 20 MG/1
TABLET ORAL
Qty: 90 TABLET | Refills: 3 | Status: SHIPPED | OUTPATIENT
Start: 2024-02-26

## 2024-03-12 ENCOUNTER — ANTICOAGULATION VISIT (OUTPATIENT)
Dept: FAMILY MEDICINE CLINIC | Facility: CLINIC | Age: 79
End: 2024-03-12
Payer: MEDICARE

## 2024-03-12 DIAGNOSIS — Z79.01 LONG TERM CURRENT USE OF ANTICOAGULANT THERAPY: Primary | ICD-10-CM

## 2024-03-12 LAB — INR PPP: 2.8 (ref 0.9–1.1)

## 2024-04-01 NOTE — PROGRESS NOTES
RM:________     PCP: Adalgisa Hdz MD    : 1945  AGE: 79 y.o.  EST PATIENT     REASON FOR VISIT/  CC:        BP Readings from Last 3 Encounters:   24 112/66   10/05/23 116/86   23 132/74      Wt Readings from Last 3 Encounters:   24 64.4 kg (142 lb)   10/05/23 65.8 kg (145 lb)   23 67.6 kg (149 lb)        WT: ____________ BP: __________L __________R HR______    CHEST PAIN: _____________    SOA: _____________PALPS: _______________     LIGHTHEADED: ___________FATIGUE: ________________ EDEMA __________    ALLERGIES:Ciprofibrate, Ciprofloxacin, Contrast dye (echo or unknown ct/mr), Iodinated contrast media, Gabapentin, and Sulfa antibiotics SMOKING HISTORY:  Social History     Tobacco Use    Smoking status: Never    Smokeless tobacco: Never   Vaping Use    Vaping status: Never Used   Substance Use Topics    Alcohol use: Yes     Comment: occasionally socially    Drug use: Never     CAFFEINE USE_________________  ALCOHOL ______________________

## 2024-04-05 ENCOUNTER — OFFICE VISIT (OUTPATIENT)
Dept: CARDIOLOGY | Facility: CLINIC | Age: 79
End: 2024-04-05
Payer: MEDICARE

## 2024-04-05 VITALS
HEIGHT: 62 IN | HEART RATE: 79 BPM | WEIGHT: 141 LBS | BODY MASS INDEX: 25.95 KG/M2 | SYSTOLIC BLOOD PRESSURE: 112 MMHG | DIASTOLIC BLOOD PRESSURE: 76 MMHG

## 2024-04-05 DIAGNOSIS — R07.89 CHEST PAIN, ATYPICAL: ICD-10-CM

## 2024-04-05 DIAGNOSIS — I48.11 LONGSTANDING PERSISTENT ATRIAL FIBRILLATION: Primary | ICD-10-CM

## 2024-04-05 DIAGNOSIS — M47.812 CERVICAL SPINE ARTHRITIS: ICD-10-CM

## 2024-04-05 DIAGNOSIS — I10 HYPERTENSION, ESSENTIAL: ICD-10-CM

## 2024-04-05 PROBLEM — R06.02 SHORTNESS OF BREATH: Status: RESOLVED | Noted: 2022-09-12 | Resolved: 2024-04-05

## 2024-04-05 PROCEDURE — 3078F DIAST BP <80 MM HG: CPT | Performed by: INTERNAL MEDICINE

## 2024-04-05 PROCEDURE — 3074F SYST BP LT 130 MM HG: CPT | Performed by: INTERNAL MEDICINE

## 2024-04-05 PROCEDURE — 93000 ELECTROCARDIOGRAM COMPLETE: CPT | Performed by: INTERNAL MEDICINE

## 2024-04-05 PROCEDURE — 99214 OFFICE O/P EST MOD 30 MIN: CPT | Performed by: INTERNAL MEDICINE

## 2024-04-05 PROCEDURE — 1160F RVW MEDS BY RX/DR IN RCRD: CPT | Performed by: INTERNAL MEDICINE

## 2024-04-05 PROCEDURE — 1159F MED LIST DOCD IN RCRD: CPT | Performed by: INTERNAL MEDICINE

## 2024-04-05 RX ORDER — TRAMADOL HYDROCHLORIDE 200 MG/1
TABLET, EXTENDED RELEASE ORAL DAILY
COMMUNITY
Start: 2024-03-27

## 2024-04-05 NOTE — PROGRESS NOTES
Date of Office Visit: 24  Encounter Provider: Teja Sim MD  Place of Service: [facility] CARDIOLOGY  Patient Name: Samira Workman  :1945    Chief Complaint   Patient presents with    Atrial Fibrillation      HPI:     Ms. Workman is 79 y.o. and presents today in follow up. I have reviewed prior notes and there are no changes except for any new updates described below. I have also reviewed any information entered into the medical record by the patient or by ancillary staff.     She has a history of atrial fibrillation. She is on warfarin; she does not require a rate controlling agent. She has hypertension. She had a perfusion stress test in  for dyspnea; I reviewed the images. There was a breast attenuation artifact that was subtly worse with stress but nothing significant.     She was seen in our office in 2023 for extremely atypical chest pain; I did not feel that it required ischemic evaluation.     She has terrible bilateral upper extremity numbness and has been diagnosed with severe multi level cervical spine disease.  She would like to have an epidural spinal injection but there has been some miscommunication or disagreement about how long it is safe to hold warfarin prior to this procedure.  Of note, her warfarin is managed by her primary care physician, Dr. Hdz, as their office is so close to her house.  She did previously try to take apixaban but it was cost prohibitive.    She had one episode of sharp chest pain that lasted minutes recently but has not had any exertional chest discomfort or shortness of breath.  When she pressed on her chest wall at the area of her pain, it was tender.  She has not had palpitations, lightheadedness, or syncope.  She has not had orthopnea or leg swelling.  She denies bleeding.    Past Medical History:   Diagnosis Date    Anxiety     Depression     Environmental allergies     Fall 2022    pt was in a stool and it  wasn't leveled    Headache, tension-type     HL (hearing loss)     Hyperlipidemia     Hypertension     Low back pain     Neuropathy     Neuropathy in diabetes     OA (osteoarthritis)     Paroxysmal atrial fibrillation 08/06/2016    Rotator cuff tear     Type 2 diabetes mellitus        Past Surgical History:   Procedure Laterality Date    BACK SURGERY      X2    BLADDER SUSPENSION      CHOLECYSTECTOMY      HYSTERECTOMY  1970    KNEE ARTHROPLASTY Right     SHOULDER ARTHROSCOPY W/ ROTATOR CUFF REPAIR Left 11/12/2019    Procedure: Arthroscopic rotator cuff repair with subacromial decompression, Arthroscopic distal clavicle excision, and arthroscopic labral debridement ;  Surgeon: Cresencio Christina MD;  [facility];  Service: Orthopedics    SHOULDER SURGERY Right        Social History     Socioeconomic History    Marital status:     Number of children: 2   Tobacco Use    Smoking status: Never     Passive exposure: Never    Smokeless tobacco: Never   Vaping Use    Vaping status: Never Used   Substance and Sexual Activity    Alcohol use: Yes     Comment: occasionally socially    Drug use: Never    Sexual activity: Defer       Family History   Problem Relation Age of Onset    Thyroid disease Mother     Anxiety disorder Mother     Depression Mother     Bipolar disorder Mother     Heart failure Father     Hypertension Father     Kidney nephrosis Brother     Malig Hyperthermia Neg Hx        Review of Systems   All other systems reviewed and are negative.      Allergies   Allergen Reactions    Ciprofibrate Diarrhea and Unknown - Low Severity    Ciprofloxacin Diarrhea     Other reaction(s): GI Upset    Contrast Dye (Echo Or Unknown Ct/Mr) Itching    Iodinated Contrast Media Itching    Lyrica [Pregabalin] Dizziness    Gabapentin Other (See Comments)     Severe dry mouth.  Severe dry mouth.  Severe dry mouth.    Sulfa Antibiotics Itching         Current Outpatient Medications:     benazepril (LOTENSIN) 10 MG  "tablet, TAKE 1 TABLET EVERY DAY, Disp: 90 tablet, Rfl: 3    Calcium Carb-Cholecalciferol (CALCIUM 1000 + D PO), Take 2 tablets by mouth Daily., Disp: , Rfl:     citalopram (CeleXA) 20 MG tablet, TAKE 1 TABLET EVERY DAY, Disp: 90 tablet, Rfl: 3    glucosamine-chondroitin 500-400 MG capsule capsule, Take  by mouth 3 (Three) Times a Day With Meals., Disp: , Rfl:     melatonin 1 MG tablet, Take 1 tablet by mouth., Disp: , Rfl:     metFORMIN (GLUCOPHAGE) 500 MG tablet, TAKE 1 TABLET EVERY DAY, Disp: 90 tablet, Rfl: 3    Multiple Vitamin (MULTI VITAMIN DAILY PO), Take 1 tablet by mouth Daily., Disp: , Rfl:     mupirocin (BACTROBAN) 2 % ointment, Apply 1 application  topically to the appropriate area as directed 3 (Three) Times a Day., Disp: 15 g, Rfl: 0    nystatin-triamcinolone (MYCOLOG II) 965015-3.1 UNIT/GM-% cream, APPLY TOPICALLY TO THE AFFECTED AREA TWICE DAILY X 14 DAYS, Disp: , Rfl:     pravastatin (PRAVACHOL) 40 MG tablet, TAKE 1 TABLET EVERY DAY, Disp: 90 tablet, Rfl: 3    traMADol ER (ULTRAM-ER) 200 MG 24 hr tablet, Daily., Disp: , Rfl:     TURMERIC PO, Take 1 tablet by mouth Daily., Disp: , Rfl:     warfarin (Coumadin) 2.5 MG tablet, Take 1 tablet by mouth 2 (Two) Times a Week., Disp: 24 tablet, Rfl: 3    warfarin (COUMADIN) 5 MG tablet, TAKE 1 TAB (5MG) 5 TIMES WEEKLY., Disp: 60 tablet, Rfl: 3    Movantik 25 MG tablet, , Disp: , Rfl:   No current facility-administered medications for this visit.      Objective:     Vitals:    04/05/24 1347   BP: 112/76   Pulse: 79   Weight: 64 kg (141 lb)   Height: 157.5 cm (62.01\")     Body mass index is 25.78 kg/m².    Vitals reviewed.   Constitutional:       Appearance: Well-developed and not in distress.   Eyes:      Conjunctiva/sclera: Conjunctivae normal.   HENT:      Head: Normocephalic.      Nose: Nose normal.   Neck:      Thyroid: Thyroid normal.      Vascular: No JVD. JVD normal.      Lymphadenopathy: No cervical adenopathy.   Pulmonary:      Effort: Pulmonary " effort is normal.      Breath sounds: Normal breath sounds.   Cardiovascular:      Normal rate. Irregularly irregular rhythm.      Murmurs: There is no murmur.   Pulses:     Intact distal pulses.   Edema:     Peripheral edema absent.   Abdominal:      Palpations: Abdomen is soft.      Tenderness: There is no abdominal tenderness.   Musculoskeletal: Normal range of motion.      Cervical back: Normal range of motion. Skin:     General: Skin is warm and dry.   Neurological:      General: No focal deficit present.      Mental Status: Alert and oriented to person, place, and time.      Cranial Nerves: No cranial nerve deficit.   Psychiatric:         Behavior: Behavior normal.         Thought Content: Thought content normal.         Judgment: Judgment normal.           ECG 12 Lead    Date/Time: 4/5/2024 2:08 PM  Performed by: Teja Sim MD    Authorized by: Teja Sim MD  Comparison: compared with previous ECG   Similar to previous ECG  Rhythm: atrial fibrillation  Conduction: conduction normal  ST Segments: ST segments normal  T Waves: T waves normal  QRS axis: normal  Other: no other findings    Clinical impression: abnormal EKG            Assessment:       Diagnosis Plan   1. Longstanding persistent atrial fibrillation  ECG 12 Lead      2. Cervical spine arthritis        3. Hypertension, essential        4. Chest pain, atypical           Plan:       1/2.  She is asymptomatic and rate controlled.  Her [id number] score is 4.  She is appropriately anticoagulated with warfarin which is managed by her primary care physician.  Again, we do not do the day-to-day management of her protimes but I did reach out to our anticoagulation pharmacist who recommends holding warfarin for 5 days prior to epidural spinal injections and then having her take 1.5 tablets of warfarin on the 2 days following the procedure.  She should probably have her INR checked a week after it is done.  I offered her the ability to have her pro  time managed in our anticoagulation clinic but she prefers to have it managed at Dr. Hdz's office as it is right around the corner from her home.  We will reach out to Dr. Hdz's office and to [facility].    3.  Her blood pressure is well-controlled.    4.  She has no anginal complaints.  She has short-lived, sharp, reproducible chest pain at times that does not require further cardiac workup.    Sincerely,       Teja Sim MD

## 2024-04-09 ENCOUNTER — ANTICOAGULATION VISIT (OUTPATIENT)
Dept: FAMILY MEDICINE CLINIC | Facility: CLINIC | Age: 79
End: 2024-04-09
Payer: MEDICARE

## 2024-04-09 DIAGNOSIS — I48.0 PAROXYSMAL ATRIAL FIBRILLATION: Primary | ICD-10-CM

## 2024-04-09 LAB — INR PPP: 3 (ref 0.9–1.1)

## 2024-04-09 PROCEDURE — 36416 COLLJ CAPILLARY BLOOD SPEC: CPT | Performed by: STUDENT IN AN ORGANIZED HEALTH CARE EDUCATION/TRAINING PROGRAM

## 2024-04-09 PROCEDURE — 85610 PROTHROMBIN TIME: CPT | Performed by: STUDENT IN AN ORGANIZED HEALTH CARE EDUCATION/TRAINING PROGRAM

## 2024-04-16 ENCOUNTER — TELEPHONE (OUTPATIENT)
Dept: CARDIOLOGY | Facility: CLINIC | Age: 79
End: 2024-04-16
Payer: MEDICARE

## 2024-04-16 NOTE — TELEPHONE ENCOUNTER
Patient called. She is needing us to fax over  office note to [facility]. [phone]. I will fax office note.

## 2024-05-07 ENCOUNTER — ANTICOAGULATION VISIT (OUTPATIENT)
Dept: FAMILY MEDICINE CLINIC | Facility: CLINIC | Age: 79
End: 2024-05-07
Payer: MEDICARE

## 2024-05-07 DIAGNOSIS — I48.0 PAROXYSMAL ATRIAL FIBRILLATION: Primary | ICD-10-CM

## 2024-05-07 LAB — INR PPP: 3.2 (ref 0.9–1.1)

## 2024-05-07 PROCEDURE — 36416 COLLJ CAPILLARY BLOOD SPEC: CPT | Performed by: STUDENT IN AN ORGANIZED HEALTH CARE EDUCATION/TRAINING PROGRAM

## 2024-05-07 PROCEDURE — 85610 PROTHROMBIN TIME: CPT | Performed by: STUDENT IN AN ORGANIZED HEALTH CARE EDUCATION/TRAINING PROGRAM

## 2024-05-13 ENCOUNTER — OFFICE VISIT (OUTPATIENT)
Dept: FAMILY MEDICINE CLINIC | Facility: CLINIC | Age: 79
End: 2024-05-13
Payer: MEDICARE

## 2024-05-13 VITALS
TEMPERATURE: 97.1 F | WEIGHT: 140 LBS | BODY MASS INDEX: 25.76 KG/M2 | SYSTOLIC BLOOD PRESSURE: 106 MMHG | HEIGHT: 62 IN | OXYGEN SATURATION: 95 % | HEART RATE: 84 BPM | DIASTOLIC BLOOD PRESSURE: 72 MMHG

## 2024-05-13 DIAGNOSIS — M72.2 PLANTAR FASCIITIS OF LEFT FOOT: Primary | ICD-10-CM

## 2024-05-13 PROCEDURE — 3074F SYST BP LT 130 MM HG: CPT | Performed by: STUDENT IN AN ORGANIZED HEALTH CARE EDUCATION/TRAINING PROGRAM

## 2024-05-13 PROCEDURE — 3078F DIAST BP <80 MM HG: CPT | Performed by: STUDENT IN AN ORGANIZED HEALTH CARE EDUCATION/TRAINING PROGRAM

## 2024-05-13 PROCEDURE — 1159F MED LIST DOCD IN RCRD: CPT | Performed by: STUDENT IN AN ORGANIZED HEALTH CARE EDUCATION/TRAINING PROGRAM

## 2024-05-13 PROCEDURE — 1125F AMNT PAIN NOTED PAIN PRSNT: CPT | Performed by: STUDENT IN AN ORGANIZED HEALTH CARE EDUCATION/TRAINING PROGRAM

## 2024-05-13 PROCEDURE — 99214 OFFICE O/P EST MOD 30 MIN: CPT | Performed by: STUDENT IN AN ORGANIZED HEALTH CARE EDUCATION/TRAINING PROGRAM

## 2024-05-13 PROCEDURE — 1160F RVW MEDS BY RX/DR IN RCRD: CPT | Performed by: STUDENT IN AN ORGANIZED HEALTH CARE EDUCATION/TRAINING PROGRAM

## 2024-05-13 RX ORDER — NAPROXEN 500 MG/1
500 TABLET ORAL 2 TIMES DAILY WITH MEALS
Qty: 20 TABLET | Refills: 0 | Status: SHIPPED | OUTPATIENT
Start: 2024-05-13

## 2024-05-13 NOTE — PROGRESS NOTES
"Chief Complaint  Foot Pain (Pt says she hurt her Left foot in January (says she never went to the dr)/-she had pain last night /-pain when she walks a lot or works in the yard)    Subjective        Samira Workman presents to [facility]  History of Present Illness  79-year-old female with history of paroxysmal A. fib in 2016 (no known recurrence since that time, does not require rate or rhythm control) for which she takes warfarin, peripheral neuropathy, hypertension, hyperlipidemia who presents for left foot pain.    Injured her foot in January when she was walking her friend's dog.  The dog pulled her back and she rolled her foot outward.  Pain is worse at the end of the day.  It is located at her arch on the plantar aspect of her foot.  She is taking Tylenol for this.  Icing can sometimes help.  Supportive shoes.      Objective   Vital Signs:  /72 (BP Location: Right arm, Patient Position: Sitting, Cuff Size: Adult)   Pulse 84   Temp 97.1 °F (36.2 °C) (Infrared)   Ht 157.5 cm (62.01\")   Wt 63.5 kg (140 lb)   SpO2 95%   BMI 25.60 kg/m²   Estimated body mass index is 25.6 kg/m² as calculated from the following:    Height as of this encounter: 157.5 cm (62.01\").    Weight as of this encounter: 63.5 kg (140 lb).               Physical Exam  Constitutional:       General: She is not in acute distress.  HENT:      Nose: Rhinorrhea present.      Mouth/Throat:      Pharynx: No posterior oropharyngeal erythema.   Eyes:      Conjunctiva/sclera: Conjunctivae normal.   Cardiovascular:      Rate and Rhythm: Normal rate and regular rhythm.   Pulmonary:      Effort: Pulmonary effort is normal. No respiratory distress.      Breath sounds: Normal breath sounds.   Musculoskeletal:      Comments: Tenderness over plantar fascia of the left foot.  No bruising or swelling.   Skin:     General: Skin is warm and dry.   Neurological:      Mental Status: She is alert and oriented to person, place, and " time.   Psychiatric:         Mood and Affect: Mood normal.         Behavior: Behavior normal.        Result Review :    The following data was reviewed by: Adalgisa Hdz MD on 05/13/2024:  Common labs          1/23/2024    13:59   Common Labs   Glucose 90    BUN 18    Creatinine 0.64    Sodium 140    Potassium 4.7    Chloride 98    Calcium 9.3    Total Protein 7.0    Albumin 4.5    Total Bilirubin 0.3    Alkaline Phosphatase 93    AST (SGOT) 22    ALT (SGPT) 19    WBC 8.27    Hemoglobin 14.7    Hematocrit 44.3    Platelets 297    Total Cholesterol 153    Triglycerides 70    HDL Cholesterol 65    LDL Cholesterol  74    Hemoglobin A1C 6.30    Microalbumin, Urine 6.2      Data reviewed : none             Assessment and Plan     Diagnoses and all orders for this visit:    1. Plantar fasciitis of left foot (Primary)  -     naproxen (Naprosyn) 500 MG tablet; Take 1 tablet by mouth 2 (Two) Times a Day With Meals.  Dispense: 20 tablet; Refill: 0    Pain is consistent with plantar fasciitis.  We discussed some conservative measures such as stretches, icing, supportive shoes.  I discussed that a very short course of NSAIDs may be helpful but considering she is also on warfarin will need to monitor very closely for any type of bleeding.  She should not continue this medication beyond 5 days.  She can use Tylenol in addition for pain relief.    If no improvement in 2 weeks with these conservative measures, will obtain x-ray to evaluate for occult fracture.         Follow Up     No follow-ups on file.  Patient was given instructions and counseling regarding her condition or for health maintenance advice. Please see specific information pulled into the AVS if appropriate.

## 2024-05-21 ENCOUNTER — ANTICOAGULATION VISIT (OUTPATIENT)
Dept: FAMILY MEDICINE CLINIC | Facility: CLINIC | Age: 79
End: 2024-05-21
Payer: MEDICARE

## 2024-05-21 DIAGNOSIS — M72.2 PLANTAR FASCIITIS OF LEFT FOOT: ICD-10-CM

## 2024-05-21 DIAGNOSIS — I48.0 PAROXYSMAL ATRIAL FIBRILLATION: Primary | ICD-10-CM

## 2024-05-21 LAB — INR PPP: 3.2 (ref 0.9–1.1)

## 2024-05-21 PROCEDURE — 85610 PROTHROMBIN TIME: CPT | Performed by: STUDENT IN AN ORGANIZED HEALTH CARE EDUCATION/TRAINING PROGRAM

## 2024-05-21 PROCEDURE — 36416 COLLJ CAPILLARY BLOOD SPEC: CPT | Performed by: STUDENT IN AN ORGANIZED HEALTH CARE EDUCATION/TRAINING PROGRAM

## 2024-05-21 RX ORDER — NAPROXEN 500 MG/1
500 TABLET ORAL 2 TIMES DAILY WITH MEALS
Qty: 20 TABLET | Refills: 0 | Status: SHIPPED | OUTPATIENT
Start: 2024-05-21

## 2024-05-21 NOTE — TELEPHONE ENCOUNTER
Rx Refill Note  Requested Prescriptions     Pending Prescriptions Disp Refills    naproxen (NAPROSYN) 500 MG tablet [Pharmacy Med Name: NAPROXEN 500MG TABLETS] 20 tablet 0     Sig: TAKE 1 TABLET BY MOUTH TWICE DAILY WITH MEALS      Last office visit with prescribing clinician: 5/13/2024   Last telemedicine visit with prescribing clinician: Visit date not found   Next office visit with prescribing clinician: Visit date not found                         Would you like a call back once the refill request has been completed: [] Yes [] No    If the office needs to give you a call back, can they leave a voicemail: [] Yes [] No    Mono Rodriguez CMA/CONCETTA  05/21/24, 07:31 EDT

## 2024-06-18 ENCOUNTER — ANTICOAGULATION VISIT (OUTPATIENT)
Dept: FAMILY MEDICINE CLINIC | Facility: CLINIC | Age: 79
End: 2024-06-18
Payer: MEDICARE

## 2024-06-18 DIAGNOSIS — Z79.01 LONG TERM CURRENT USE OF ANTICOAGULANT THERAPY: Primary | ICD-10-CM

## 2024-06-18 LAB — INR PPP: 2.8 (ref 0.9–1.1)

## 2024-06-18 PROCEDURE — 85610 PROTHROMBIN TIME: CPT | Performed by: STUDENT IN AN ORGANIZED HEALTH CARE EDUCATION/TRAINING PROGRAM

## 2024-06-18 PROCEDURE — 36416 COLLJ CAPILLARY BLOOD SPEC: CPT | Performed by: STUDENT IN AN ORGANIZED HEALTH CARE EDUCATION/TRAINING PROGRAM

## 2024-07-18 ENCOUNTER — ANTICOAGULATION VISIT (OUTPATIENT)
Dept: FAMILY MEDICINE CLINIC | Facility: CLINIC | Age: 79
End: 2024-07-18
Payer: MEDICARE

## 2024-07-18 DIAGNOSIS — I48.0 PAROXYSMAL ATRIAL FIBRILLATION: ICD-10-CM

## 2024-07-18 DIAGNOSIS — Z79.01 LONG TERM CURRENT USE OF ANTICOAGULANT THERAPY: Primary | ICD-10-CM

## 2024-07-18 LAB — INR PPP: 2.7 (ref 0.9–1.1)

## 2024-07-18 PROCEDURE — 85610 PROTHROMBIN TIME: CPT | Performed by: FAMILY MEDICINE

## 2024-07-18 PROCEDURE — 36416 COLLJ CAPILLARY BLOOD SPEC: CPT | Performed by: FAMILY MEDICINE

## 2024-07-22 DIAGNOSIS — I48.0 PAROXYSMAL ATRIAL FIBRILLATION: ICD-10-CM

## 2024-07-22 RX ORDER — WARFARIN SODIUM 5 MG/1
TABLET ORAL
Qty: 60 TABLET | Refills: 3 | Status: SHIPPED | OUTPATIENT
Start: 2024-07-22

## 2024-08-27 ENCOUNTER — ANTICOAGULATION VISIT (OUTPATIENT)
Dept: FAMILY MEDICINE CLINIC | Facility: CLINIC | Age: 79
End: 2024-08-27
Payer: MEDICARE

## 2024-08-27 DIAGNOSIS — Z79.01 LONG TERM CURRENT USE OF ANTICOAGULANT THERAPY: ICD-10-CM

## 2024-08-27 DIAGNOSIS — I48.0 PAROXYSMAL ATRIAL FIBRILLATION: Primary | ICD-10-CM

## 2024-08-27 LAB — INR PPP: 4 (ref 0.9–1.1)

## 2024-08-27 PROCEDURE — 85610 PROTHROMBIN TIME: CPT | Performed by: STUDENT IN AN ORGANIZED HEALTH CARE EDUCATION/TRAINING PROGRAM

## 2024-08-27 PROCEDURE — 36416 COLLJ CAPILLARY BLOOD SPEC: CPT | Performed by: STUDENT IN AN ORGANIZED HEALTH CARE EDUCATION/TRAINING PROGRAM

## 2024-09-04 DIAGNOSIS — I48.0 PAROXYSMAL ATRIAL FIBRILLATION: ICD-10-CM

## 2024-09-04 RX ORDER — WARFARIN SODIUM 2.5 MG/1
2.5 TABLET ORAL 2 TIMES WEEKLY
Qty: 24 TABLET | Refills: 3 | Status: SHIPPED | OUTPATIENT
Start: 2024-09-05

## 2024-09-04 NOTE — TELEPHONE ENCOUNTER
Rx Refill Note  Requested Prescriptions     Pending Prescriptions Disp Refills    warfarin (COUMADIN) 2.5 MG tablet [Pharmacy Med Name: Warfarin Sodium Oral Tablet 2.5 MG] 24 tablet 3     Sig: TAKE 1 TABLET BY MOUTH 2 (TWO) TIMES A WEEK.      Last office visit with prescribing clinician: 5/13/2024   Last telemedicine visit with prescribing clinician: Visit date not found   Next office visit with prescribing clinician: Visit date not found                         Would you like a call back once the refill request has been completed: [] Yes [] No    If the office needs to give you a call back, can they leave a voicemail: [] Yes [] No    Bella Silva MA  09/04/24, 07:27 EDT

## 2024-09-10 ENCOUNTER — ANTICOAGULATION VISIT (OUTPATIENT)
Dept: FAMILY MEDICINE CLINIC | Facility: CLINIC | Age: 79
End: 2024-09-10
Payer: MEDICARE

## 2024-09-10 DIAGNOSIS — I48.0 PAROXYSMAL ATRIAL FIBRILLATION: ICD-10-CM

## 2024-09-10 DIAGNOSIS — Z79.01 LONG TERM CURRENT USE OF ANTICOAGULANT THERAPY: Primary | ICD-10-CM

## 2024-09-10 LAB — INR PPP: 4.5 (ref 0.9–1.1)

## 2024-09-10 PROCEDURE — 36416 COLLJ CAPILLARY BLOOD SPEC: CPT | Performed by: STUDENT IN AN ORGANIZED HEALTH CARE EDUCATION/TRAINING PROGRAM

## 2024-09-10 PROCEDURE — 99211 OFF/OP EST MAY X REQ PHY/QHP: CPT | Performed by: STUDENT IN AN ORGANIZED HEALTH CARE EDUCATION/TRAINING PROGRAM

## 2024-09-10 PROCEDURE — 85610 PROTHROMBIN TIME: CPT | Performed by: STUDENT IN AN ORGANIZED HEALTH CARE EDUCATION/TRAINING PROGRAM

## 2024-09-25 ENCOUNTER — OFFICE VISIT (OUTPATIENT)
Dept: FAMILY MEDICINE CLINIC | Facility: CLINIC | Age: 79
End: 2024-09-25
Payer: MEDICARE

## 2024-09-25 VITALS
SYSTOLIC BLOOD PRESSURE: 138 MMHG | DIASTOLIC BLOOD PRESSURE: 80 MMHG | OXYGEN SATURATION: 96 % | WEIGHT: 140 LBS | HEART RATE: 74 BPM | HEIGHT: 62 IN | BODY MASS INDEX: 25.76 KG/M2 | TEMPERATURE: 97.1 F

## 2024-09-25 DIAGNOSIS — I48.0 PAROXYSMAL ATRIAL FIBRILLATION: ICD-10-CM

## 2024-09-25 DIAGNOSIS — Z79.01 LONG TERM CURRENT USE OF ANTICOAGULANT THERAPY: ICD-10-CM

## 2024-09-25 DIAGNOSIS — M51.27 HERNIATED NUCLEUS PULPOSUS, L5-S1: ICD-10-CM

## 2024-09-25 DIAGNOSIS — E11.8 CONTROLLED TYPE 2 DIABETES MELLITUS WITH COMPLICATION, WITHOUT LONG-TERM CURRENT USE OF INSULIN: Primary | ICD-10-CM

## 2024-09-25 LAB
EXPIRATION DATE: ABNORMAL
HBA1C MFR BLD: 6.1 % (ref 4.5–5.7)
INR PPP: 2.9 (ref 0.9–1.1)
Lab: ABNORMAL

## 2024-09-25 PROCEDURE — 36416 COLLJ CAPILLARY BLOOD SPEC: CPT | Performed by: STUDENT IN AN ORGANIZED HEALTH CARE EDUCATION/TRAINING PROGRAM

## 2024-09-25 PROCEDURE — 1159F MED LIST DOCD IN RCRD: CPT | Performed by: STUDENT IN AN ORGANIZED HEALTH CARE EDUCATION/TRAINING PROGRAM

## 2024-09-25 PROCEDURE — 3075F SYST BP GE 130 - 139MM HG: CPT | Performed by: STUDENT IN AN ORGANIZED HEALTH CARE EDUCATION/TRAINING PROGRAM

## 2024-09-25 PROCEDURE — 85610 PROTHROMBIN TIME: CPT | Performed by: STUDENT IN AN ORGANIZED HEALTH CARE EDUCATION/TRAINING PROGRAM

## 2024-09-25 PROCEDURE — 1125F AMNT PAIN NOTED PAIN PRSNT: CPT | Performed by: STUDENT IN AN ORGANIZED HEALTH CARE EDUCATION/TRAINING PROGRAM

## 2024-09-25 PROCEDURE — 83036 HEMOGLOBIN GLYCOSYLATED A1C: CPT | Performed by: STUDENT IN AN ORGANIZED HEALTH CARE EDUCATION/TRAINING PROGRAM

## 2024-09-25 PROCEDURE — 3079F DIAST BP 80-89 MM HG: CPT | Performed by: STUDENT IN AN ORGANIZED HEALTH CARE EDUCATION/TRAINING PROGRAM

## 2024-09-25 PROCEDURE — 99214 OFFICE O/P EST MOD 30 MIN: CPT | Performed by: STUDENT IN AN ORGANIZED HEALTH CARE EDUCATION/TRAINING PROGRAM

## 2024-09-25 PROCEDURE — 1160F RVW MEDS BY RX/DR IN RCRD: CPT | Performed by: STUDENT IN AN ORGANIZED HEALTH CARE EDUCATION/TRAINING PROGRAM

## 2024-09-25 PROCEDURE — 3044F HG A1C LEVEL LT 7.0%: CPT | Performed by: STUDENT IN AN ORGANIZED HEALTH CARE EDUCATION/TRAINING PROGRAM

## 2024-09-25 RX ORDER — TRIAMCINOLONE ACETONIDE 1 MG/G
1 CREAM TOPICAL
COMMUNITY
Start: 2024-09-12 | End: 2024-10-07

## 2024-09-25 RX ORDER — METHOCARBAMOL 750 MG/1
750 TABLET, FILM COATED ORAL 4 TIMES DAILY PRN
Qty: 30 TABLET | Refills: 2 | Status: SHIPPED | OUTPATIENT
Start: 2024-09-25

## 2024-09-25 RX ORDER — CIDER VINEGAR 300 MG
TABLET ORAL
COMMUNITY

## 2024-09-25 RX ORDER — LANOLIN ALCOHOL/MO/W.PET/CERES
1000 CREAM (GRAM) TOPICAL DAILY
COMMUNITY

## 2024-09-25 RX ORDER — BIOTIN 1000 MCG
TABLET,CHEWABLE ORAL
COMMUNITY

## 2024-09-25 RX ORDER — CHOLECALCIFEROL (VITAMIN D3) 25 MCG
1000 TABLET ORAL DAILY
COMMUNITY

## 2024-09-30 RX ORDER — PRAVASTATIN SODIUM 40 MG
TABLET ORAL
Qty: 90 TABLET | Refills: 3 | Status: SHIPPED | OUTPATIENT
Start: 2024-09-30

## 2024-09-30 RX ORDER — BENAZEPRIL HYDROCHLORIDE 10 MG/1
TABLET ORAL
Qty: 90 TABLET | Refills: 3 | Status: SHIPPED | OUTPATIENT
Start: 2024-09-30

## 2024-10-02 NOTE — PROGRESS NOTES
"Chief Complaint  Med Management (Pt would like to discuss metformin and blood sugar levels.)    Subjective        Samira Workman presents to [facility] PR[facility]  History of Present Illness  79-year-old female with history of paroxysmal A. fib in 2016 (no known recurrence since that time, does not require rate or rhythm control) for which she takes warfarin, peripheral neuropathy, hypertension, hyperlipidemia who presents for elevated blood sugar.    Controlled on metformin. Asymptomatic.  Occasional back spasms related to herniated disc.    Recently supratherapeutic INR. No bleeding. Dose adjustment of warfarin and due for 2week recheck.      Objective   Vital Signs:  /80   Pulse 74   Temp 97.1 °F (36.2 °C)   Ht 157.5 cm (62.01\")   Wt 63.5 kg (140 lb)   SpO2 96%   BMI 25.60 kg/m²   Estimated body mass index is 25.6 kg/m² as calculated from the following:    Height as of this encounter: 157.5 cm (62.01\").    Weight as of this encounter: 63.5 kg (140 lb).            Physical Exam  Constitutional:       General: She is not in acute distress.     Appearance: Normal appearance. She is not ill-appearing, toxic-appearing or diaphoretic.   HENT:      Head: Normocephalic and atraumatic.   Eyes:      General: No scleral icterus.        Right eye: No discharge.         Left eye: No discharge.      Extraocular Movements: Extraocular movements intact.   Cardiovascular:      Rate and Rhythm: Normal rate.      Heart sounds: Normal heart sounds.   Pulmonary:      Effort: Pulmonary effort is normal. No respiratory distress.   Musculoskeletal:      Right lower leg: No edema.      Left lower leg: No edema.   Neurological:      General: No focal deficit present.      Mental Status: She is alert and oriented to person, place, and time.      Motor: No weakness.      Gait: Gait normal.   Psychiatric:         Mood and Affect: Mood normal.         Behavior: Behavior normal.        Result Review :  The following " data was reviewed by: Adalgisa Hdz MD on 09/25/2024:  Common labs          1/23/2024    13:59 9/25/2024    13:45   Common Labs   Glucose 90     BUN 18     Creatinine 0.64     Sodium 140     Potassium 4.7     Chloride 98     Calcium 9.3     Total Protein 7.0     Albumin 4.5     Total Bilirubin 0.3     Alkaline Phosphatase 93     AST (SGOT) 22     ALT (SGPT) 19     WBC 8.27     Hemoglobin 14.7     Hematocrit 44.3     Platelets 297     Total Cholesterol 153     Triglycerides 70     HDL Cholesterol 65     LDL Cholesterol  74     Hemoglobin A1C 6.30  6.1    Microalbumin, Urine 6.2       Data reviewed : none           Assessment and Plan   Diagnoses and all orders for this visit:    1. Controlled type 2 diabetes mellitus with complication, without long-term current use of insulin (Primary)  -     POC INR  -     POC Glycosylated Hemoglobin (Hb A1C)    2. Long term current use of anticoagulant therapy  -     POC INR    3. Paroxysmal atrial fibrillation  -     POC INR    Other orders  -     methocarbamol (ROBAXIN) 750 MG tablet; Take 1 tablet by mouth 4 (Four) Times a Day As Needed for Muscle Spasms.  Dispense: 30 tablet; Refill: 2             Follow Up   Return in about 4 months (around 1/25/2025) for Medicare Wellness.  Patient was given instructions and counseling regarding her condition or for health maintenance advice. Please see specific information pulled into the AVS if appropriate.

## 2024-10-02 NOTE — ASSESSMENT & PLAN NOTE
Patient in sinus rhythm today.  She had an episode of A. fib in 2016 for which she was started on warfarin.  Not currently on beta-blocker.  She has not had symptoms since 2016.   LOW5MC1-AMNp of 4, indicating anticoagulation preferred to reduce stroke risk.  Previously offered DOAC but preferred to stay on Xarelto due to cost.  No history of major bleeding or bruising.  INR today 2.9.  Goal 2-3. Continue. Recheck in 2weeks.

## 2024-10-02 NOTE — ASSESSMENT & PLAN NOTE
A1c 6.1% today. Goal <7%  Well controlled on current regimen -metformin 500mg daily.  Continue current regimen.   On ACEi and statin.  Recommend yearly eye exams and daily foot exams.

## 2024-10-07 ENCOUNTER — OFFICE VISIT (OUTPATIENT)
Dept: CARDIOLOGY | Facility: CLINIC | Age: 79
End: 2024-10-07
Payer: MEDICARE

## 2024-10-07 VITALS
BODY MASS INDEX: 26.83 KG/M2 | OXYGEN SATURATION: 97 % | HEART RATE: 85 BPM | DIASTOLIC BLOOD PRESSURE: 78 MMHG | WEIGHT: 145.8 LBS | SYSTOLIC BLOOD PRESSURE: 122 MMHG | HEIGHT: 62 IN

## 2024-10-07 DIAGNOSIS — I48.21 PERMANENT ATRIAL FIBRILLATION: Primary | ICD-10-CM

## 2024-10-07 DIAGNOSIS — I49.3 PVCS (PREMATURE VENTRICULAR CONTRACTIONS): ICD-10-CM

## 2024-10-07 DIAGNOSIS — I10 PRIMARY HYPERTENSION: ICD-10-CM

## 2024-10-07 PROCEDURE — 99214 OFFICE O/P EST MOD 30 MIN: CPT | Performed by: NURSE PRACTITIONER

## 2024-10-07 PROCEDURE — 93000 ELECTROCARDIOGRAM COMPLETE: CPT | Performed by: NURSE PRACTITIONER

## 2024-10-07 PROCEDURE — 1160F RVW MEDS BY RX/DR IN RCRD: CPT | Performed by: NURSE PRACTITIONER

## 2024-10-07 PROCEDURE — 1159F MED LIST DOCD IN RCRD: CPT | Performed by: NURSE PRACTITIONER

## 2024-10-07 PROCEDURE — 3074F SYST BP LT 130 MM HG: CPT | Performed by: NURSE PRACTITIONER

## 2024-10-07 PROCEDURE — 3078F DIAST BP <80 MM HG: CPT | Performed by: NURSE PRACTITIONER

## 2024-10-07 NOTE — PROGRESS NOTES
Date of Office Visit: 10/07/2024  Encounter Provider: SALINAS Edwards  Place of Service: [facility] CARDIOLOGY  Patient Name: Samira Workman  :1945  Primary Cardiologist: Dr. Teja Sim     Chief Complaint   Patient presents with    Atrial Fibrillation    Follow-up   :     HPI: Samira Workman is a 79 y.o. female who presents today for follow up on atrial fibrillation. She is a new patient to me and I have reviewed her medical records.      She has been diagnosed with hypertension, hyperlipidemia, type 2 diabetes mellitus, neuropathy, and severe multilevel cervical spine disease.      She has been diagnosed with atrial fibrillation.  She has not been on a rate controlling agent and has remained on warfarin followed by her PCP.  She tried to take apixaban, but it was cost prohibitive.  In , she had a normal stress test.    In 2024, she established care with Dr. Teja Sim.    She presents today for follow-up visit.  She denies chest pain, shortness of air, PND, with apnea, palpitations, edema, dizziness, syncope, or bleeding.  She suffers from a bad neck and experiences bilateral hand numbness.  She is currently working with physical therapy.  This past [place], [place] and felt hot and short of breath going up hills.  At 1 point she became overheated and felt faint.  She felt better after getting into air conditioning.  Blood pressure and heart rate are normal today.  She remains in atrial fibrillation.      Past Medical History:   Diagnosis Date    Anxiety     Depression     Environmental allergies     Fall 2022    pt was in a stool and it wasn't leveled    Headache, tension-type     HL (hearing loss)     Hyperlipidemia     Hypertension     Low back pain     Neuropathy     Neuropathy in diabetes     OA (osteoarthritis)     Paroxysmal atrial fibrillation 2016    PVCs (premature ventricular contractions)     Rotator cuff tear     Type 2 diabetes  mellitus        Past Surgical History:   Procedure Laterality Date    BACK SURGERY      X2    BLADDER SUSPENSION      CHOLECYSTECTOMY      HYSTERECTOMY  1970    KNEE ARTHROPLASTY Right     SHOULDER ARTHROSCOPY W/ ROTATOR CUFF REPAIR Left 11/12/2019    Procedure: Arthroscopic rotator cuff repair with subacromial decompression, Arthroscopic distal clavicle excision, and arthroscopic labral debridement ;  Surgeon: Cresencio Christina MD;  Location: [facility] OR [facility];  Service: Orthopedics    SHOULDER SURGERY Right        Social History     Socioeconomic History    Marital status:     Number of children: 2   Tobacco Use    Smoking status: Never     Passive exposure: Never    Smokeless tobacco: Never   Vaping Use    Vaping status: Never Used   Substance and Sexual Activity    Alcohol use: Yes     Comment: occasionally socially    Drug use: Never    Sexual activity: Defer       Family History   Problem Relation Age of Onset    Thyroid disease Mother     Anxiety disorder Mother     Depression Mother     Bipolar disorder Mother     Heart failure Father     Hypertension Father     Kidney nephrosis Brother     Malig Hyperthermia Neg Hx        The following portion of the patient's history were reviewed and updated as appropriate: past medical history, past surgical history, past social history, past family history, allergies, current medications, and problem list.    Review of Systems   Constitutional: Negative.   Cardiovascular: Negative.    Respiratory: Negative.     Neurological:  Positive for numbness.       Allergies   Allergen Reactions    Ciprofibrate Diarrhea and Unknown - Low Severity    Ciprofloxacin Diarrhea     Other reaction(s): GI Upset    Contrast Dye (Echo Or Unknown Ct/Mr) Itching    Iodinated Contrast Media Itching    Lyrica [Pregabalin] Dizziness    Gabapentin Other (See Comments)     Severe dry mouth.  Severe dry mouth.  Severe dry mouth.    Sulfa Antibiotics Itching         Current Outpatient  "Medications:     Apple Cider Vinegar 300 MG tablet, Take  by mouth., Disp: , Rfl:     benazepril (LOTENSIN) 10 MG tablet, TAKE 1 TABLET EVERY DAY, Disp: 90 tablet, Rfl: 3    Biotin 1000 MCG chewable tablet, Chew., Disp: , Rfl:     Cholecalciferol 25 MCG (1000 UT) tablet, Take 1 tablet by mouth Daily., Disp: , Rfl:     citalopram (CeleXA) 20 MG tablet, TAKE 1 TABLET EVERY DAY, Disp: 90 tablet, Rfl: 3    folic acid (FOLVITE) 400 MCG tablet, Take 2.5 tablets by mouth Daily., Disp: , Rfl:     glucosamine-chondroitin 500-400 MG capsule capsule, Take  by mouth Daily., Disp: , Rfl:     melatonin 1 MG tablet, Take 1 tablet by mouth., Disp: , Rfl:     methocarbamol (ROBAXIN) 750 MG tablet, Take 1 tablet by mouth 4 (Four) Times a Day As Needed for Muscle Spasms., Disp: 30 tablet, Rfl: 2    Multiple Vitamin (MULTI VITAMIN DAILY PO), Take 1 tablet by mouth Daily., Disp: , Rfl:     pravastatin (PRAVACHOL) 40 MG tablet, TAKE 1 TABLET EVERY DAY, Disp: 90 tablet, Rfl: 3    traMADol ER (ULTRAM-ER) 200 MG 24 hr tablet, Daily., Disp: , Rfl:     TURMERIC PO, Take 1 tablet by mouth Daily., Disp: , Rfl:     warfarin (COUMADIN) 2.5 MG tablet, TAKE 1 TABLET BY MOUTH 2 (TWO) TIMES A WEEK., Disp: 24 tablet, Rfl: 3    warfarin (COUMADIN) 5 MG tablet, TAKE 1 TAB (5MG) 5 TIMES WEEKLY., Disp: 60 tablet, Rfl: 3         Objective:     Vitals:    10/07/24 1316   BP: 122/78   BP Location: Right arm   Patient Position: Sitting   Cuff Size: Adult   Pulse: 85   SpO2: 97%   Weight: 66.1 kg (145 lb 12.8 oz)   Height: 157.5 cm (62.01\")     Body mass index is 26.66 kg/m².    PHYSICAL EXAM:    Vitals Reviewed.   General Appearance: No acute distress, well developed and well nourished.   HENT: No hearing loss noted.    Respiratory: No signs of respiratory distress. Respiration rhythm and depth normal.  Clear to auscultation.   Cardiovascular:  Jugular Venous Pressure: Normal  Heart Rate and Rhythm: Irregular, irregular.  Heart Sounds: Normal S1 and S2. No S3 " or S4 noted.  Murmurs: No murmurs noted. No rubs, thrills, or gallops.   Lower Extremities: No edema noted.  Musculoskeletal: Normal movement of extremities.  Skin: General appearance normal.    Psychiatric: Patient alert and oriented to person, place, and time. Speech and behavior appropriate. Normal mood and affect.       ECG 12 Lead    Date/Time: 10/7/2024 1:18 PM  Performed by: Lilly Matthews APRN    Authorized by: Lilly Matthews APRN  Comparison: compared with previous ECG from 4/5/2024  Similar to previous ECG  Rhythm: atrial fibrillation  Ectopy: unifocal PVCs  Rate: normal  BPM: 85  Conduction: conduction normal  ST Segments: ST segments normal  T Waves: T waves normal  QRS axis: normal    Clinical impression: abnormal EKG            Assessment:       Diagnosis Plan   1. Permanent atrial fibrillation        2. PVCs (premature ventricular contractions)        3. Primary hypertension               Plan:       1.  Permanent Atrial Fibrillation: Asymptomatic and naturally rate controlled. [id number] score of 5.  She remains on warfarin and denies bleeding.  INRs are followed by her PCP.    2.  Single PVC noted on today's EKG and she is asymptomatic.    3.  Hypertension: Blood pressure well-controlled.    4.  She will call with any concerns.  Follow-up with Dr. Sim in 1 year.      As always, it has been a pleasure to participate in your patient's care. Thank you.         Sincerely,         SALINAS Fernández  [facility] Cardiology      Dictated utilizing Dragon Dictation  I spent 37 minutes reviewing her medical records/testing/previous office notes/labs, face-to-face interaction with patient, physical examination, formulating the plan of care, and discussion of plan of care with patient.

## 2024-10-09 ENCOUNTER — ANTICOAGULATION VISIT (OUTPATIENT)
Dept: FAMILY MEDICINE CLINIC | Facility: CLINIC | Age: 79
End: 2024-10-09
Payer: MEDICARE

## 2024-10-09 DIAGNOSIS — Z79.01 LONG TERM CURRENT USE OF ANTICOAGULANT THERAPY: Primary | ICD-10-CM

## 2024-10-09 LAB — INR PPP: 2.9 (ref 0.9–1.1)

## 2024-10-09 PROCEDURE — 36416 COLLJ CAPILLARY BLOOD SPEC: CPT | Performed by: STUDENT IN AN ORGANIZED HEALTH CARE EDUCATION/TRAINING PROGRAM

## 2024-10-09 PROCEDURE — 85610 PROTHROMBIN TIME: CPT | Performed by: STUDENT IN AN ORGANIZED HEALTH CARE EDUCATION/TRAINING PROGRAM

## 2024-11-06 ENCOUNTER — ANTICOAGULATION VISIT (OUTPATIENT)
Dept: FAMILY MEDICINE CLINIC | Facility: CLINIC | Age: 79
End: 2024-11-06
Payer: MEDICARE

## 2024-11-06 ENCOUNTER — DOCUMENTATION (OUTPATIENT)
Dept: FAMILY MEDICINE CLINIC | Facility: CLINIC | Age: 79
End: 2024-11-06
Payer: MEDICARE

## 2024-11-06 ENCOUNTER — TREATMENT (OUTPATIENT)
Dept: FAMILY MEDICINE CLINIC | Facility: CLINIC | Age: 79
End: 2024-11-06

## 2024-11-06 DIAGNOSIS — Z79.01 LONG TERM CURRENT USE OF ANTICOAGULANT THERAPY: Primary | ICD-10-CM

## 2024-11-06 LAB — INR PPP: 3.1 (ref 0.9–1.1)

## 2024-11-06 PROCEDURE — 36416 COLLJ CAPILLARY BLOOD SPEC: CPT | Performed by: STUDENT IN AN ORGANIZED HEALTH CARE EDUCATION/TRAINING PROGRAM

## 2024-11-06 PROCEDURE — 85610 PROTHROMBIN TIME: CPT | Performed by: STUDENT IN AN ORGANIZED HEALTH CARE EDUCATION/TRAINING PROGRAM

## 2024-11-11 DIAGNOSIS — Z13.0 SCREENING FOR DEFICIENCY ANEMIA: Primary | ICD-10-CM

## 2024-11-11 DIAGNOSIS — E11.8 CONTROLLED TYPE 2 DIABETES MELLITUS WITH COMPLICATION, WITHOUT LONG-TERM CURRENT USE OF INSULIN: ICD-10-CM

## 2024-11-11 DIAGNOSIS — R73.9 ELEVATED RANDOM BLOOD GLUCOSE LEVEL: ICD-10-CM

## 2024-11-11 DIAGNOSIS — Z13.6 SCREENING FOR ISCHEMIC HEART DISEASE: ICD-10-CM

## 2024-11-13 ENCOUNTER — TELEPHONE (OUTPATIENT)
Dept: FAMILY MEDICINE CLINIC | Facility: CLINIC | Age: 79
End: 2024-11-13
Payer: MEDICARE

## 2024-11-13 NOTE — TELEPHONE ENCOUNTER
Patient received a letter in the mail from her insurance carrier asking her to ask her provider to order a urine test to check her kidney health. Patient is scheduled for urine ([facility]) on 11/20. Will clinical authorize urine test or not necessary?

## 2024-11-13 NOTE — TELEPHONE ENCOUNTER
Yes.  This is already ordered but looks like it has not been released.  The order is called a urine microalbumin.  And I would recommend this.

## 2024-11-20 ENCOUNTER — ANTICOAGULATION VISIT (OUTPATIENT)
Dept: FAMILY MEDICINE CLINIC | Facility: CLINIC | Age: 79
End: 2024-11-20
Payer: MEDICARE

## 2024-11-20 DIAGNOSIS — I48.21 PERMANENT ATRIAL FIBRILLATION: ICD-10-CM

## 2024-11-20 DIAGNOSIS — Z13.0 SCREENING FOR DEFICIENCY ANEMIA: Primary | ICD-10-CM

## 2024-11-20 DIAGNOSIS — Z13.89 SCREENING FOR HEMATURIA OR PROTEINURIA: ICD-10-CM

## 2024-11-20 DIAGNOSIS — Z13.6 SCREENING FOR ISCHEMIC HEART DISEASE: ICD-10-CM

## 2024-11-20 DIAGNOSIS — R73.9 ELEVATED RANDOM BLOOD GLUCOSE LEVEL: ICD-10-CM

## 2024-11-20 LAB — INR PPP: 2.9 (ref 0.9–1.1)

## 2024-11-20 PROCEDURE — 85610 PROTHROMBIN TIME: CPT | Performed by: STUDENT IN AN ORGANIZED HEALTH CARE EDUCATION/TRAINING PROGRAM

## 2024-11-20 PROCEDURE — 36416 COLLJ CAPILLARY BLOOD SPEC: CPT | Performed by: STUDENT IN AN ORGANIZED HEALTH CARE EDUCATION/TRAINING PROGRAM

## 2024-11-21 LAB
ALBUMIN SERPL-MCNC: 4.2 G/DL (ref 3.8–4.8)
ALP SERPL-CCNC: 96 IU/L (ref 44–121)
ALT SERPL-CCNC: 19 IU/L (ref 0–32)
AST SERPL-CCNC: 25 IU/L (ref 0–40)
BASOPHILS # BLD AUTO: 0.1 X10E3/UL (ref 0–0.2)
BASOPHILS NFR BLD AUTO: 1 %
BILIRUB SERPL-MCNC: 0.4 MG/DL (ref 0–1.2)
BUN SERPL-MCNC: 16 MG/DL (ref 8–27)
BUN/CREAT SERPL: 26 (ref 12–28)
CALCIUM SERPL-MCNC: 9.7 MG/DL (ref 8.7–10.3)
CHLORIDE SERPL-SCNC: 100 MMOL/L (ref 96–106)
CHOLEST SERPL-MCNC: 134 MG/DL (ref 100–199)
CO2 SERPL-SCNC: 26 MMOL/L (ref 20–29)
CREAT SERPL-MCNC: 0.62 MG/DL (ref 0.57–1)
CREAT UR-MCNC: 60.6 MG/DL
EGFRCR SERPLBLD CKD-EPI 2021: 91 ML/MIN/1.73
EOSINOPHIL # BLD AUTO: 0.3 X10E3/UL (ref 0–0.4)
EOSINOPHIL NFR BLD AUTO: 5 %
ERYTHROCYTE [DISTWIDTH] IN BLOOD BY AUTOMATED COUNT: 12.6 % (ref 11.7–15.4)
GLOBULIN SER CALC-MCNC: 2.6 G/DL (ref 1.5–4.5)
GLUCOSE SERPL-MCNC: 106 MG/DL (ref 70–99)
HBA1C MFR BLD: 6.5 % (ref 4.8–5.6)
HCT VFR BLD AUTO: 46.1 % (ref 34–46.6)
HDLC SERPL-MCNC: 61 MG/DL
HGB BLD-MCNC: 15.1 G/DL (ref 11.1–15.9)
IMM GRANULOCYTES # BLD AUTO: 0 X10E3/UL (ref 0–0.1)
IMM GRANULOCYTES NFR BLD AUTO: 0 %
LDLC SERPL CALC-MCNC: 60 MG/DL (ref 0–99)
LYMPHOCYTES # BLD AUTO: 1.9 X10E3/UL (ref 0.7–3.1)
LYMPHOCYTES NFR BLD AUTO: 34 %
MCH RBC QN AUTO: 30.6 PG (ref 26.6–33)
MCHC RBC AUTO-ENTMCNC: 32.8 G/DL (ref 31.5–35.7)
MCV RBC AUTO: 93 FL (ref 79–97)
MONOCYTES # BLD AUTO: 0.5 X10E3/UL (ref 0.1–0.9)
MONOCYTES NFR BLD AUTO: 9 %
NEUTROPHILS # BLD AUTO: 3 X10E3/UL (ref 1.4–7)
NEUTROPHILS NFR BLD AUTO: 51 %
PLATELET # BLD AUTO: 267 X10E3/UL (ref 150–450)
POTASSIUM SERPL-SCNC: 4.8 MMOL/L (ref 3.5–5.2)
PROT SERPL-MCNC: 6.8 G/DL (ref 6–8.5)
PROT UR-MCNC: 8.1 MG/DL
PROT/CREAT UR: 134 MG/G CREAT (ref 0–200)
RBC # BLD AUTO: 4.94 X10E6/UL (ref 3.77–5.28)
SODIUM SERPL-SCNC: 138 MMOL/L (ref 134–144)
TRIGL SERPL-MCNC: 62 MG/DL (ref 0–149)
TSH SERPL DL<=0.005 MIU/L-ACNC: 2.46 UIU/ML (ref 0.45–4.5)
VLDLC SERPL CALC-MCNC: 13 MG/DL (ref 5–40)
WBC # BLD AUTO: 5.7 X10E3/UL (ref 3.4–10.8)

## 2024-12-08 DIAGNOSIS — F33.41 RECURRENT MAJOR DEPRESSION IN PARTIAL REMISSION: ICD-10-CM

## 2024-12-08 DIAGNOSIS — F41.9 ANXIETY: ICD-10-CM

## 2024-12-09 RX ORDER — CITALOPRAM HYDROBROMIDE 20 MG/1
TABLET ORAL
Qty: 90 TABLET | Refills: 3 | Status: SHIPPED | OUTPATIENT
Start: 2024-12-09

## 2025-01-03 ENCOUNTER — ANTICOAGULATION VISIT (OUTPATIENT)
Dept: FAMILY MEDICINE CLINIC | Facility: CLINIC | Age: 80
End: 2025-01-03
Payer: MEDICARE

## 2025-01-03 DIAGNOSIS — Z79.01 LONG TERM CURRENT USE OF ANTICOAGULANT THERAPY: ICD-10-CM

## 2025-01-03 DIAGNOSIS — I48.21 PERMANENT ATRIAL FIBRILLATION: Primary | ICD-10-CM

## 2025-01-03 LAB — INR PPP: 2.7 (ref 0.9–1.1)

## 2025-01-03 PROCEDURE — 85610 PROTHROMBIN TIME: CPT | Performed by: STUDENT IN AN ORGANIZED HEALTH CARE EDUCATION/TRAINING PROGRAM

## 2025-01-03 PROCEDURE — 36416 COLLJ CAPILLARY BLOOD SPEC: CPT | Performed by: STUDENT IN AN ORGANIZED HEALTH CARE EDUCATION/TRAINING PROGRAM

## 2025-02-05 ENCOUNTER — ANTICOAGULATION VISIT (OUTPATIENT)
Dept: FAMILY MEDICINE CLINIC | Facility: CLINIC | Age: 80
End: 2025-02-05
Payer: MEDICARE

## 2025-02-05 ENCOUNTER — OFFICE VISIT (OUTPATIENT)
Dept: FAMILY MEDICINE CLINIC | Facility: CLINIC | Age: 80
End: 2025-02-05
Payer: MEDICARE

## 2025-02-05 VITALS
WEIGHT: 146 LBS | BODY MASS INDEX: 26.87 KG/M2 | HEIGHT: 62 IN | SYSTOLIC BLOOD PRESSURE: 124 MMHG | TEMPERATURE: 96.4 F | HEART RATE: 74 BPM | OXYGEN SATURATION: 100 % | DIASTOLIC BLOOD PRESSURE: 76 MMHG

## 2025-02-05 DIAGNOSIS — I49.3 PVCS (PREMATURE VENTRICULAR CONTRACTIONS): ICD-10-CM

## 2025-02-05 DIAGNOSIS — R41.3 MEMORY DEFICITS: ICD-10-CM

## 2025-02-05 DIAGNOSIS — I48.21 PERMANENT ATRIAL FIBRILLATION: Primary | ICD-10-CM

## 2025-02-05 DIAGNOSIS — Z00.00 MEDICARE ANNUAL WELLNESS VISIT, SUBSEQUENT: Primary | ICD-10-CM

## 2025-02-05 DIAGNOSIS — Z13.0 SCREENING FOR DEFICIENCY ANEMIA: ICD-10-CM

## 2025-02-05 DIAGNOSIS — E11.9 TYPE 2 DIABETES MELLITUS WITHOUT COMPLICATION, WITHOUT LONG-TERM CURRENT USE OF INSULIN: ICD-10-CM

## 2025-02-05 DIAGNOSIS — Z79.01 LONG TERM CURRENT USE OF ANTICOAGULANT THERAPY: ICD-10-CM

## 2025-02-05 DIAGNOSIS — Z13.6 SCREENING FOR ISCHEMIC HEART DISEASE: ICD-10-CM

## 2025-02-05 LAB — INR PPP: 3.8 (ref 0.9–1.1)

## 2025-02-05 PROCEDURE — 85610 PROTHROMBIN TIME: CPT | Performed by: STUDENT IN AN ORGANIZED HEALTH CARE EDUCATION/TRAINING PROGRAM

## 2025-02-05 PROCEDURE — 1170F FXNL STATUS ASSESSED: CPT | Performed by: STUDENT IN AN ORGANIZED HEALTH CARE EDUCATION/TRAINING PROGRAM

## 2025-02-05 PROCEDURE — 96160 PT-FOCUSED HLTH RISK ASSMT: CPT | Performed by: STUDENT IN AN ORGANIZED HEALTH CARE EDUCATION/TRAINING PROGRAM

## 2025-02-05 PROCEDURE — 3074F SYST BP LT 130 MM HG: CPT | Performed by: STUDENT IN AN ORGANIZED HEALTH CARE EDUCATION/TRAINING PROGRAM

## 2025-02-05 PROCEDURE — 3078F DIAST BP <80 MM HG: CPT | Performed by: STUDENT IN AN ORGANIZED HEALTH CARE EDUCATION/TRAINING PROGRAM

## 2025-02-05 PROCEDURE — G0439 PPPS, SUBSEQ VISIT: HCPCS | Performed by: STUDENT IN AN ORGANIZED HEALTH CARE EDUCATION/TRAINING PROGRAM

## 2025-02-05 PROCEDURE — 36416 COLLJ CAPILLARY BLOOD SPEC: CPT | Performed by: STUDENT IN AN ORGANIZED HEALTH CARE EDUCATION/TRAINING PROGRAM

## 2025-02-05 PROCEDURE — 1125F AMNT PAIN NOTED PAIN PRSNT: CPT | Performed by: STUDENT IN AN ORGANIZED HEALTH CARE EDUCATION/TRAINING PROGRAM

## 2025-02-05 PROCEDURE — 1160F RVW MEDS BY RX/DR IN RCRD: CPT | Performed by: STUDENT IN AN ORGANIZED HEALTH CARE EDUCATION/TRAINING PROGRAM

## 2025-02-05 PROCEDURE — 1159F MED LIST DOCD IN RCRD: CPT | Performed by: STUDENT IN AN ORGANIZED HEALTH CARE EDUCATION/TRAINING PROGRAM

## 2025-02-05 NOTE — PROGRESS NOTES
Subjective   The ABCs of the Annual Wellness Visit  Medicare Wellness Visit      Samira Workman is a 80 y.o. patient who presents for a Medicare Wellness Visit.    The following portions of the patient's history were reviewed and   updated as appropriate: allergies, current medications, past family history, past medical history, past social history, past surgical history, and problem list.    Compared to one year ago, the patient's physical   health is the same.  Compared to one year ago, the patient's mental   health is the same.    Recent Hospitalizations:  She was not admitted to the hospital during the last year.     Current Medical Providers:  Patient Care Team:  Adalgisa Hdz MD as PCP - General (Family Medicine)  Rishi Cortez MD as Consulting Physician (Pain Medicine)  Teja Sim MD as Cardiologist (Cardiology)    Outpatient Medications Prior to Visit   Medication Sig Dispense Refill    Apple Cider Vinegar 300 MG tablet Take  by mouth.      benazepril (LOTENSIN) 10 MG tablet TAKE 1 TABLET EVERY DAY 90 tablet 3    Biotin 1000 MCG chewable tablet Chew.      Cholecalciferol 25 MCG (1000 UT) tablet Take 1 tablet by mouth Daily.      citalopram (CeleXA) 20 MG tablet TAKE 1 TABLET EVERY DAY 90 tablet 3    folic acid (FOLVITE) 400 MCG tablet Take 2.5 tablets by mouth Daily.      glucosamine-chondroitin 500-400 MG capsule capsule Take  by mouth Daily.      melatonin 1 MG tablet Take 1 tablet by mouth.      methocarbamol (ROBAXIN) 750 MG tablet Take 1 tablet by mouth 4 (Four) Times a Day As Needed for Muscle Spasms. 30 tablet 2    Multiple Vitamin (MULTI VITAMIN DAILY PO) Take 1 tablet by mouth Daily.      pravastatin (PRAVACHOL) 40 MG tablet TAKE 1 TABLET EVERY DAY 90 tablet 3    traMADol ER (ULTRAM-ER) 200 MG 24 hr tablet Daily.      TURMERIC PO Take 1 tablet by mouth Daily.      warfarin (COUMADIN) 2.5 MG tablet TAKE 1 TABLET BY MOUTH 2 (TWO) TIMES A WEEK. 24 tablet 3    warfarin (COUMADIN) 5 MG tablet TAKE  "1 TAB (5MG) 5 TIMES WEEKLY. 60 tablet 3     No facility-administered medications prior to visit.     Opioid medication/s are on active medication list.  and I have evaluated her active treatment plan and pain score trends (see table).  Vitals:    02/05/25 0950   PainSc:   6   PainLoc: Foot     I have reviewed the chart for potential of high risk medication and harmful drug interactions in the elderly.        Aspirin is not on active medication list.  Aspirin use is contraindicated for this patient due to: current use of warfarin.  .    Patient Active Problem List   Diagnosis    Chronic low back pain without sciatica    Controlled type 2 diabetes mellitus with complication, without long-term current use of insulin    Herniated nucleus pulposus, L5-S1    Primary hypertension    Long term current use of anticoagulant therapy    Mixed hyperlipidemia    Osteopenia    Permanent atrial fibrillation    Vitamin D deficiency    Recurrent major depression in partial remission    Neuropathy    Insomnia    Anxiety    Nontraumatic tear of rotator cuff    Rupture of tibialis anterior tendon    Medicare annual wellness visit, subsequent    PVCs (premature ventricular contractions)     Advance Care Planning Advance Directive is on file.  ACP discussion was held with the patient during this visit. Patient has an advance directive in EMR which is still valid.             Objective   Vitals:    02/05/25 0950   BP: 124/76   Pulse: 74   Temp: 96.4 °F (35.8 °C)   SpO2: 100%   Weight: 66.2 kg (146 lb)   Height: 157.5 cm (62\")   PainSc:   6   PainLoc: Foot       Estimated body mass index is 26.7 kg/m² as calculated from the following:    Height as of this encounter: 157.5 cm (62\").    Weight as of this encounter: 66.2 kg (146 lb).             Does the patient have evidence of cognitive impairment? No  Lab Results   Component Value Date    CHLPL 134 11/20/2024    TRIG 62 11/20/2024    HDL 61 11/20/2024    LDL 60 11/20/2024    VLDL 13 " 2024    HGBA1C 6.5 (H) 2024                                                                                               Health  Risk Assessment    Smoking Status:  Social History     Tobacco Use   Smoking Status Never    Passive exposure: Never   Smokeless Tobacco Never     Alcohol Consumption:  Social History     Substance and Sexual Activity   Alcohol Use Yes    Comment: occasionally socially       Fall Risk Screen  STEADI Fall Risk Assessment was completed, and patient is at LOW risk for falls.Assessment completed on:2025    Depression Screening   Little interest or pleasure in doing things? Not at all   Feeling down, depressed, or hopeless? Not at all   PHQ-2 Total Score 0      Health Habits and Functional and Cognitive Screenin/5/2025     9:57 AM   Functional & Cognitive Status   Do you have difficulty preparing food and eating? No   Do you have difficulty bathing yourself, getting dressed or grooming yourself? No   Do you have difficulty using the toilet? No   Do you have difficulty moving around from place to place? No   Do you have trouble with steps or getting out of a bed or a chair? No   Current Diet Unhealthy Diet   Dental Exam Up to date   Eye Exam Up to date   Exercise (times per week) 2 times per week   Current Exercises Include Walking   Do you need help using the phone?  No   Are you deaf or do you have serious difficulty hearing?  No   Do you need help to go to places out of walking distance? No   Do you need help shopping? No   Do you need help preparing meals?  No   Do you need help with housework?  No   Do you need help with laundry? No   Do you need help taking your medications? No   Do you need help managing money? No   Do you ever drive or ride in a car without wearing a seat belt? No   Have you felt unusual stress, anger or loneliness in the last month? No   Who do you live with? Child   If you need help, do you have trouble finding someone available to you? Yes    Have you been bothered in the last four weeks by sexual problems? No   Do you have difficulty concentrating, remembering or making decisions? Yes           Age-appropriate Screening Schedule:  Refer to the list below for future screening recommendations based on patient's age, sex and/or medical conditions. Orders for these recommended tests are listed in the plan section. The patient has been provided with a written plan.    Health Maintenance List  Health Maintenance   Topic Date Due    Pneumococcal Vaccine 65+ (1 of 2 - PCV) 01/14/1964    ZOSTER VACCINE (1 of 2) Never done    DIABETIC EYE EXAM  12/06/2019    RSV Vaccine - Adults (1 - 1-dose 75+ series) Never done    DXA SCAN  10/24/2020    DIABETIC FOOT EXAM  05/26/2021    TDAP/TD VACCINES (2 - Tdap) 04/29/2023    COVID-19 Vaccine (7 - 2024-25 season) 09/01/2024    ANNUAL WELLNESS VISIT  01/23/2025    BMI FOLLOWUP  01/23/2025    HEMOGLOBIN A1C  05/20/2025    LIPID PANEL  11/20/2025    INFLUENZA VACCINE  Completed    URINE MICROALBUMIN  Discontinued    COLORECTAL CANCER SCREENING  Discontinued                                                                                                                                                CMS Preventative Services Quick Reference  Risk Factors Identified During Encounter  Depression/Dysphoria: Current medication is effective, no change recommended  Immunizations Discussed/Encouraged: Influenza, Prevnar 20 (Pneumococcal 20-valent conjugate), Shingrix, COVID19, and RSV (Respiratory Syncytial Virus)  Dental Screening Recommended  Vision Screening Recommended    The above risks/problems have been discussed with the patient.  Pertinent information has been shared with the patient in the After Visit Summary.  An After Visit Summary and PPPS were made available to the patient.    Follow Up:   Next Medicare Wellness visit to be scheduled in 1 year.     Assessment & Plan  Medicare annual wellness visit, subsequent  Patient  was counseled in regards to maintaining a healthy lifestyle, rich in whole grains, fruits and vegetables. Limit high saturated fats and processed sugars. Maintain an active lifestyle to promote overall health and well being.            Screening for deficiency anemia    Orders:    CBC Auto Differential    Vitamin B12    Folate    Screening for ischemic heart disease    Orders:    Comprehensive Metabolic Panel    Lipid Panel    Type 2 diabetes mellitus without complication, without long-term current use of insulin      Orders:    ORDER: Hemoglobin A1c    Microalbumin / Creatinine Urine Ratio - Urine, Clean Catch    Long term current use of anticoagulant therapy    Orders:    Protime-INR    Memory deficits    Orders:    TSH    Vitamin B12    80-year-old female with atrial fibrillation on warfarin, type 2 diabetes, depression who presents for annual wellness exam today.    Type 2 diabetes is well-controlled with diet.  She is on ACE inhibitor and statin.  Does have some neuropathy but it is hard to say if this is more related to diabetes or lumbar degenerative disease.    Paroxysmal A-fib is well-controlled.  Heart rate normal.  Not requiring beta-blockade or rhythm control.  She is on warfarin for CVA prevention.  INR today is elevated at 3.8.  I recommended she hold her dose of warfarin tonight.  I have ordered an INR with blood work to ensure accuracy.  Will determine further management per blood work.    She does notice some mild memory deficits, no red flag symptoms.  Not currently taking B12 supplement.  Labs as above.  Mood is worse in the winter.  She is continuing to take her Celexa 20 mg.  She also is doing light therapy.    Follow Up:   No follow-ups on file.

## 2025-02-06 LAB
ALBUMIN SERPL-MCNC: 4.5 G/DL (ref 3.8–4.8)
ALBUMIN/CREAT UR: 10 MG/G CREAT (ref 0–29)
ALP SERPL-CCNC: 103 IU/L (ref 44–121)
ALT SERPL-CCNC: 19 IU/L (ref 0–32)
AST SERPL-CCNC: 23 IU/L (ref 0–40)
BASOPHILS # BLD AUTO: 0.1 X10E3/UL (ref 0–0.2)
BASOPHILS NFR BLD AUTO: 1 %
BILIRUB SERPL-MCNC: 0.4 MG/DL (ref 0–1.2)
BUN SERPL-MCNC: 14 MG/DL (ref 8–27)
BUN/CREAT SERPL: 22 (ref 12–28)
CALCIUM SERPL-MCNC: 9.6 MG/DL (ref 8.7–10.3)
CHLORIDE SERPL-SCNC: 102 MMOL/L (ref 96–106)
CHOLEST SERPL-MCNC: 174 MG/DL (ref 100–199)
CO2 SERPL-SCNC: 25 MMOL/L (ref 20–29)
CREAT SERPL-MCNC: 0.63 MG/DL (ref 0.57–1)
CREAT UR-MCNC: 152.2 MG/DL
EGFRCR SERPLBLD CKD-EPI 2021: 90 ML/MIN/1.73
EOSINOPHIL # BLD AUTO: 0.3 X10E3/UL (ref 0–0.4)
EOSINOPHIL NFR BLD AUTO: 4 %
ERYTHROCYTE [DISTWIDTH] IN BLOOD BY AUTOMATED COUNT: 12.5 % (ref 11.7–15.4)
FOLATE SERPL-MCNC: >20 NG/ML
GLOBULIN SER CALC-MCNC: 2.7 G/DL (ref 1.5–4.5)
GLUCOSE SERPL-MCNC: 125 MG/DL (ref 70–99)
HBA1C MFR BLD: 6.6 % (ref 4.8–5.6)
HCT VFR BLD AUTO: 45.5 % (ref 34–46.6)
HDLC SERPL-MCNC: 63 MG/DL
HGB BLD-MCNC: 14.9 G/DL (ref 11.1–15.9)
IMM GRANULOCYTES # BLD AUTO: 0 X10E3/UL (ref 0–0.1)
IMM GRANULOCYTES NFR BLD AUTO: 0 %
INR PPP: 3.5 (ref 0.9–1.2)
LDLC SERPL CALC-MCNC: 93 MG/DL (ref 0–99)
LYMPHOCYTES # BLD AUTO: 2 X10E3/UL (ref 0.7–3.1)
LYMPHOCYTES NFR BLD AUTO: 29 %
MCH RBC QN AUTO: 30.5 PG (ref 26.6–33)
MCHC RBC AUTO-ENTMCNC: 32.7 G/DL (ref 31.5–35.7)
MCV RBC AUTO: 93 FL (ref 79–97)
MICROALBUMIN UR-MCNC: 14.6 UG/ML
MONOCYTES # BLD AUTO: 0.5 X10E3/UL (ref 0.1–0.9)
MONOCYTES NFR BLD AUTO: 7 %
NEUTROPHILS # BLD AUTO: 4.1 X10E3/UL (ref 1.4–7)
NEUTROPHILS NFR BLD AUTO: 59 %
PLATELET # BLD AUTO: 270 X10E3/UL (ref 150–450)
POTASSIUM SERPL-SCNC: 4.7 MMOL/L (ref 3.5–5.2)
PROT SERPL-MCNC: 7.2 G/DL (ref 6–8.5)
PROTHROMBIN TIME: 35.2 SEC (ref 9.1–12)
RBC # BLD AUTO: 4.88 X10E6/UL (ref 3.77–5.28)
SODIUM SERPL-SCNC: 140 MMOL/L (ref 134–144)
TRIGL SERPL-MCNC: 97 MG/DL (ref 0–149)
TSH SERPL DL<=0.005 MIU/L-ACNC: 2.99 UIU/ML (ref 0.45–4.5)
VIT B12 SERPL-MCNC: 1086 PG/ML (ref 232–1245)
VLDLC SERPL CALC-MCNC: 18 MG/DL (ref 5–40)
WBC # BLD AUTO: 6.9 X10E3/UL (ref 3.4–10.8)

## 2025-03-06 ENCOUNTER — ANTICOAGULATION VISIT (OUTPATIENT)
Dept: FAMILY MEDICINE CLINIC | Facility: CLINIC | Age: 80
End: 2025-03-06
Payer: MEDICARE

## 2025-03-06 DIAGNOSIS — Z79.01 LONG TERM CURRENT USE OF ANTICOAGULANT THERAPY: Primary | ICD-10-CM

## 2025-03-06 LAB — INR PPP: 2.7 (ref 0.9–1.1)

## 2025-04-03 ENCOUNTER — ANTICOAGULATION VISIT (OUTPATIENT)
Dept: FAMILY MEDICINE CLINIC | Facility: CLINIC | Age: 80
End: 2025-04-03
Payer: MEDICARE

## 2025-04-03 DIAGNOSIS — Z79.01 LONG TERM CURRENT USE OF ANTICOAGULANT THERAPY: Primary | ICD-10-CM

## 2025-04-03 LAB — INR PPP: 2.4 (ref 0.9–1.1)

## 2025-04-26 DIAGNOSIS — I48.0 PAROXYSMAL ATRIAL FIBRILLATION: ICD-10-CM

## 2025-04-28 RX ORDER — WARFARIN SODIUM 5 MG/1
TABLET ORAL
Qty: 60 TABLET | Refills: 3 | Status: SHIPPED | OUTPATIENT
Start: 2025-04-28

## 2025-05-05 ENCOUNTER — ANTICOAGULATION VISIT (OUTPATIENT)
Dept: FAMILY MEDICINE CLINIC | Facility: CLINIC | Age: 80
End: 2025-05-05
Payer: MEDICARE

## 2025-05-05 DIAGNOSIS — I48.0 PAROXYSMAL ATRIAL FIBRILLATION: Primary | ICD-10-CM

## 2025-05-05 LAB — INR PPP: 2.6 (ref 0.9–1.1)

## 2025-06-11 DIAGNOSIS — I48.0 PAROXYSMAL ATRIAL FIBRILLATION: ICD-10-CM

## 2025-06-11 RX ORDER — WARFARIN SODIUM 2.5 MG/1
2.5 TABLET ORAL 2 TIMES WEEKLY
Qty: 24 TABLET | Refills: 3 | Status: SHIPPED | OUTPATIENT
Start: 2025-06-12

## 2025-06-11 NOTE — TELEPHONE ENCOUNTER
Rx Refill Note  Requested Prescriptions     Pending Prescriptions Disp Refills    warfarin (COUMADIN) 2.5 MG tablet [Pharmacy Med Name: Warfarin Sodium Oral Tablet 2.5 MG] 24 tablet 3     Sig: TAKE 1 TABLET TWICE WEEKLY      Last office visit with prescribing clinician: 2/5/2025   Last telemedicine visit with prescribing clinician: Visit date not found   Next office visit with prescribing clinician: Visit date not found                         Would you like a call back once the refill request has been completed: [] Yes [] No    If the office needs to give you a call back, can they leave a voicemail: [] Yes [] No    Tiffany Garcia MA  06/11/25, 07:19 EDT

## 2025-07-07 RX ORDER — BENAZEPRIL HYDROCHLORIDE 10 MG/1
10 TABLET ORAL DAILY
Qty: 90 TABLET | Refills: 3 | Status: SHIPPED | OUTPATIENT
Start: 2025-07-07

## 2025-07-07 RX ORDER — PRAVASTATIN SODIUM 40 MG
40 TABLET ORAL DAILY
Qty: 90 TABLET | Refills: 3 | Status: SHIPPED | OUTPATIENT
Start: 2025-07-07

## 2025-07-15 ENCOUNTER — ANTICOAGULATION VISIT (OUTPATIENT)
Dept: FAMILY MEDICINE CLINIC | Facility: CLINIC | Age: 80
End: 2025-07-15
Payer: MEDICARE

## 2025-07-15 DIAGNOSIS — I48.21 PERMANENT ATRIAL FIBRILLATION: Primary | ICD-10-CM

## 2025-07-15 LAB — INR PPP: 2.7 (ref 0.9–1.1)

## 2025-07-15 PROCEDURE — 85610 PROTHROMBIN TIME: CPT | Performed by: STUDENT IN AN ORGANIZED HEALTH CARE EDUCATION/TRAINING PROGRAM

## 2025-07-15 PROCEDURE — 36416 COLLJ CAPILLARY BLOOD SPEC: CPT | Performed by: STUDENT IN AN ORGANIZED HEALTH CARE EDUCATION/TRAINING PROGRAM

## 2025-08-29 ENCOUNTER — ANTICOAGULATION VISIT (OUTPATIENT)
Dept: FAMILY MEDICINE CLINIC | Facility: CLINIC | Age: 80
End: 2025-08-29
Payer: MEDICARE

## 2025-08-29 DIAGNOSIS — I48.21 PERMANENT ATRIAL FIBRILLATION: Primary | ICD-10-CM

## 2025-08-29 DIAGNOSIS — Z79.01 LONG TERM CURRENT USE OF ANTICOAGULANT THERAPY: ICD-10-CM

## 2025-08-29 LAB — INR PPP: 3 (ref 0.9–1.1)

## (undated) DEVICE — SUT ETHLN 4/0 PS2 PLSTC 1667G

## (undated) DEVICE — BLD SHAVER BONECUTTER 4MM 13CM

## (undated) DEVICE — SOL ISO/ALC RUB 70PCT 4OZ

## (undated) DEVICE — TP NDL SCORPION MULTIFIRE

## (undated) DEVICE — DRSNG WND GZ CURAD OIL EMULSION 3X3IN STRL

## (undated) DEVICE — TUBING SET, GRAVITY, 4-SPIKE

## (undated) DEVICE — SKIN PREP TRAY W/CHG: Brand: MEDLINE INDUSTRIES, INC.

## (undated) DEVICE — DRAPE,U/ SHT,SPLIT,PLAS,STERIL: Brand: MEDLINE

## (undated) DEVICE — ABL ASP APOLLO RF XL 90D

## (undated) DEVICE — GLV SURG BIOGEL LTX PF 8 1/2

## (undated) DEVICE — APPL CHLORAPREP W/TINT 26ML ORNG

## (undated) DEVICE — CANN TRPL DAM 7X7MM NO VLV

## (undated) DEVICE — GLV SURG TRIUMPH CLASSIC PF LTX 8.5 STRL

## (undated) DEVICE — GLV SURG TRIUMPH CLASSIC PF LTX 7 STRL

## (undated) DEVICE — GLV SURG BIOGEL LTX PF 6 1/2

## (undated) DEVICE — POSTN ARMSLV LAT/TRACTION DISP

## (undated) DEVICE — PK ARTHSCP SHLDR TOWER 40

## (undated) DEVICE — GOWN,NON-REINFORCED,SIRUS,SET IN SLV,XXL: Brand: MEDLINE